# Patient Record
Sex: MALE | Race: BLACK OR AFRICAN AMERICAN | Employment: UNEMPLOYED | URBAN - METROPOLITAN AREA
[De-identification: names, ages, dates, MRNs, and addresses within clinical notes are randomized per-mention and may not be internally consistent; named-entity substitution may affect disease eponyms.]

---

## 2017-01-24 ENCOUNTER — APPOINTMENT (OUTPATIENT)
Dept: CT IMAGING | Age: 62
End: 2017-01-24
Attending: EMERGENCY MEDICINE
Payer: COMMERCIAL

## 2017-01-24 ENCOUNTER — HOSPITAL ENCOUNTER (EMERGENCY)
Age: 62
Discharge: HOME | End: 2017-01-25
Attending: EMERGENCY MEDICINE
Payer: COMMERCIAL

## 2017-01-24 ENCOUNTER — APPOINTMENT (OUTPATIENT)
Dept: GENERAL RADIOLOGY | Age: 62
End: 2017-01-24
Attending: EMERGENCY MEDICINE
Payer: COMMERCIAL

## 2017-01-24 DIAGNOSIS — J04.0 LARYNGITIS: ICD-10-CM

## 2017-01-24 DIAGNOSIS — E87.20 LACTIC ACIDOSIS: ICD-10-CM

## 2017-01-24 DIAGNOSIS — J20.9 ACUTE BRONCHITIS, UNSPECIFIED ORGANISM: Primary | ICD-10-CM

## 2017-01-24 DIAGNOSIS — E86.0 DEHYDRATION: ICD-10-CM

## 2017-01-24 DIAGNOSIS — F10.920 ALCOHOL INTOXICATION, UNCOMPLICATED (HCC): ICD-10-CM

## 2017-01-24 LAB
ALBUMIN SERPL BCP-MCNC: 3.9 G/DL (ref 3.5–5)
ALBUMIN/GLOB SERPL: 0.9 {RATIO} (ref 1.1–2.2)
ALP SERPL-CCNC: 63 U/L (ref 45–117)
ALT SERPL-CCNC: 25 U/L (ref 12–78)
ANION GAP BLD CALC-SCNC: 15 MMOL/L (ref 5–15)
APTT PPP: 30.3 SEC (ref 22.1–32.5)
AST SERPL W P-5'-P-CCNC: 36 U/L (ref 15–37)
BASOPHILS # BLD AUTO: 0 K/UL (ref 0–0.1)
BASOPHILS # BLD: 1 % (ref 0–1)
BILIRUB SERPL-MCNC: 1.5 MG/DL (ref 0.2–1)
BUN SERPL-MCNC: 10 MG/DL (ref 6–20)
BUN/CREAT SERPL: 13 (ref 12–20)
CALCIUM SERPL-MCNC: 8.2 MG/DL (ref 8.5–10.1)
CHLORIDE SERPL-SCNC: 103 MMOL/L (ref 97–108)
CK MB CFR SERPL CALC: 1.1 % (ref 0–2.5)
CK MB SERPL-MCNC: 3.7 NG/ML (ref 5–25)
CK SERPL-CCNC: 322 U/L (ref 39–308)
CO2 SERPL-SCNC: 24 MMOL/L (ref 21–32)
CREAT SERPL-MCNC: 0.76 MG/DL (ref 0.7–1.3)
DEPRECATED S PYO AG THROAT QL EIA: NEGATIVE
EOSINOPHIL # BLD: 0 K/UL (ref 0–0.4)
EOSINOPHIL NFR BLD: 1 % (ref 0–7)
ERYTHROCYTE [DISTWIDTH] IN BLOOD BY AUTOMATED COUNT: 12.1 % (ref 11.5–14.5)
ETHANOL SERPL-MCNC: 265 MG/DL
FLUAV AG NPH QL IA: NEGATIVE
FLUBV AG NOSE QL IA: NEGATIVE
GLOBULIN SER CALC-MCNC: 4.3 G/DL (ref 2–4)
GLUCOSE SERPL-MCNC: 84 MG/DL (ref 65–100)
HCT VFR BLD AUTO: 41.5 % (ref 36.6–50.3)
HGB BLD-MCNC: 13.6 G/DL (ref 12.1–17)
INR PPP: 1 (ref 0.9–1.1)
LACTATE SERPL-SCNC: 3.1 MMOL/L (ref 0.4–2)
LIPASE SERPL-CCNC: 172 U/L (ref 73–393)
LYMPHOCYTES # BLD AUTO: 39 % (ref 12–49)
LYMPHOCYTES # BLD: 1.5 K/UL (ref 0.8–3.5)
MAGNESIUM SERPL-MCNC: 2.2 MG/DL (ref 1.6–2.4)
MCH RBC QN AUTO: 32.7 PG (ref 26–34)
MCHC RBC AUTO-ENTMCNC: 32.8 G/DL (ref 30–36.5)
MCV RBC AUTO: 99.8 FL (ref 80–99)
MONOCYTES # BLD: 0.4 K/UL (ref 0–1)
MONOCYTES NFR BLD AUTO: 10 % (ref 5–13)
NEUTS SEG # BLD: 1.9 K/UL (ref 1.8–8)
NEUTS SEG NFR BLD AUTO: 49 % (ref 32–75)
PLATELET # BLD AUTO: 175 K/UL (ref 150–400)
POTASSIUM SERPL-SCNC: 3.8 MMOL/L (ref 3.5–5.1)
PROT SERPL-MCNC: 8.2 G/DL (ref 6.4–8.2)
PROTHROMBIN TIME: 9.9 SEC (ref 9–11.1)
RBC # BLD AUTO: 4.16 M/UL (ref 4.1–5.7)
SODIUM SERPL-SCNC: 142 MMOL/L (ref 136–145)
THERAPEUTIC RANGE,PTTT: NORMAL SECS (ref 58–77)
TROPONIN I SERPL-MCNC: <0.04 NG/ML
WBC # BLD AUTO: 3.8 K/UL (ref 4.1–11.1)

## 2017-01-24 PROCEDURE — 36415 COLL VENOUS BLD VENIPUNCTURE: CPT | Performed by: EMERGENCY MEDICINE

## 2017-01-24 PROCEDURE — 96374 THER/PROPH/DIAG INJ IV PUSH: CPT

## 2017-01-24 PROCEDURE — 99284 EMERGENCY DEPT VISIT MOD MDM: CPT

## 2017-01-24 PROCEDURE — 96361 HYDRATE IV INFUSION ADD-ON: CPT

## 2017-01-24 PROCEDURE — 87880 STREP A ASSAY W/OPTIC: CPT | Performed by: EMERGENCY MEDICINE

## 2017-01-24 PROCEDURE — 80053 COMPREHEN METABOLIC PANEL: CPT | Performed by: EMERGENCY MEDICINE

## 2017-01-24 PROCEDURE — 83690 ASSAY OF LIPASE: CPT | Performed by: EMERGENCY MEDICINE

## 2017-01-24 PROCEDURE — 84484 ASSAY OF TROPONIN QUANT: CPT | Performed by: EMERGENCY MEDICINE

## 2017-01-24 PROCEDURE — 85025 COMPLETE CBC W/AUTO DIFF WBC: CPT | Performed by: EMERGENCY MEDICINE

## 2017-01-24 PROCEDURE — 85730 THROMBOPLASTIN TIME PARTIAL: CPT | Performed by: EMERGENCY MEDICINE

## 2017-01-24 PROCEDURE — 85610 PROTHROMBIN TIME: CPT | Performed by: EMERGENCY MEDICINE

## 2017-01-24 PROCEDURE — 74011000250 HC RX REV CODE- 250: Performed by: EMERGENCY MEDICINE

## 2017-01-24 PROCEDURE — 83605 ASSAY OF LACTIC ACID: CPT | Performed by: EMERGENCY MEDICINE

## 2017-01-24 PROCEDURE — 87070 CULTURE OTHR SPECIMN AEROBIC: CPT | Performed by: EMERGENCY MEDICINE

## 2017-01-24 PROCEDURE — 87804 INFLUENZA ASSAY W/OPTIC: CPT | Performed by: EMERGENCY MEDICINE

## 2017-01-24 PROCEDURE — 70450 CT HEAD/BRAIN W/O DYE: CPT

## 2017-01-24 PROCEDURE — 71020 XR CHEST PA LAT: CPT

## 2017-01-24 PROCEDURE — 74011250636 HC RX REV CODE- 250/636: Performed by: EMERGENCY MEDICINE

## 2017-01-24 PROCEDURE — 87040 BLOOD CULTURE FOR BACTERIA: CPT | Performed by: EMERGENCY MEDICINE

## 2017-01-24 PROCEDURE — 83735 ASSAY OF MAGNESIUM: CPT | Performed by: EMERGENCY MEDICINE

## 2017-01-24 PROCEDURE — 80307 DRUG TEST PRSMV CHEM ANLYZR: CPT | Performed by: EMERGENCY MEDICINE

## 2017-01-24 PROCEDURE — 96375 TX/PRO/DX INJ NEW DRUG ADDON: CPT

## 2017-01-24 PROCEDURE — 82550 ASSAY OF CK (CPK): CPT | Performed by: EMERGENCY MEDICINE

## 2017-01-24 RX ORDER — MORPHINE SULFATE 2 MG/ML
4 INJECTION, SOLUTION INTRAMUSCULAR; INTRAVENOUS
Status: COMPLETED | OUTPATIENT
Start: 2017-01-24 | End: 2017-01-24

## 2017-01-24 RX ORDER — LIDOCAINE HYDROCHLORIDE 20 MG/ML
10 SOLUTION OROPHARYNGEAL
Status: COMPLETED | OUTPATIENT
Start: 2017-01-24 | End: 2017-01-24

## 2017-01-24 RX ORDER — ONDANSETRON 2 MG/ML
4 INJECTION INTRAMUSCULAR; INTRAVENOUS
Status: COMPLETED | OUTPATIENT
Start: 2017-01-24 | End: 2017-01-24

## 2017-01-24 RX ADMIN — SODIUM CHLORIDE 1000 ML: 900 INJECTION, SOLUTION INTRAVENOUS at 22:13

## 2017-01-24 RX ADMIN — ONDANSETRON 4 MG: 2 INJECTION INTRAMUSCULAR; INTRAVENOUS at 21:34

## 2017-01-24 RX ADMIN — LIDOCAINE HYDROCHLORIDE 10 ML: 20 SOLUTION ORAL; TOPICAL at 21:34

## 2017-01-24 RX ADMIN — SODIUM CHLORIDE 1000 ML: 900 INJECTION, SOLUTION INTRAVENOUS at 21:35

## 2017-01-24 RX ADMIN — Medication 4 MG: at 21:34

## 2017-01-24 NOTE — LETTER
NOTIFICATION OF RETURN TO WORK / SCHOOL 
 
1/25/2017 Mr. Rajeev Fischer [facility] 3 
[facility] [phone] To Whom It May Concern: 
 
Rajeev Fischer was seen in the ER on 1/24/2017. He will return to work on 1/30/2017 with no restrictions. Per Dr. Bryanna Soto Sincerely,

## 2017-01-24 NOTE — LETTER
[address] 
[facility] EMERGENCY DEPT 
[address] [place] [address] [address] [id number] 
[phone] Work/School Note Date: 1/24/2017 To Whom It May concern: 
 
Parth Mancia was seen and treated today in the emergency room by the following provider(s): 
Attending Provider: Namita Gar MD. Parth Mancia may return to work on 01/27/2017. Sincerely, Namita Gar MD

## 2017-01-25 VITALS
DIASTOLIC BLOOD PRESSURE: 98 MMHG | BODY MASS INDEX: 25.86 KG/M2 | OXYGEN SATURATION: 95 % | TEMPERATURE: 98.3 F | RESPIRATION RATE: 18 BRPM | WEIGHT: 174.6 LBS | SYSTOLIC BLOOD PRESSURE: 162 MMHG | HEART RATE: 78 BPM | HEIGHT: 69 IN

## 2017-01-25 LAB
APPEARANCE UR: ABNORMAL
BACTERIA URNS QL MICRO: NEGATIVE /HPF
BILIRUB UR QL CFM: NEGATIVE
COLOR UR: ABNORMAL
EPITH CASTS URNS QL MICRO: ABNORMAL /LPF
GLUCOSE UR STRIP.AUTO-MCNC: NEGATIVE MG/DL
HGB UR QL STRIP: ABNORMAL
HYALINE CASTS URNS QL MICRO: ABNORMAL /LPF (ref 0–5)
KETONES UR QL STRIP.AUTO: ABNORMAL MG/DL
LACTATE SERPL-SCNC: 2.2 MMOL/L (ref 0.4–2)
LEUKOCYTE ESTERASE UR QL STRIP.AUTO: NEGATIVE
MUCOUS THREADS URNS QL MICRO: ABNORMAL /LPF
NITRITE UR QL STRIP.AUTO: NEGATIVE
PH UR STRIP: 5.5 [PH] (ref 5–8)
PROT UR STRIP-MCNC: 30 MG/DL
RBC #/AREA URNS HPF: ABNORMAL /HPF (ref 0–5)
SP GR UR REFRACTOMETRY: 1.02 (ref 1–1.03)
UROBILINOGEN UR QL STRIP.AUTO: 1 EU/DL (ref 0.2–1)
WBC URNS QL MICRO: ABNORMAL /HPF (ref 0–4)

## 2017-01-25 PROCEDURE — 81001 URINALYSIS AUTO W/SCOPE: CPT | Performed by: EMERGENCY MEDICINE

## 2017-01-25 PROCEDURE — 83605 ASSAY OF LACTIC ACID: CPT | Performed by: EMERGENCY MEDICINE

## 2017-01-25 RX ORDER — AZITHROMYCIN 250 MG/1
TABLET, FILM COATED ORAL
Qty: 6 TAB | Refills: 0 | Status: SHIPPED | OUTPATIENT
Start: 2017-01-24 | End: 2017-01-29

## 2017-01-25 RX ORDER — LIDOCAINE HYDROCHLORIDE 20 MG/ML
15 SOLUTION OROPHARYNGEAL
Qty: 100 ML | Refills: 0 | Status: SHIPPED | OUTPATIENT
Start: 2017-01-24 | End: 2017-07-16

## 2017-01-25 RX ORDER — GUAIFENESIN 600 MG/1
600 TABLET, EXTENDED RELEASE ORAL
Qty: 10 TAB | Refills: 0 | Status: SHIPPED | OUTPATIENT
Start: 2017-01-24 | End: 2017-07-16

## 2017-01-25 NOTE — ED NOTES
Patient in room/bed as applicable, no acute distress noted. Siderails x2 call bell in reach bed in low position. Denies needs or questions at this time.

## 2017-01-25 NOTE — ED PROVIDER NOTES
HPI Comments: Marge Pickard is a 64 y.o. 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 Patient also complains of weakness in his bilateral legs for the past week and right arm starting last night. Patient reports his diarrhea has resolved, noting his last episode was a few days ago. Patient notes a family history of hypertension. Patient reports use of alcohol, but denies use of any tobacco products or illicit drugs. Patient denies taking any medications for his symptoms today. Patient denies getting a flu shot this season. Patient denies any facial droop, numbness or tingling, urinary symptoms, abdominal pain, neck pain, back pain, or any other symptoms at this time. PCP: Mady Lane MD    There are no other complaints, changes or physical findings at this time. The history is provided by the patient. Past Medical History:   Diagnosis Date    Alcoholism ([facility] [address]     Hypertension     Ill-defined condition      Hernia     Seizures ([facility] [address]      Withdrawal        No past surgical history on file. No family history on file. Social History     Social History    Marital status:      Spouse name: N/A    Number of children: N/A    Years of education: N/A     Occupational History    Not on file.      Social History Main Topics    Smoking status: Never Smoker    Smokeless tobacco: Not on file    Alcohol use 5.4 oz/week     9 Cans of beer per week      Comment: 1/2 pint of liquor daily : 9/23 now denies     Drug use: No    Sexual activity: Not on file     Other Topics Concern    Not on file     Social History Narrative     ALLERGIES: Lisinopril    Review of Systems   Constitutional: Positive for chills and fever (subjective). HENT: Positive for sore throat. Eyes: Negative. Respiratory: Positive for cough (nonproductive) and shortness of breath (mild). Cardiovascular: Positive for chest pain (minimal). Gastrointestinal: Positive for diarrhea (resolved). Negative for abdominal pain. Endocrine: Negative. Negative for heat intolerance. Genitourinary: Negative. Negative for difficulty urinating, frequency, hematuria and urgency. Musculoskeletal: Positive for myalgias (generalized). Negative for back pain and neck pain. Skin: Negative. Allergic/Immunologic: Negative. Negative for immunocompromised state. Neurological: Positive for weakness (bilateral legs, right arm) and headaches (intermittent). Negative for facial asymmetry and numbness. Hematological: Negative. Does not bruise/bleed easily. Psychiatric/Behavioral: Negative. All other systems reviewed and are negative. Patient Vitals for the past 12 hrs:   Temp Pulse Resp BP SpO2   01/25/17 0242 98.3 °F (36.8 °C) - - - 98 %   01/25/17 0040 - - - - 95 %   01/24/17 2300 98.3 °F (36.8 °C) 78 18 (!) 148/91 98 %   01/24/17 2201 - - - 132/90 93 %   01/24/17 1854 98.7 °F (37.1 °C) (!) 124 20 (!) 152/105 95 %      Physical Exam   Constitutional: He is oriented to person, place, and time. He appears well-developed and well-nourished. He appears distressed (moderate). HENT:   Head: Normocephalic and atraumatic. Eyes: EOM are normal.   Neck: Normal range of motion. Mild cervical tenderness   Cardiovascular: Regular rhythm and normal heart sounds. Tachycardia present. Pulmonary/Chest: Effort normal and breath sounds normal. He exhibits no tenderness. Bilateral lower lobe rhonchi   Abdominal: Soft. Bowel sounds are normal. There is no tenderness. Musculoskeletal: He exhibits no edema. Neurological: He is alert and oriented to person, place, and time.  Coordination normal. Equal   Sensory intact  Strength 5.5 in left leg, 5-/5 in right leg   Skin: Skin is warm and dry. Psychiatric: He has a normal mood and affect. Nursing note and vitals reviewed. MDM  Number of Diagnoses or Management Options  Acute bronchitis, unspecified organism:   Alcohol intoxication, uncomplicated ([address]):   Dehydration:   Laryngitis:   Diagnosis management comments: DDx: Influenza, dehydration, pneumonia, UTI, pharyngitis, electrolyte abnormality, TIA       Amount and/or Complexity of Data Reviewed  Clinical lab tests: ordered and reviewed  Tests in the radiology section of CPT®: ordered and reviewed  Review and summarize past medical records: yes    Critical Care  Total time providing critical care: 30-74 minutes    Patient Progress  Patient progress: stable      Procedures     Progress Note:  10:18 PM  Patient states his throat is feeling better. Progress Note:  11:27 PM  Spoke to the lab concerning apparent urine culture in process but lack of urinalysis results. Per lab personnel, no urine sample was received. Will check with nursing staff to make sure sample is sent to lab. Sign-Out Note:  11:51 PM  Patient's presentation, labs/imaging and plan of care was reviewed with Esther Kenyon MD as part of sign out. They will follow up with urinalysis and repeat lactate as part of the plan discussed with the patient. Esther Doe MD's assistance in completion of this plan is greatly appreciated but it should be noted that I will be the provider of record for this patient. This note is prepared by Kristi Olivera, acting as Scribe for Rashi Montelongo MD.    Rashi Montelongo MD: The scribe's documentation has been prepared under my direction and personally reviewed by me in its entirety. I confirm that the note above accurately reflects all work, treatment, procedures, and medical decision making performed by me. Progress Note:  3:26 AM  Lactic improved.  Will discharge per  Ronnie's plan. Written by Cindy Antoine, ISABEL Nicoleibjerod, as dictated by Esther Min MD.     CRITICAL CARE NOTE :    2:42 PM      IMPENDING DETERIORATION -Cardiovascular and Metabolic    ASSOCIATED RISK FACTORS - Dysrhythmia, Metabolic changes and Dehydration    MANAGEMENT- Bedside Assessment and Supervision of Care    INTERPRETATION -  Xrays, CT Scan, ECG and Blood Pressure    INTERVENTIONS - hemodynamic mngmt    CASE REVIEW - Nursing and Family    TREATMENT RESPONSE -Improved    PERFORMED BY - Self        NOTES   :      I have spent 40 minutes of critical care time involved in lab review, consultations with specialist, family decision- making, bedside attention and documentation. During this entire length of time I was immediately available to the patient . Tom Torres MD                                                    LABORATORY TESTS:  Recent Results (from the past 12 hour(s))   INFLUENZA A & B AG (RAPID TEST)    Collection Time: 01/24/17  8:15 PM   Result Value Ref Range    Influenza A Antigen NEGATIVE  NEG      Influenza B Antigen NEGATIVE  NEG     CBC WITH AUTOMATED DIFF    Collection Time: 01/24/17  8:37 PM   Result Value Ref Range    WBC 3.8 (L) 4.1 - 11.1 K/uL    RBC 4.16 4.10 - 5.70 M/uL    HGB 13.6 12.1 - 17.0 g/dL    HCT 41.5 36.6 - 50.3 %    MCV 99.8 (H) 80.0 - 99.0 FL    MCH 32.7 26.0 - 34.0 PG    MCHC 32.8 30.0 - 36.5 g/dL    RDW 12.1 11.5 - 14.5 %    PLATELET 769 623 - 387 K/uL    NEUTROPHILS 49 32 - 75 %    LYMPHOCYTES 39 12 - 49 %    MONOCYTES 10 5 - 13 %    EOSINOPHILS 1 0 - 7 %    BASOPHILS 1 0 - 1 %    ABS. NEUTROPHILS 1.9 1.8 - 8.0 K/UL    ABS. LYMPHOCYTES 1.5 0.8 - 3.5 K/UL    ABS. MONOCYTES 0.4 0.0 - 1.0 K/UL    ABS. EOSINOPHILS 0.0 0.0 - 0.4 K/UL    ABS.  BASOPHILS 0.0 0.0 - 0.1 K/UL   PROTHROMBIN TIME + INR    Collection Time: 01/24/17  8:37 PM   Result Value Ref Range    INR 1.0 0.9 - 1.1      Prothrombin time 9.9 9.0 - 11.1 sec   PTT    Collection Time: 01/24/17  8:37 PM   Result Value Ref Range    aPTT 30.3 22.1 - 32.5 sec    aPTT, therapeutic range     58.0 - 79.4 SECS   METABOLIC PANEL, COMPREHENSIVE    Collection Time: 01/24/17  8:37 PM   Result Value Ref Range    Sodium 142 136 - 145 mmol/L    Potassium 3.8 3.5 - 5.1 mmol/L    Chloride 103 97 - 108 mmol/L    CO2 24 21 - 32 mmol/L    Anion gap 15 5 - 15 mmol/L    Glucose 84 65 - 100 mg/dL    BUN 10 6 - 20 MG/DL    Creatinine 0.76 0.70 - 1.30 MG/DL    BUN/Creatinine ratio 13 12 - 20      GFR est AA >60 >60 ml/min/1.73m2    GFR est non-AA >60 >60 ml/min/1.73m2    Calcium 8.2 (L) 8.5 - 10.1 MG/DL    Bilirubin, total 1.5 (H) 0.2 - 1.0 MG/DL    ALT 25 12 - 78 U/L    AST 36 15 - 37 U/L    Alk.  phosphatase 63 45 - 117 U/L    Protein, total 8.2 6.4 - 8.2 g/dL    Albumin 3.9 3.5 - 5.0 g/dL    Globulin 4.3 (H) 2.0 - 4.0 g/dL    A-G Ratio 0.9 (L) 1.1 - 2.2     LIPASE    Collection Time: 01/24/17  8:37 PM   Result Value Ref Range    Lipase 172 73 - 393 U/L   MAGNESIUM    Collection Time: 01/24/17  8:37 PM   Result Value Ref Range    Magnesium 2.2 1.6 - 2.4 mg/dL   STREP AG SCREEN, GROUP A    Collection Time: 01/24/17  8:37 PM   Result Value Ref Range    Group A Strep Ag ID NEGATIVE  NEG     CK W/ CKMB & INDEX    Collection Time: 01/24/17  8:37 PM   Result Value Ref Range     (H) 39 - 308 U/L    CK - MB 3.7 (H) <3.6 NG/ML    CK-MB Index 1.1 0 - 2.5     TROPONIN I    Collection Time: 01/24/17  8:37 PM   Result Value Ref Range    Troponin-I, Qt. <0.04 <0.05 ng/mL   ETHYL ALCOHOL    Collection Time: 01/24/17  8:37 PM   Result Value Ref Range    ALCOHOL(ETHYL),SERUM 265 (H) <10 MG/DL   LACTIC ACID, PLASMA    Collection Time: 01/24/17  9:26 PM   Result Value Ref Range    Lactic acid 3.1 (HH) 0.4 - 2.0 MMOL/L   URINALYSIS W/ RFLX MICROSCOPIC    Collection Time: 01/25/17 12:00 AM   Result Value Ref Range    Color DARK YELLOW      Appearance CLOUDY (A) CLEAR      Specific gravity 1.023 1.003 - 1.030      pH (UA) 5.5 5.0 - 8.0      Protein 30 (A) NEG mg/dL    Glucose NEGATIVE  NEG mg/dL    Ketone TRACE (A) NEG mg/dL    Blood TRACE (A) NEG      Urobilinogen 1.0 0.2 - 1.0 EU/dL    Nitrites NEGATIVE  NEG      Leukocyte Esterase NEGATIVE  NEG      WBC 0-4 0 - 4 /hpf    RBC 5-10 0 - 5 /hpf    Epithelial cells FEW FEW /lpf    Bacteria NEGATIVE  NEG /hpf    Mucus 1+ (A) NEG /lpf    Hyaline Cast 2-5 0 - 5 /lpf   BILIRUBIN, CONFIRM    Collection Time: 01/25/17 12:00 AM   Result Value Ref Range    Bilirubin UA, confirm NEGATIVE  NEG     LACTIC ACID, PLASMA    Collection Time: 01/25/17  2:03 AM   Result Value Ref Range    Lactic acid 2.2 (HH) 0.4 - 2.0 MMOL/L       IMAGING RESULTS:  CT HEAD WO CONT   Final Result   EXAM: CT HEAD WO CONT     INDICATION: Hoarseness and nonproductive cough for 5 days. Bilateral lower  extremity and right upper terminate weakness.     COMPARISON: CT head on 11/3/2016     TECHNIQUE: Noncontrast head CT. Coronal and sagittal reformats. CT dose  reduction was achieved through the use of a standardized protocol tailored for  this examination and automatic exposure control for dose modulation.     FINDINGS: The ventricles and sulci are age-appropriate without hydrocephalus. There is no mass effect or midline shift. There is no intracranial hemorrhage or  extra-axial fluid collection.      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 No CT evidence of acute infarct.     The calvarium is intact. The visualized paranasal sinuses and mastoid air cells  are clear.     IMPRESSION  IMPRESSION:      No acute intracranial abnormality on this noncontrast head CT. No change since  2.5 months ago. XR CHEST PA LAT   Final Result   EXAM: XR CHEST PA LAT     INDICATION: Horse 4 days, nonproductive cough for 5 days.  Associated fatigue.     COMPARISON: Chest views on 12/3/2015     TECHNIQUE: PA and lateral chest views     FINDINGS: The cardiomediastinal and hilar contours are within normal limits. The  pulmonary vasculature is within normal limits.      The lungs and pleural spaces are clear. Thoracic spine DISH is better imaged.     IMPRESSION  IMPRESSION:     No pneumonia or pneumothorax. No change given difference in technique. MEDICATIONS GIVEN:  Medications   sodium chloride 0.9 % bolus infusion 1,000 mL (0 mL IntraVENous IV Completed 1/24/17 2327)   lidocaine (XYLOCAINE) 2 % viscous solution 10 mL (10 mL Mouth/Throat Given 1/24/17 2134)   morphine injection 4 mg (4 mg IntraVENous Given 1/24/17 2134)   ondansetron (ZOFRAN) injection 4 mg (4 mg IntraVENous Given 1/24/17 2134)   sodium chloride 0.9 % bolus infusion 1,000 mL (1,000 mL IntraVENous New Bag 1/24/17 2213)       IMPRESSION:  1. Acute bronchitis, unspecified organism    2. Dehydration    3. Alcohol intoxication, uncomplicated ([address]    4. Laryngitis        PLAN:  1. Current Discharge Medication List      START taking these medications    Details   lidocaine (LIDOCAINE VISCOUS) 2 % solution Take 15 mL by mouth three (3) times daily as needed for Pain. Qty: 100 mL, Refills: 0      guaiFENesin ER (MUCINEX) 600 mg ER tablet Take 1 Tab by mouth two (2) times daily as needed for Congestion. Qty: 10 Tab, Refills: 0      azithromycin (ZITHROMAX Z-ELA) 250 mg tablet As directed  Qty: 6 Tab, Refills: 0         CONTINUE these medications which have NOT CHANGED    Details   chlordiazePOXIDE (LIBRIUM) 5 mg capsule Take 5 mg by mouth two (2) times daily as needed for Anxiety. atenolol (TENORMIN) 25 mg tablet Take 25 mg by mouth daily. OTHER Indications: pt. taking blood pressure medication but doesn't know the name of it           2.    Follow-up Information     Follow up With Details Comments Contact Jabier Bailey MD In 2 days  [address]  [phone]      \[facility] [place] [facility]\"" EMERGENCY DEPT  If symptoms worsen [address]  [address]   [address] [address] [facility]  3. Return to ED if worse     Discharge Note:  3:27 AM  The patient has been re-evaluated and is ready for discharge. Reviewed available results with patient. Counseled patient on diagnosis and care plan. Patient has expressed understanding, and all questions have been answered. Patient agrees with plan and agrees to follow up as recommended, or to return to the ED if their symptoms worsen. Discharge instructions have been provided and explained to the patient, along with reasons to return to the ED. This note is prepared by Felix Santillan, acting as Scribe for Esther Sher MD.     Kishore Arroyo MD: The scribe's documentation has been prepared under my direction and personally reviewed by me in its entirety. I confirm that the note above accurately reflects all work, treatment, procedures, and medical decision making performed by me.

## 2017-01-25 NOTE — ED NOTES
Per Dr. Harrell Adjutant, allow fluids (2nd liter) to infuse with pressure bag and then redraw lactic. Pt is tolerating PO fluids without complaint. He reports thirst.  Update of plan of care provided to patient and family member at bedside. Dr. Cristobal Akins aware that IV is in hand, requiring pressure bag for increased rate of flow.

## 2017-01-25 NOTE — ED NOTES
Inquired with lab [facility] via [id number] if results for prn lactic are almost complete. At this time nursing staff is informed the sample is hemolyzed and they need a redraw.

## 2017-01-25 NOTE — ED NOTES
Patient resting in bed, bed locked in low position, call bell within reach. He denies questions or needs at this time and verbalizes need for collected urine sample. Fluids to pressure bag for rehydration. No acute distress is noted.

## 2017-01-25 NOTE — ED NOTES
Patient discharged by LP. No acute distress noted. IV removed catheter fully intact. Wheelchair at AR. Wife present at AR.

## 2017-01-25 NOTE — DISCHARGE INSTRUCTIONS
Acute Alcohol Intoxication: Care Instructions  Your Care Instructions  You have had treatment to help your body rid itself of alcohol. Too much alcohol upsets the body's fluid balance. Your doctor may have given you fluids and vitamins. For some people, drinking too much alcohol is a one-time event. For others, it is an ongoing problem. In either case, it is serious. It can be life-threatening. Follow-up care is a key part of your treatment and safety. Be sure to make and go to all appointments, and call your doctor if you are having problems. It's also a good idea to know your test results and keep a list of the medicines you take. How can you care for yourself at home? · Be safe with medicines. Take your medicines exactly as prescribed. Call your doctor if you think you are having a problem with your medicine. · Your doctor may have prescribed disulfiram (Antabuse). Do not drink any alcohol while you are taking this medicine. You may have severe or even life-threatening side effects from even small amounts of alcohol. · If you were given medicine to prevent nausea, be sure to take it exactly as prescribed. · Before you take any medicine, tell your doctor if:  ¨ You have had a bad reaction to any medicines in the past.  ¨ You are taking other medicines, including over-the-counter ones, or have other health problems. ¨ You are or could be pregnant. · Be prepared to have some symptoms of withdrawal in the next few days. · Drink plenty of liquids in the next few days. · Seek help if you need it to stop drinking. Getting counseling and joining a support group can help you stay sober. Try a support group such as Alcoholics Anonymous. · Avoid alcohol when you take medicines. It can react with many medicines and cause serious problems. When should you call for help? Call 911 anytime you think you may need emergency care.  For example, call if:  · You feel confused and are seeing things that are not there.  · You are thinking about killing yourself or hurting others. · You have a seizure. · You vomit blood or what looks like coffee grounds. Call your doctor now or seek immediate medical care if:  · You have trembling, restlessness, sweating, and other withdrawal symptoms that are new or that get worse. · Your withdrawal symptoms come back after not bothering you for days or weeks. · You can't stop vomiting. Watch closely for changes in your health, and be sure to contact your doctor if:  · You need help to stop drinking. Where can you learn more? Go to http://juju-yamileth.info/. Enter T102 in the search box to learn more about \"Acute Alcohol Intoxication: Care Instructions. \"  Current as of: May 27, 2016  Content Version: 11.1  © 5013-4693 [employer]. Care instructions adapted under license by [employer] (which disclaims liability or warranty for this information). If you have questions about a medical condition or this instruction, always ask your healthcare professional. [place] [address] any warranty or liability for your use of this information. Bronchitis: Care Instructions  Your Care Instructions    Bronchitis is inflammation of the bronchial tubes, which carry air to the lungs. The tubes swell and produce mucus, or phlegm. The mucus and inflamed bronchial tubes make you cough. You may have trouble breathing. Most cases of bronchitis are caused by viruses like those that cause colds. Antibiotics usually do not help and they may be harmful. Bronchitis usually develops rapidly and lasts about 2 to 3 weeks in otherwise healthy people. Follow-up care is a key part of your treatment and safety. Be sure to make and go to all appointments, and call your doctor if you are having problems. It's also a good idea to know your test results and keep a list of the medicines you take. How can you care for yourself at home?   · Take all medicines exactly as prescribed. Call your doctor if you think you are having a problem with your medicine. · Get some extra rest.  · Take an over-the-counter pain medicine, such as acetaminophen (Tylenol), ibuprofen (Advil, Motrin), or naproxen (Aleve) to reduce fever and relieve body aches. Read and follow all instructions on the label. · Do not take two or more pain medicines at the same time unless the doctor told you to. Many pain medicines have acetaminophen, which is Tylenol. Too much acetaminophen (Tylenol) can be harmful. · Take an over-the-counter cough medicine that contains dextromethorphan to help quiet a dry, hacking cough so that you can sleep. Avoid cough medicines that have more than one active ingredient. Read and follow all instructions on the label. · Breathe moist air from a humidifier, hot shower, or sink filled with hot water. The heat and moisture will thin mucus so you can cough it out. · Do not smoke. Smoking can make bronchitis worse. If you need help quitting, talk to your doctor about stop-smoking programs and medicines. These can increase your chances of quitting for good. When should you call for help? Call 911 anytime you think you may need emergency care. For example, call if:  · You have severe trouble breathing. Call your doctor now or seek immediate medical care if:  · You have new or worse trouble breathing. · You cough up dark brown or bloody mucus (sputum). · You have a new or higher fever. · You have a new rash. Watch closely for changes in your health, and be sure to contact your doctor if:  · You cough more deeply or more often, especially if you notice more mucus or a change in the color of your mucus. · You are not getting better as expected. Where can you learn more? Go to http://juju-yamileth.info/. Enter H333 in the search box to learn more about \"Bronchitis: Care Instructions. \"  Current as of: May 23, 2016  Content Version: 11.1  © 9034-0347 [employer]. Care instructions adapted under license by [employer] (which disclaims liability or warranty for this information). If you have questions about a medical condition or this instruction, always ask your healthcare professional. [address] any warranty or liability for your use of this information. Dehydration: Care Instructions  Your Care Instructions  Dehydration happens when your body loses too much fluid. This might happen when you do not drink enough water or you lose large amounts of fluids from your body because of diarrhea, vomiting, or sweating. Severe dehydration can be life-threatening. Water and minerals called electrolytes help put your body fluids back in balance. Learn the early signs of fluid loss, and drink more fluids to prevent dehydration. Follow-up care is a key part of your treatment and safety. Be sure to make and go to all appointments, and call your doctor if you are having problems. It's also a good idea to know your test results and keep a list of the medicines you take. How can you care for yourself at home? · To prevent dehydration, drink plenty of fluids, enough so that your urine is light yellow or clear like water. Choose water and other caffeine-free clear liquids until you feel better. If you have kidney, heart, or liver disease and have to limit fluids, talk with your doctor before you increase the amount of fluids you drink. · If you do not feel like eating or drinking, try taking small sips of water, sports drinks, or other rehydration drinks. · Get plenty of rest.  To prevent dehydration  · Add more fluids to your diet and daily routine, unless your doctor has told you not to. · During hot weather, drink more fluids. Drink even more fluids if you exercise a lot. Stay away from drinks with alcohol or caffeine. · Watch for the symptoms of dehydration. These include:  ¨ A dry, sticky mouth.   ¨ Dark yellow urine, and not much of it. ¨ Dry and sunken eyes. ¨ Feeling very tired. · Learn what problems can lead to dehydration. These include:  ¨ Diarrhea, fever, and vomiting. ¨ Any illness with a fever, such as pneumonia or the flu. ¨ Activities that cause heavy sweating, such as endurance races and heavy outdoor work in hot or humid weather. ¨ Alcohol or drug abuse or withdrawal.  ¨ Certain medicines, such as cold and allergy pills (antihistamines), diet pills (diuretics), and laxatives. ¨ Certain diseases, such as diabetes, cancer, and heart or kidney disease. When should you call for help? Call 911 anytime you think you may need emergency care. For example, call if:  · You passed out (lost consciousness). Call your doctor now or seek immediate medical care if:  · You are confused and cannot think clearly. · You are dizzy or lightheaded, or you feel like you may faint. · You have signs of needing more fluids. You have sunken eyes and a dry mouth, and you pass only a little dark urine. · You cannot keep fluids down. Watch closely for changes in your health, and be sure to contact your doctor if:  · You are not making tears. · Your skin is very dry and sags slowly back into place after you pinch it. · Your mouth and eyes are very dry. Where can you learn more? Go to http://juju-yamileth.info/. Enter R628 in the search box to learn more about \"Dehydration: Care Instructions. \"  Current as of: May 27, 2016  Content Version: 11.1  © 1073-9213 [employer]. Care instructions adapted under license by [employer] (which disclaims liability or warranty for this information). If you have questions about a medical condition or this instruction, always ask your healthcare professional. [place] [address] any warranty or liability for your use of this information.          Laryngitis: Care Instructions  Your Care Instructions    Laryngitis is an inflammation of the voice box (larynx) that causes your voice to become raspy or hoarse. It can be short-lived or long-lasting. Most of the time, laryngitis comes on quickly and lasts as long as 2 weeks. It is caused by overuse, irritation, or infection of the vocal cords inside the larynx. Some of the most common causes are a cold, the flu, or allergies. Loud talking, shouting, cheering, or singing also can cause laryngitis. Stomach acid that backs up into the throat also can make you lose your voice. Resting your voice and taking other steps at home can help you get your voice back. Follow-up care is a key part of your treatment and safety. Be sure to make and go to all appointments, and call your doctor if you are having problems. It's also a good idea to know your test results and keep a list of the medicines you take. How can you care for yourself at home? · Follow your doctor's directions for treating the condition that caused you to lose your voice. If your doctor prescribed antibiotics, take them as directed. Do not stop taking them just because you feel better. You need to take the full course of antibiotics. · Before you use cough and cold medicines, check the label. They may not be safe for young children or for people with certain health problems. · Try to keep stomach acid from backing up into your throat. Do not eat just before bedtime. Reduce the amount of coffee and alcohol you drink, and eat healthy foods. Taking over-the-counter acid reducers can help when these steps are not enough. In some cases, you may need prescription medicine. · Rest your voice. You do not have to stop speaking, but use your voice as little as possible. Speak softly but do not whisper; whispering can bother your larynx more than speaking softly. Avoid talking on the telephone or trying to speak loudly. · Try not to clear your throat. This can cause more irritation of your larynx.  Take an over-the-counter cough suppressant (if your doctor recommends it) if you have a dry cough that does not produce mucus. · Do not smoke or allow others to smoke around you. If you need help quitting, talk to your doctor about stop-smoking programs and medicines. These can increase your chances of quitting for good. · Use a humidifier or vaporizer to add moisture to your bedroom. Humidity helps to thin the mucus in the nasal membranes that causes stuffiness or postnasal drip. Follow the directions for cleaning the machine. · Drink plenty of fluids, enough so that your urine is light yellow or clear like water. If you have kidney, heart, or liver disease and have to limit fluids, talk with your doctor before you increase the amount of fluids you drink. · Relieve nasal stuffiness. A saline (saltwater) nasal wash may help. You can buy saline nose drops at a grocery store or drugstore. Or you can make your own by adding 1 teaspoon of salt and 1 teaspoon of baking soda to 2 cups of distilled water. If you make your own, fill a bulb syringe with the solution, insert the tip into your nostril, and squeeze gently. Jimena Mosley your nose. When should you call for help? Call your doctor now or seek immediate medical care if:  · You have new or worse trouble breathing. · You have new pain, or your pain gets worse. · You have trouble swallowing. Watch closely for changes in your health, and be sure to contact your doctor if:  · Your voice does not get better after 2 weeks of care at home. Where can you learn more? Go to http://juju-yamileth.info/. Enter D335 in the search box to learn more about \"Laryngitis: Care Instructions. \"  Current as of: July 29, 2016  Content Version: 11.1  © 4446-4258 [employer]. Care instructions adapted under license by [employer] (which disclaims liability or warranty for this information).  If you have questions about a medical condition or this instruction, always ask your healthcare professional. [address] any warranty or liability for your use of this information.

## 2017-01-27 LAB
BACTERIA SPEC CULT: NORMAL
SERVICE CMNT-IMP: NORMAL

## 2017-01-29 LAB
BACTERIA SPEC CULT: NORMAL
SERVICE CMNT-IMP: NORMAL

## 2017-02-01 ENCOUNTER — HOSPITAL ENCOUNTER (EMERGENCY)
Age: 62
Discharge: HOME | End: 2017-02-02
Attending: EMERGENCY MEDICINE | Admitting: EMERGENCY MEDICINE
Payer: COMMERCIAL

## 2017-02-01 VITALS
TEMPERATURE: 98.6 F | WEIGHT: 173.72 LBS | BODY MASS INDEX: 25.73 KG/M2 | SYSTOLIC BLOOD PRESSURE: 166 MMHG | OXYGEN SATURATION: 97 % | HEIGHT: 69 IN | RESPIRATION RATE: 23 BRPM | DIASTOLIC BLOOD PRESSURE: 90 MMHG | HEART RATE: 96 BPM

## 2017-02-01 DIAGNOSIS — F10.239 ALCOHOL DEPENDENCE WITH WITHDRAWAL WITH COMPLICATION (HCC): Primary | ICD-10-CM

## 2017-02-01 LAB
ALBUMIN SERPL BCP-MCNC: 4.1 G/DL (ref 3.5–5)
ALBUMIN/GLOB SERPL: 1.1 {RATIO} (ref 1.1–2.2)
ALP SERPL-CCNC: 68 U/L (ref 45–117)
ALT SERPL-CCNC: 39 U/L (ref 12–78)
ANION GAP BLD CALC-SCNC: 12 MMOL/L (ref 5–15)
APPEARANCE UR: CLEAR
AST SERPL W P-5'-P-CCNC: 61 U/L (ref 15–37)
BACTERIA URNS QL MICRO: NEGATIVE /HPF
BASOPHILS # BLD AUTO: 0 K/UL (ref 0–0.1)
BASOPHILS # BLD: 1 % (ref 0–1)
BILIRUB SERPL-MCNC: 1.5 MG/DL (ref 0.2–1)
BILIRUB UR QL: NEGATIVE
BUN SERPL-MCNC: 4 MG/DL (ref 6–20)
BUN/CREAT SERPL: 5 (ref 12–20)
CALCIUM SERPL-MCNC: 8.9 MG/DL (ref 8.5–10.1)
CHLORIDE SERPL-SCNC: 104 MMOL/L (ref 97–108)
CO2 SERPL-SCNC: 26 MMOL/L (ref 21–32)
COLOR UR: ABNORMAL
CREAT SERPL-MCNC: 0.81 MG/DL (ref 0.7–1.3)
DIFFERENTIAL METHOD BLD: ABNORMAL
EOSINOPHIL # BLD: 0.1 K/UL (ref 0–0.4)
EOSINOPHIL NFR BLD: 2 % (ref 0–7)
EPITH CASTS URNS QL MICRO: ABNORMAL /LPF
ERYTHROCYTE [DISTWIDTH] IN BLOOD BY AUTOMATED COUNT: 12.6 % (ref 11.5–14.5)
ETHANOL SERPL-MCNC: 244 MG/DL
GLOBULIN SER CALC-MCNC: 3.9 G/DL (ref 2–4)
GLUCOSE SERPL-MCNC: 122 MG/DL (ref 65–100)
GLUCOSE UR STRIP.AUTO-MCNC: NEGATIVE MG/DL
HCT VFR BLD AUTO: 41.7 % (ref 36.6–50.3)
HGB BLD-MCNC: 13.7 G/DL (ref 12.1–17)
HGB UR QL STRIP: ABNORMAL
HYALINE CASTS URNS QL MICRO: ABNORMAL /LPF (ref 0–5)
KETONES UR QL STRIP.AUTO: NEGATIVE MG/DL
LEUKOCYTE ESTERASE UR QL STRIP.AUTO: ABNORMAL
LYMPHOCYTES # BLD AUTO: 50 % (ref 12–49)
LYMPHOCYTES # BLD: 1.5 K/UL (ref 0.8–3.5)
MCH RBC QN AUTO: 33.2 PG (ref 26–34)
MCHC RBC AUTO-ENTMCNC: 32.9 G/DL (ref 30–36.5)
MCV RBC AUTO: 101 FL (ref 80–99)
MONOCYTES # BLD: 0.4 K/UL (ref 0–1)
MONOCYTES NFR BLD AUTO: 14 % (ref 5–13)
NEUTS SEG # BLD: 1 K/UL (ref 1.8–8)
NEUTS SEG NFR BLD AUTO: 33 % (ref 32–75)
NITRITE UR QL STRIP.AUTO: NEGATIVE
PH UR STRIP: 6 [PH] (ref 5–8)
PLATELET # BLD AUTO: 237 K/UL (ref 150–400)
POTASSIUM SERPL-SCNC: 3.5 MMOL/L (ref 3.5–5.1)
PROT SERPL-MCNC: 8 G/DL (ref 6.4–8.2)
PROT UR STRIP-MCNC: ABNORMAL MG/DL
RBC # BLD AUTO: 4.13 M/UL (ref 4.1–5.7)
RBC #/AREA URNS HPF: ABNORMAL /HPF (ref 0–5)
RBC MORPH BLD: ABNORMAL
SODIUM SERPL-SCNC: 142 MMOL/L (ref 136–145)
SP GR UR REFRACTOMETRY: 1.01 (ref 1–1.03)
UA: UC IF INDICATED,UAUC: ABNORMAL
UROBILINOGEN UR QL STRIP.AUTO: 1 EU/DL (ref 0.2–1)
WBC # BLD AUTO: 3 K/UL (ref 4.1–11.1)
WBC URNS QL MICRO: ABNORMAL /HPF (ref 0–4)

## 2017-02-01 PROCEDURE — 80053 COMPREHEN METABOLIC PANEL: CPT | Performed by: EMERGENCY MEDICINE

## 2017-02-01 PROCEDURE — 80307 DRUG TEST PRSMV CHEM ANLYZR: CPT | Performed by: EMERGENCY MEDICINE

## 2017-02-01 PROCEDURE — 85025 COMPLETE CBC W/AUTO DIFF WBC: CPT | Performed by: EMERGENCY MEDICINE

## 2017-02-01 PROCEDURE — 96366 THER/PROPH/DIAG IV INF ADDON: CPT

## 2017-02-01 PROCEDURE — 96365 THER/PROPH/DIAG IV INF INIT: CPT

## 2017-02-01 PROCEDURE — 36415 COLL VENOUS BLD VENIPUNCTURE: CPT | Performed by: EMERGENCY MEDICINE

## 2017-02-01 PROCEDURE — 74011000250 HC RX REV CODE- 250: Performed by: EMERGENCY MEDICINE

## 2017-02-01 PROCEDURE — 74011250636 HC RX REV CODE- 250/636: Performed by: EMERGENCY MEDICINE

## 2017-02-01 PROCEDURE — 99285 EMERGENCY DEPT VISIT HI MDM: CPT

## 2017-02-01 PROCEDURE — 81001 URINALYSIS AUTO W/SCOPE: CPT | Performed by: EMERGENCY MEDICINE

## 2017-02-01 PROCEDURE — 96375 TX/PRO/DX INJ NEW DRUG ADDON: CPT

## 2017-02-01 PROCEDURE — 93005 ELECTROCARDIOGRAM TRACING: CPT

## 2017-02-01 RX ORDER — LORAZEPAM 2 MG/ML
2 INJECTION INTRAMUSCULAR
Status: COMPLETED | OUTPATIENT
Start: 2017-02-01 | End: 2017-02-01

## 2017-02-01 RX ORDER — ATENOLOL 25 MG/1
25 TABLET ORAL
Status: COMPLETED | OUTPATIENT
Start: 2017-02-01 | End: 2017-02-02

## 2017-02-01 RX ORDER — CHLORDIAZEPOXIDE HYDROCHLORIDE 5 MG/1
10 CAPSULE, GELATIN COATED ORAL
Qty: 40 CAP | Refills: 0 | Status: SHIPPED | OUTPATIENT
Start: 2017-02-01 | End: 2017-02-06

## 2017-02-01 RX ADMIN — FOLIC ACID: 5 INJECTION, SOLUTION INTRAMUSCULAR; INTRAVENOUS; SUBCUTANEOUS at 19:47

## 2017-02-01 RX ADMIN — LORAZEPAM 2 MG: 2 INJECTION INTRAMUSCULAR; INTRAVENOUS at 18:59

## 2017-02-01 NOTE — ED PROVIDER NOTES
HPI Comments: 63 yo BM with h/o alcohol abuse,HTN,alcohol withdrawal seizure, poor medication compliance, presents concerned he is going through withdrawal/ He did drink earlier in am. He also states he has not been taking his blood pressure. He was put on seizure meds and is not taking that as well. He denies hallucinations or homicidal or suicidal thoughts. Patient is a 64 y.o. male presenting with alcohol problem. The history is provided by the patient. No  was used. Alcohol Problem   Primary symptoms include: agitation. There areno confusion, no seizures, no weakness, no delusions and no hallucinations present at this time. This is a recurrent problem. The current episode started 12 to 24 hours ago. The problem has been gradually worsening. Suspected agents include alcohol. Pertinent negatives include no fever, no nausea and no vomiting. Associated medical issues include withdrawal syndrome. Associated medical issues do not include suicidal ideas. Past Medical History:   Diagnosis Date    Alcoholism ([facility] [address]     Hypertension     Ill-defined condition      Hernia     Seizures ([facility] [address]      Withdrawal        No past surgical history on file. No family history on file. Social History     Social History    Marital status:      Spouse name: N/A    Number of children: N/A    Years of education: N/A     Occupational History    Not on file. Social History Main Topics    Smoking status: Never Smoker    Smokeless tobacco: Not on file    Alcohol use 5.4 oz/week     9 Cans of beer per week      Comment: 1/2 pint of liquor daily : 9/23 now denies     Drug use: No    Sexual activity: Not on file     Other Topics Concern    Not on file     Social History Narrative         ALLERGIES: Lisinopril    Review of Systems   Constitutional: Negative for chills and fever. HENT: Negative. Negative for sore throat. Eyes: Negative for pain.    Respiratory: Negative for cough and shortness of breath. Cardiovascular: Negative for chest pain. Gastrointestinal: Negative for abdominal distention, abdominal pain, nausea and vomiting. Endocrine: Negative for cold intolerance. Genitourinary: Negative for difficulty urinating and dysuria. Musculoskeletal: Negative for arthralgias. Skin: Negative. Negative for pallor. Neurological: Positive for tremors. Negative for seizures, weakness and headaches. Psychiatric/Behavioral: Positive for agitation. Negative for confusion, hallucinations and suicidal ideas. The patient is nervous/anxious. All other systems reviewed and are negative. Vitals:    02/01/17 1615 02/01/17 1728   BP: (!) 150/120 (!) 139/102   Pulse: (!) 125 (!) 114   Resp: 18 18   Temp: 98.6 °F (37 °C)    SpO2: 95% 97%   Weight: 78.8 kg (173 lb 11.6 oz)    Height: 5' 9\" (1.753 m)             Physical Exam   Constitutional: He is oriented to person, place, and time. He appears well-developed and well-nourished. No distress. HENT:   Head: Normocephalic and atraumatic. Head is without raccoon's eyes, without Mariscal's sign and without contusion. Eyes: Conjunctivae and EOM are normal. Pupils are equal, round, and reactive to light. Neck: Normal range of motion. Neck supple. Carotid bruit is not present. No tracheal deviation present. No Brudzinski's sign and no Kernig's sign noted. No thyromegaly present. Cardiovascular: Normal rate, regular rhythm, normal heart sounds and intact distal pulses. Exam reveals no gallop and no friction rub. No murmur heard. Pulmonary/Chest: Effort normal. No respiratory distress. He has no wheezes. He has no rales. Abdominal: Soft. Bowel sounds are normal. He exhibits no distension and no mass. There is no tenderness. There is no rebound and no guarding. Musculoskeletal: Normal range of motion. He exhibits no edema or tenderness. Neurological: He is alert and oriented to person, place, and time. He displays tremor.  He displays no atrophy and normal reflexes. No cranial nerve deficit or sensory deficit. He exhibits normal muscle tone. He displays no seizure activity. Coordination normal. GCS eye subscore is 4. GCS verbal subscore is 5. GCS motor subscore is 6. Intention tremor noted   Skin: Skin is dry. He is not diaphoretic. No pallor. Psychiatric: His behavior is normal. Thought content normal.   Nursing note and vitals reviewed. MDM  Number of Diagnoses or Management Options  Alcohol dependence with withdrawal with complication [facility]):   Diagnosis management comments: Pt presenting with alcohol withdrawal, last drink earlier today. He will be given ativan and we will obtain basic labs and continue to follow his CIWA scores. Pt will require admission for further management    Patient responded to medications in the Er. Was planned for admission and BP control. Was seen by Hospitalist Dr Thiago Massey felt safe for discharge to home. Amount and/or Complexity of Data Reviewed  Clinical lab tests: ordered  Tests in the radiology section of CPT®: ordered  Tests in the medicine section of CPT®: ordered  Obtain history from someone other than the patient: yes  Discuss the patient with other providers: yes  Independent visualization of images, tracings, or specimens: yes    Risk of Complications, Morbidity, and/or Mortality  Presenting problems: moderate  Diagnostic procedures: moderate  Management options: moderate    Patient Progress  Patient progress: stable    ED Course       Procedures    7:50 PM  Admitting to hospitalist service, discussed with Dr. Sofia Davis.    7:50 PM  Patient is being admitted to the hospital by Dr. Sofia Davis. The results of their tests and reasons for their admission have been discussed with them and/or available family. They convey agreement and understanding for the need to be admitted and for their admission diagnosis.   Consultation has been made with the inpatient physician specialist for hospitalization. 11:21 PM  After further discussion with hospitalist team Dr Andrea Cannon, at this point they feel patient does not require admission and should be discharged on librium. Discussed with patient, who agrees with plan. At this point will allow patient to finish banana bag and will also give blood pressure medicine and reassess. 11:51 PM  Discussed discharge plan with patient and wife at bedside. They agree with outpatient management with librium and agree to make appointment with Dr. Medhat Meraz on Friday. Discussed that withdrawal seizures are life threatening and that if symptoms worsen or are not controlled by outpatient therapy that they must return to the ER. Patient and wife expressed understanding and agreed with plan. Awaiting dose of atenolol which patient did not take today and will discharge. LABORATORY TESTS:  Recent Results (from the past 12 hour(s))   EKG, 12 LEAD, INITIAL    Collection Time: 02/01/17  4:21 PM   Result Value Ref Range    Ventricular Rate 121 BPM    Atrial Rate 121 BPM    P-R Interval 150 ms    QRS Duration 90 ms    Q-T Interval 306 ms    QTC Calculation (Bezet) 434 ms    Calculated P Axis 46 degrees    Calculated R Axis 74 degrees    Calculated T Axis 17 degrees    Diagnosis       Sinus tachycardia  Nonspecific ST and T wave abnormality  When compared with ECG of 03-NOV-2016 22:32,  T wave inversion no longer evident in Lateral leads     CBC WITH AUTOMATED DIFF    Collection Time: 02/01/17  4:31 PM   Result Value Ref Range    WBC 3.0 (L) 4.1 - 11.1 K/uL    RBC 4.13 4.10 - 5.70 M/uL    HGB 13.7 12.1 - 17.0 g/dL    HCT 41.7 36.6 - 50.3 %    .0 (H) 80.0 - 99.0 FL    MCH 33.2 26.0 - 34.0 PG    MCHC 32.9 30.0 - 36.5 g/dL    RDW 12.6 11.5 - 14.5 %    PLATELET 680 558 - 969 K/uL    NEUTROPHILS 33 32 - 75 %    LYMPHOCYTES 50 (H) 12 - 49 %    MONOCYTES 14 (H) 5 - 13 %    EOSINOPHILS 2 0 - 7 %    BASOPHILS 1 0 - 1 %    ABS. NEUTROPHILS 1.0 (L) 1.8 - 8.0 K/UL    ABS. LYMPHOCYTES 1.5 0.8 - 3.5 K/UL    ABS. MONOCYTES 0.4 0.0 - 1.0 K/UL    ABS. EOSINOPHILS 0.1 0.0 - 0.4 K/UL    ABS. BASOPHILS 0.0 0.0 - 0.1 K/UL    DF SMEAR SCANNED      RBC COMMENTS NORMOCYTIC, NORMOCHROMIC     METABOLIC PANEL, COMPREHENSIVE    Collection Time: 02/01/17  4:31 PM   Result Value Ref Range    Sodium 142 136 - 145 mmol/L    Potassium 3.5 3.5 - 5.1 mmol/L    Chloride 104 97 - 108 mmol/L    CO2 26 21 - 32 mmol/L    Anion gap 12 5 - 15 mmol/L    Glucose 122 (H) 65 - 100 mg/dL    BUN 4 (L) 6 - 20 MG/DL    Creatinine 0.81 0.70 - 1.30 MG/DL    BUN/Creatinine ratio 5 (L) 12 - 20      GFR est AA >60 >60 ml/min/1.73m2    GFR est non-AA >60 >60 ml/min/1.73m2    Calcium 8.9 8.5 - 10.1 MG/DL    Bilirubin, total 1.5 (H) 0.2 - 1.0 MG/DL    ALT (SGPT) 39 12 - 78 U/L    AST (SGOT) 61 (H) 15 - 37 U/L    Alk.  phosphatase 68 45 - 117 U/L    Protein, total 8.0 6.4 - 8.2 g/dL    Albumin 4.1 3.5 - 5.0 g/dL    Globulin 3.9 2.0 - 4.0 g/dL    A-G Ratio 1.1 1.1 - 2.2     ETHYL ALCOHOL    Collection Time: 02/01/17  4:31 PM   Result Value Ref Range    ALCOHOL(ETHYL),SERUM 244 (H) <10 MG/DL   URINALYSIS W/ REFLEX CULTURE    Collection Time: 02/01/17  5:39 PM   Result Value Ref Range    Color DARK YELLOW      Appearance CLEAR CLEAR      Specific gravity 1.009 1.003 - 1.030      pH (UA) 6.0 5.0 - 8.0      Protein TRACE (A) NEG mg/dL    Glucose NEGATIVE  NEG mg/dL    Ketone NEGATIVE  NEG mg/dL    Bilirubin NEGATIVE  NEG      Blood TRACE (A) NEG      Urobilinogen 1.0 0.2 - 1.0 EU/dL    Nitrites NEGATIVE  NEG      Leukocyte Esterase TRACE (A) NEG      WBC 0-4 0 - 4 /hpf    RBC 0-5 0 - 5 /hpf    Epithelial cells FEW FEW /lpf    Bacteria NEGATIVE  NEG /hpf    UA:UC IF INDICATED CULTURE NOT INDICATED BY UA RESULT CNI      Hyaline cast 0-2 0 - 5 /lpf       IMAGING RESULTS:  No orders to display       VITALS:  Patient Vitals for the past 12 hrs:   Temp Pulse Resp BP SpO2   02/01/17 2315 - 96 23 166/90 97 %   02/01/17 2300 - (!) 103 21 (!) 154/91 98 %   02/01/17 2245 - 90 21 137/88 97 %   02/01/17 2230 - 88 22 136/89 95 %   02/01/17 2215 - 92 21 140/86 98 %   02/01/17 2200 - 89 21 130/79 91 %   02/01/17 2145 - 89 23 (!) 136/92 96 %   02/01/17 2130 - 93 29 126/90 98 %   02/01/17 2115 - 98 (!) 34 (!) 136/96 95 %   02/01/17 2100 - 94 27 137/90 96 %   02/01/17 2045 - 87 23 130/89 95 %   02/01/17 2030 - 83 18 (!) 134/109 96 %   02/01/17 2015 - 78 23 137/75 95 %   02/01/17 2000 - 76 23 (!) 137/102 98 %   02/01/17 1728 - (!) 114 18 (!) 139/102 97 %   02/01/17 1615 98.6 °F (37 °C) (!) 125 18 (!) 150/120 95 %       MEDICATIONS GIVEN:  Medications   atenolol (TENORMIN) tablet 25 mg (not administered)   LORazepam (ATIVAN) injection 2 mg (2 mg IntraVENous Given 2/1/17 1859)   0.9% sodium chloride 1,000 mL with mvi, adult no. 4 with vit K 10 mL, thiamine 260 mg, folic acid 1 mg infusion ( IntraVENous New Bag 2/1/17 1947)       IMPRESSION:  1. Alcohol dependence with withdrawal with complication ([facility] [address]        PLAN:  1. Discharge with follow-up with PCP  Current Discharge Medication List      CONTINUE these medications which have CHANGED    Details   chlordiazePOXIDE (LIBRIUM) 5 mg capsule Take 2 Caps by mouth four (4) times daily as needed for Anxiety for up to 5 days. Max Daily Amount: 40 mg. Indications: ALCOHOL WITHDRAWAL SYNDROME  Qty: 40 Cap, Refills: 0         CONTINUE these medications which have NOT CHANGED    Details   lidocaine (LIDOCAINE VISCOUS) 2 % solution Take 15 mL by mouth three (3) times daily as needed for Pain. Qty: 100 mL, Refills: 0      guaiFENesin ER (MUCINEX) 600 mg ER tablet Take 1 Tab by mouth two (2) times daily as needed for Congestion. Qty: 10 Tab, Refills: 0      atenolol (TENORMIN) 25 mg tablet Take 25 mg by mouth daily. OTHER Indications: pt. taking blood pressure medication but doesn't know the name of it           2.    Follow-up Information     Follow up With Details Comments Contact Isai Bhat MD In 2 days for evaluation and treatment of alcohol withdrawal. [address]    [address] [address]  [phone]          3. Return to ED if worse     Discharge Note:  11:52 PM  The patient has been re-evaluated and is ready for discharge. Reviewed available results with patient. Counseled patient on diagnosis and care plan. Patient has expressed understanding, and all questions have been answered. Patient agrees with plan and agrees to follow up as recommended, or to return to the ED if their symptoms worsen. Discharge instructions have been provided and explained to the patient, along with reasons to return to the ED.

## 2017-02-01 NOTE — LETTER
[address] 
[facility] EMERGENCY DEPT 
[address] [id number] [phone] Work/School Note Date: 2/1/2017 To Whom It May concern: 
 
Noemi Zacarias was seen and treated today in the emergency room by the following provider(s): 
Attending Provider: Marisel Truong MD 
Resident: Joe Sullivan. Marimar Davalos. Noemi Zacarias may return to work on 2/6/17. Sincerely, 
 
 
 
 
Joe Sullivan. Marimar Davalos

## 2017-02-01 NOTE — ED NOTES
Assumed care of patient. Patient is alert and oriented, does not appear to be in distress. Patient ambulatory to ED with c/o alcohol withdraws. Pt states he usually drinks a 6 pack of beer everyday and he has been trying to quit. Pt has had withdrawal seizures in the past and feels like he may have another.

## 2017-02-02 LAB
ATRIAL RATE: 121 BPM
CALCULATED P AXIS, ECG09: 46 DEGREES
CALCULATED R AXIS, ECG10: 74 DEGREES
CALCULATED T AXIS, ECG11: 17 DEGREES
DIAGNOSIS, 93000: NORMAL
P-R INTERVAL, ECG05: 150 MS
Q-T INTERVAL, ECG07: 306 MS
QRS DURATION, ECG06: 90 MS
QTC CALCULATION (BEZET), ECG08: 434 MS
VENTRICULAR RATE, ECG03: 121 BPM

## 2017-02-02 PROCEDURE — 74011250637 HC RX REV CODE- 250/637: Performed by: EMERGENCY MEDICINE

## 2017-02-02 PROCEDURE — 74011250637 HC RX REV CODE- 250/637: Performed by: INTERNAL MEDICINE

## 2017-02-02 RX ORDER — POTASSIUM CHLORIDE 750 MG/1
40 TABLET, FILM COATED, EXTENDED RELEASE ORAL ONCE
Status: COMPLETED | OUTPATIENT
Start: 2017-02-02 | End: 2017-02-02

## 2017-02-02 RX ADMIN — ATENOLOL 25 MG: 25 TABLET ORAL at 00:17

## 2017-02-02 RX ADMIN — POTASSIUM CHLORIDE 40 MEQ: 750 TABLET, FILM COATED, EXTENDED RELEASE ORAL at 00:26

## 2017-02-02 NOTE — ED NOTES
Care assumed of patient @ this time & intro with rounding done, with report from ___Jovanni Tang RN________________. Patient resting quietly on stretcher without verbal complaints.

## 2017-02-02 NOTE — ED NOTES
Dr Daren Moran______________________ in to talk with patient and explain plan of care with  understanding and  written & verbal instructions.

## 2017-02-02 NOTE — ED NOTES
Dr Garima Rangel in to see patient & talked with Dr Bettina Rico & patient will be d/c home. Patient called wife to pick him up & IV still infusing.  Patient given [employer] dinner

## 2017-02-02 NOTE — ED NOTES
Bedside and Verbal shift change report given to Brittany Mata RN (oncoming nurse) by Sandie Gilman RN (offgoing nurse). Report included the following information SBAR, Kardex, ED Summary, [facility] and Recent Results.

## 2017-02-02 NOTE — CONSULTS
Hospitalist Consult Note    NAME: Denise Latif   :  1955   MRN:  [id number]     Date/Time:  2017 11:31 PM    Patient PCP: Yuan Meza MD    I have been asked to see this patient by the attending, Dr. Conan Cowden , for advice/opinion re: etoh use. My advice is:  ________________________________________________________________________   Assessment/Plan:  ETOH abuse  Hypertension, accelerated on benign essential  Leukopenia, suspect chronic due to ETOH effect on bone marrow  - upon arrival  -IVF completed  -librium to be offered at dc  -encourage use of home antihypertensives and antiseizure medicine - patient admits to noncompliance re use however  -encourage PO intake of nutritious foods and water when at home  -discussed with patient the use of etoh and use of AA programs to help him stop use. Further discussed importance of NOT imbibing while using librium. Patient verbalized understanding    hyopkalemia  -replaced PO    Safe for dc from ED at this time. Discussed possibility of DT's with patient and he verbalized understanding. Still wishes to return home at this time and I am in agreement with this plan. Subjective:   CHIEF COMPLAINT:  \"i felt weird\"    HISTORY OF PRESENT ILLNESS:     Shiv Colin is a 64 y.o.  male whom presents with complaint noted above. Patient with known ETOH abuse presents following consumption of a 6 pack of beer and multiple shots of vodka. He reported feeling LH/dizzy. In the past had hx of ETOH withdrawal seizures on medication as well as HTN associated with ETOH consumption on medication. He freely reports to only intermittent use of his medications. He has had no further seizures. No change in vision, no HA. No numbness/tingling. No cp/pressure/sob. No f/cn/v/d. No abdominal pains. Came to ED with fear \"something going to happen\" given consumption. In ED noted elevated BAL. He improved with IVF and tincture of time.  We were asked to see re ETOH use. Patient reports wanting to stop. Discussed AA. Discussed taking his medicine and following up with PCP. Past Medical History   Diagnosis Date    [facility]ism ([facility] [address]     Hypertension     Ill-defined condition      Hernia     Seizures ([facility] [address]      Withdrawal       Past Surgical History   Procedure Laterality Date    Hx hernia repair        Social History   Substance Use Topics    Smoking status: Never Smoker    Smokeless tobacco: Not on file    Alcohol use Yes      Comment: 1/2-1 pint vodka/day +/- beer      Family History   Problem Relation Age of Onset    Hypertension Other     Breast Cancer Other       Allergies   Allergen Reactions    Lisinopril Angioedema        Prior to Admission medications    Medication Sig Start Date End Date Taking? Authorizing Provider   chlordiazePOXIDE (LIBRIUM) 5 mg capsule Take 2 Caps by mouth four (4) times daily as needed for Anxiety for up to 5 days. Max Daily Amount: 40 mg. Indications: ALCOHOL WITHDRAWAL SYNDROME 2/1/17 2/6/17 Yes Aron Moran   lidocaine (LIDOCAINE VISCOUS) 2 % solution Take 15 mL by mouth three (3) times daily as needed for Pain. 1/24/17   Meri Clifford MD   guaiFENesin ER (MUCINEX) 600 mg ER tablet Take 1 Tab by mouth two (2) times daily as needed for Congestion. 1/24/17   Meri Clifford MD   atenolol (TENORMIN) 25 mg tablet Take 25 mg by mouth daily. Debi Cruz MD   OTHER Indications: pt. taking blood pressure medication but doesn't know the name of it    Debi Cruz MD     Current Facility-Administered Medications   Medication Dose Route Frequency    atenolol (TENORMIN) tablet 25 mg  25 mg Oral NOW     Current Outpatient Prescriptions   Medication Sig    chlordiazePOXIDE (LIBRIUM) 5 mg capsule Take 2 Caps by mouth four (4) times daily as needed for Anxiety for up to 5 days. Max Daily Amount: 40 mg.  Indications: ALCOHOL WITHDRAWAL SYNDROME    lidocaine (LIDOCAINE VISCOUS) 2 % solution Take 15 mL by mouth three (3) times daily as needed for Pain.  guaiFENesin ER (MUCINEX) 600 mg ER tablet Take 1 Tab by mouth two (2) times daily as needed for Congestion.  atenolol (TENORMIN) 25 mg tablet Take 25 mg by mouth daily.     OTHER Indications: pt. taking blood pressure medication but doesn't know the name of it      REVIEW OF SYSTEMS:    General: negative for fever, chills, sweats, + occasional weakness, no weight loss  Eyes: negative for blurred vision, eye pain, loss of vision, diplopia  Ear Nose and Throat: negative for rhinorrhea, pharyngitis, otalgia, tinnitus, speech or swallowing difficulties  Respiratory:  negative for cough, sputum production, SOB, wheezing, AMEZQUITA, pleuritic pain  Cardiology:  negative for chest pain, palpitations, orthopnea, PND, edema, syncope   Gastrointestinal: negative for abdominal pain, N/V, dysphagia, change in bowel habits, bleeding  Genitourinary: negative for frequency, urgency, dysuria, hematuria, incontinence  Muskuloskeletal : negative for arthralgia, myalgia  Hematology: negative for easy bruising, bleeding, lymphadenopathy  Dermatological: negative for rash, ulceration, mole change, new lesion  Endocrine: negative for hot flashes or polydipsia  Neurological: negative for headache, dizziness, confusion, focal weakness, paresthesia, memory loss, gait disturbance, +LH  Psychological: negative for anxiety, depression, agitation    Objective:   VITALS:    Visit Vitals    /90    Pulse 96    Temp 98.6 °F (37 °C)    Resp 23    Ht 5' 9\" (1.753 m)    Wt 78.8 kg (173 lb 11.6 oz)    SpO2 97%    BMI 25.65 kg/m2     PHYSICAL EXAM:     GENERAL:    WD y   WN y   Cachectic    Thin    Obese    Disheveled y   Ill Appearing Critically n   Ill Appearing Chronically y   Acute Distress n   Other      HEENT:    NC/AT/EOMI y   PERRLA y   Conjunctivae Pink    Conjunctivae Pale y   Moist Mucosa    Dry Mucosa y   Hearing intact to voice y   Other      NECK:    Supple y   Masses n   Thyroid Tender n   Other RESPIRATORY:    CTA bilaterally WITHOUT wheezing/rhonchi/rales or crackles y   Wheezing    Rhonchi    Crackles    Use of accessory muscles n   Other      CARDIAC:    regular rate and rhythm No murmurs/rubs/gallops y   Murmur    Rubs    Gallops    Rate Regular/Irregular reg   Carotid Bruit Left/Right n   Lower Extremity Edema n   JVP  n   Other      ABDOMEN:    soft/non distended non tender +bowel sounds no HSM y   Rigid    Tenderness    Hepatomegaly    Splenomegaly    Distended n   Normal/Hyper/Hypo Active Bowel Sounds nml   Other      SKIN / MUSCULOSKELETAL:    Rashes n   Ecchymosis n   Ulcers    Tight to palpitation    Turgor Good/Poor g   Cyanosis/Clubbing n   Amputation(s) n   Other      NEUROLOGY:    cranial nerves II-XII grossly intact y   Cranial Nerve Deficit    Facial Droop    Slurred Speech    Aphasia    Strength Normal y   Weakness n   Meningismus/Kernig's Sign/ Brudzinsky n   Follows Commands y   Other      PSYCHIATRIC:    AAOx3 in no acute distress y   Insight Poor    Insight Good y   Alert and Oriented to Person     Alert and Oriented to Place    Alert and Oriented toTime    Depressed    Anxious n   Agitated n   Lethargic n   Stuporous n   Sedated    Other      ________________________________________________________________________  Care Plan discussed with:    Comments   Patient y Discussed POC with patient and cessation of ETOH 15    Family   None at bedside   RN y 5   Care Manager                    Consultant:  leanna Urena md 5   ________________________________________________________________________  TOTAL TIME:  50    Comments   >50% of visit spent in counseling and coordination of care x See above     Critical Care Provided     Minutes non procedure based  ________________________________________________________________________  Signed:  Maya Long MD    Procedures: see electronic medical records for all procedures/Xrays and details which were not copied into this note but were reviewed prior to creation of Plan. LAB DATA REVIEWED:    Recent Results (from the past 24 hour(s))   EKG, 12 LEAD, INITIAL    Collection Time: 02/01/17  4:21 PM   Result Value Ref Range    Ventricular Rate 121 BPM    Atrial Rate 121 BPM    P-R Interval 150 ms    QRS Duration 90 ms    Q-T Interval 306 ms    QTC Calculation (Bezet) 434 ms    Calculated P Axis 46 degrees    Calculated R Axis 74 degrees    Calculated T Axis 17 degrees    Diagnosis       Sinus tachycardia  Nonspecific ST and T wave abnormality  When compared with ECG of 03-NOV-2016 22:32,  T wave inversion no longer evident in Lateral leads     CBC WITH AUTOMATED DIFF    Collection Time: 02/01/17  4:31 PM   Result Value Ref Range    WBC 3.0 (L) 4.1 - 11.1 K/uL    RBC 4.13 4.10 - 5.70 M/uL    HGB 13.7 12.1 - 17.0 g/dL    HCT 41.7 36.6 - 50.3 %    .0 (H) 80.0 - 99.0 FL    MCH 33.2 26.0 - 34.0 PG    MCHC 32.9 30.0 - 36.5 g/dL    RDW 12.6 11.5 - 14.5 %    PLATELET 955 757 - 884 K/uL    NEUTROPHILS 33 32 - 75 %    LYMPHOCYTES 50 (H) 12 - 49 %    MONOCYTES 14 (H) 5 - 13 %    EOSINOPHILS 2 0 - 7 %    BASOPHILS 1 0 - 1 %    ABS. NEUTROPHILS 1.0 (L) 1.8 - 8.0 K/UL    ABS. LYMPHOCYTES 1.5 0.8 - 3.5 K/UL    ABS. MONOCYTES 0.4 0.0 - 1.0 K/UL    ABS. EOSINOPHILS 0.1 0.0 - 0.4 K/UL    ABS.  BASOPHILS 0.0 0.0 - 0.1 K/UL    DF SMEAR SCANNED      RBC COMMENTS NORMOCYTIC, NORMOCHROMIC     METABOLIC PANEL, COMPREHENSIVE    Collection Time: 02/01/17  4:31 PM   Result Value Ref Range    Sodium 142 136 - 145 mmol/L    Potassium 3.5 3.5 - 5.1 mmol/L    Chloride 104 97 - 108 mmol/L    CO2 26 21 - 32 mmol/L    Anion gap 12 5 - 15 mmol/L    Glucose 122 (H) 65 - 100 mg/dL    BUN 4 (L) 6 - 20 MG/DL    Creatinine 0.81 0.70 - 1.30 MG/DL    BUN/Creatinine ratio 5 (L) 12 - 20      GFR est AA >60 >60 ml/min/1.73m2    GFR est non-AA >60 >60 ml/min/1.73m2    Calcium 8.9 8.5 - 10.1 MG/DL    Bilirubin, total 1.5 (H) 0.2 - 1.0 MG/DL    ALT (SGPT) 39 12 - 78 U/L    AST (SGOT) 61 (H) 15 - 37 U/L    Alk.  phosphatase 68 45 - 117 U/L    Protein, total 8.0 6.4 - 8.2 g/dL    Albumin 4.1 3.5 - 5.0 g/dL    Globulin 3.9 2.0 - 4.0 g/dL    A-G Ratio 1.1 1.1 - 2.2     ETHYL ALCOHOL    Collection Time: 02/01/17  4:31 PM   Result Value Ref Range    ALCOHOL(ETHYL),SERUM 244 (H) <10 MG/DL   URINALYSIS W/ REFLEX CULTURE    Collection Time: 02/01/17  5:39 PM   Result Value Ref Range    Color DARK YELLOW      Appearance CLEAR CLEAR      Specific gravity 1.009 1.003 - 1.030      pH (UA) 6.0 5.0 - 8.0      Protein TRACE (A) NEG mg/dL    Glucose NEGATIVE  NEG mg/dL    Ketone NEGATIVE  NEG mg/dL    Bilirubin NEGATIVE  NEG      Blood TRACE (A) NEG      Urobilinogen 1.0 0.2 - 1.0 EU/dL    Nitrites NEGATIVE  NEG      Leukocyte Esterase TRACE (A) NEG      WBC 0-4 0 - 4 /hpf    RBC 0-5 0 - 5 /hpf    Epithelial cells FEW FEW /lpf    Bacteria NEGATIVE  NEG /hpf    UA:UC IF INDICATED CULTURE NOT INDICATED BY UA RESULT CNI      Hyaline cast 0-2 0 - 5 /lpf

## 2017-02-02 NOTE — ED NOTES
Bedside and Verbal shift change report received from Shauna Johnson RN (offgoing nurse) given to Fritz Hylton Rn (oncoming nurse). Report included the following information SBAR, Kardex, ED Summary, [facility] and Recent Results.

## 2017-02-02 NOTE — DISCHARGE INSTRUCTIONS
Please take the librium every 6 hours as needed for withdrawal symptoms. We encourage you to stop drinking alcohol and utilize support groups in your area, such as AA. Please follow up with Dr. Teetee Cody later this week regarding further management of your alcohol withdrawal.  Return to the ED if your symptoms change or worsen in nature. Alcohol and Drug Problems: Care Instructions  Your Care Instructions  You can improve your life and health by stopping your use of alcohol or drugs. Ending dependency on alcohol or drugs may help you feel and sleep better. You may get along better with your family, friends, and coworkers. There are medicines and programs that can help. Follow-up care is a key part of your treatment and safety. Be sure to make and go to all appointments, and call your doctor if you are having problems. It's also a good idea to know your test results and keep a list of the medicines you take. How can you care for yourself at home? · If you have been given medicine to help keep you sober or reduce your cravings, be sure to take it as prescribed. · Talk to your doctor about programs that can help you stop using drugs or drinking alcohol. · If your doctor prescribes disulfiram (Antabuse), do not drink any alcohol while you are taking this medicine. You may have severe, even life-threatening, side effects from even small amounts of alcohol. · Do not tempt yourself by keeping alcohol or drugs in your home. · Learn how to say no when other people drink or use drugs. Or don't spend time with people who drink or use drugs. · Use the time and money spent on drinking or drugs to do something fun with your family or friends. Preventing a relapse  · Do not drink alcohol or use drugs at all. Using any amount of alcohol or drugs greatly increases your risk for relapse.   · Seek help from organizations such as Alcoholics Anonymous, Narcotics Anonymous, or treatment facilities if you feel the need to drink alcohol or use drugs again. · Remember that recovery is a lifelong process. · Stay away from situations, friends, or places that may lead you to drink or use drugs. · Have a plan to spot and deal with relapse. Learn to recognize changes in your thinking that lead you to drink or use drugs. These are warning signs. Get help before you start to drink or use drugs again. · Get help as soon as you can if you relapse. Some people make a plan with another person that outlines what they want that person to do for them if they relapse. The plan usually includes how to handle the relapse and who to notify in case of relapse. · Don't give up. Remember that a relapse does not mean that you have failed. Use the experience to learn the triggers that lead you to drink or use drugs. Then quit again. Many people have several relapses before they are able to quit for good. When should you call for help? Call 911 anytime you think you may need emergency care. For example, call if:  · You feel you cannot stop from hurting yourself or someone else. Call your doctor now or seek immediate medical care if:  · You have serious withdrawal symptoms such as confusion, hallucinations, or severe trembling. Watch closely for changes in your health, and be sure to contact your doctor if:  · You have a relapse. · You need more help or support to stop. Where can you learn more? Go to http://juju-yamileth.info/. Enter [id number] in the search box to learn more about \"Alcohol and Drug Problems: Care Instructions. \"  Current as of: February 24, 2016  Content Version: 11.1  © 6853-9577 [employer]. Care instructions adapted under license by [employer] (which disclaims liability or warranty for this information).  If you have questions about a medical condition or this instruction, always ask your healthcare professional. [address] any warranty or liability for your use of this information.

## 2017-07-09 ENCOUNTER — APPOINTMENT (OUTPATIENT)
Dept: ULTRASOUND IMAGING | Age: 62
DRG: 683 | End: 2017-07-09
Attending: EMERGENCY MEDICINE
Payer: COMMERCIAL

## 2017-07-09 ENCOUNTER — APPOINTMENT (OUTPATIENT)
Dept: GENERAL RADIOLOGY | Age: 62
DRG: 683 | End: 2017-07-09
Attending: EMERGENCY MEDICINE
Payer: COMMERCIAL

## 2017-07-09 ENCOUNTER — HOSPITAL ENCOUNTER (INPATIENT)
Age: 62
LOS: 7 days | Discharge: HOME | DRG: 683 | End: 2017-07-16
Attending: EMERGENCY MEDICINE | Admitting: INTERNAL MEDICINE
Payer: COMMERCIAL

## 2017-07-09 DIAGNOSIS — F10.930 ALCOHOL WITHDRAWAL, UNCOMPLICATED (HCC): ICD-10-CM

## 2017-07-09 DIAGNOSIS — N17.9 ACUTE RENAL FAILURE, UNSPECIFIED ACUTE RENAL FAILURE TYPE (HCC): Primary | ICD-10-CM

## 2017-07-09 PROBLEM — I10 HTN (HYPERTENSION): Status: ACTIVE | Noted: 2017-07-09

## 2017-07-09 PROBLEM — K76.0 HEPATIC STEATOSIS: Status: ACTIVE | Noted: 2017-07-09

## 2017-07-09 PROBLEM — Z86.73 HISTORY OF CVA (CEREBROVASCULAR ACCIDENT): Status: ACTIVE | Noted: 2017-07-09

## 2017-07-09 PROBLEM — N39.0 UTI (URINARY TRACT INFECTION): Status: ACTIVE | Noted: 2017-07-09

## 2017-07-09 PROBLEM — F10.20 CHRONIC ALCOHOLISM (HCC): Status: ACTIVE | Noted: 2017-07-09

## 2017-07-09 LAB
ALBUMIN SERPL BCP-MCNC: 4.2 G/DL (ref 3.5–5)
ALBUMIN/GLOB SERPL: 1.1 {RATIO} (ref 1.1–2.2)
ALP SERPL-CCNC: 53 U/L (ref 45–117)
ALT SERPL-CCNC: 47 U/L (ref 12–78)
AMPHET UR QL SCN: NEGATIVE
ANION GAP BLD CALC-SCNC: 18 MMOL/L (ref 5–15)
APPEARANCE UR: ABNORMAL
AST SERPL W P-5'-P-CCNC: 42 U/L (ref 15–37)
BACTERIA URNS QL MICRO: ABNORMAL /HPF
BARBITURATES UR QL SCN: NEGATIVE
BASOPHILS # BLD AUTO: 0 K/UL (ref 0–0.1)
BASOPHILS # BLD: 0 % (ref 0–1)
BENZODIAZ UR QL: POSITIVE
BILIRUB SERPL-MCNC: 1.9 MG/DL (ref 0.2–1)
BILIRUB UR QL CFM: NEGATIVE
BUN SERPL-MCNC: 34 MG/DL (ref 6–20)
BUN/CREAT SERPL: 4 (ref 12–20)
CALCIUM SERPL-MCNC: 8.7 MG/DL (ref 8.5–10.1)
CANNABINOIDS UR QL SCN: NEGATIVE
CHLORIDE SERPL-SCNC: 93 MMOL/L (ref 97–108)
CK SERPL-CCNC: 183 U/L (ref 39–308)
CO2 SERPL-SCNC: 22 MMOL/L (ref 21–32)
COCAINE UR QL SCN: NEGATIVE
COLOR UR: ABNORMAL
CREAT SERPL-MCNC: 8.58 MG/DL (ref 0.7–1.3)
CREAT UR-MCNC: 195 MG/DL
DEPRECATED S PYO AG THROAT QL EIA: NEGATIVE
DIFFERENTIAL METHOD BLD: ABNORMAL
DRUG SCRN COMMENT,DRGCM: ABNORMAL
EOSINOPHIL # BLD: 0 K/UL (ref 0–0.4)
EOSINOPHIL NFR BLD: 0 % (ref 0–7)
EPITH CASTS URNS QL MICRO: ABNORMAL /LPF
ERYTHROCYTE [DISTWIDTH] IN BLOOD BY AUTOMATED COUNT: 13.7 % (ref 11.5–14.5)
ETHANOL SERPL-MCNC: 161 MG/DL
GLOBULIN SER CALC-MCNC: 3.8 G/DL (ref 2–4)
GLUCOSE SERPL-MCNC: 100 MG/DL (ref 65–100)
GLUCOSE UR STRIP.AUTO-MCNC: NEGATIVE MG/DL
HCT VFR BLD AUTO: 38.6 % (ref 36.6–50.3)
HGB BLD-MCNC: 12.9 G/DL (ref 12.1–17)
HGB UR QL STRIP: ABNORMAL
KETONES UR QL STRIP.AUTO: ABNORMAL MG/DL
LEUKOCYTE ESTERASE UR QL STRIP.AUTO: ABNORMAL
LIPASE SERPL-CCNC: 267 U/L (ref 73–393)
LYMPHOCYTES # BLD AUTO: 8 % (ref 12–49)
LYMPHOCYTES # BLD: 0.4 K/UL (ref 0.8–3.5)
MAGNESIUM SERPL-MCNC: 2 MG/DL (ref 1.6–2.4)
MAGNESIUM SERPL-MCNC: 2.3 MG/DL (ref 1.6–2.4)
MCH RBC QN AUTO: 34 PG (ref 26–34)
MCHC RBC AUTO-ENTMCNC: 33.4 G/DL (ref 30–36.5)
MCV RBC AUTO: 101.8 FL (ref 80–99)
METHADONE UR QL: NEGATIVE
MONOCYTES # BLD: 0.7 K/UL (ref 0–1)
MONOCYTES NFR BLD AUTO: 12 % (ref 5–13)
MUCOUS THREADS URNS QL MICRO: ABNORMAL /LPF
NEUTS SEG # BLD: 4.5 K/UL (ref 1.8–8)
NEUTS SEG NFR BLD AUTO: 80 % (ref 32–75)
NITRITE UR QL STRIP.AUTO: NEGATIVE
OPIATES UR QL: NEGATIVE
PCP UR QL: NEGATIVE
PH UR STRIP: 5.5 [PH] (ref 5–8)
PLATELET # BLD AUTO: 160 K/UL (ref 150–400)
POTASSIUM SERPL-SCNC: 3.7 MMOL/L (ref 3.5–5.1)
PROT SERPL-MCNC: 8 G/DL (ref 6.4–8.2)
PROT UR STRIP-MCNC: 300 MG/DL
PROT UR-MCNC: 306 MG/DL (ref 0–11.9)
PROT/CREAT UR-RTO: 1.6
RBC # BLD AUTO: 3.79 M/UL (ref 4.1–5.7)
RBC #/AREA URNS HPF: ABNORMAL /HPF (ref 0–5)
RBC MORPH BLD: ABNORMAL
SODIUM SERPL-SCNC: 133 MMOL/L (ref 136–145)
SODIUM UR-SCNC: 59 MMOL/L
SP GR UR REFRACTOMETRY: 1.02 (ref 1–1.03)
TROPONIN I SERPL-MCNC: <0.04 NG/ML
UROBILINOGEN UR QL STRIP.AUTO: 0.2 EU/DL (ref 0.2–1)
WBC # BLD AUTO: 5.6 K/UL (ref 4.1–11.1)
WBC URNS QL MICRO: ABNORMAL /HPF (ref 0–4)

## 2017-07-09 PROCEDURE — 93005 ELECTROCARDIOGRAM TRACING: CPT

## 2017-07-09 PROCEDURE — 65660000000 HC RM CCU STEPDOWN

## 2017-07-09 PROCEDURE — 80053 COMPREHEN METABOLIC PANEL: CPT | Performed by: EMERGENCY MEDICINE

## 2017-07-09 PROCEDURE — 87086 URINE CULTURE/COLONY COUNT: CPT | Performed by: INTERNAL MEDICINE

## 2017-07-09 PROCEDURE — 65610000006 HC RM INTENSIVE CARE

## 2017-07-09 PROCEDURE — 74011000250 HC RX REV CODE- 250: Performed by: EMERGENCY MEDICINE

## 2017-07-09 PROCEDURE — 87880 STREP A ASSAY W/OPTIC: CPT | Performed by: INTERNAL MEDICINE

## 2017-07-09 PROCEDURE — 74011250637 HC RX REV CODE- 250/637: Performed by: EMERGENCY MEDICINE

## 2017-07-09 PROCEDURE — 83735 ASSAY OF MAGNESIUM: CPT | Performed by: INTERNAL MEDICINE

## 2017-07-09 PROCEDURE — 74011000258 HC RX REV CODE- 258: Performed by: EMERGENCY MEDICINE

## 2017-07-09 PROCEDURE — 74011000258 HC RX REV CODE- 258: Performed by: INTERNAL MEDICINE

## 2017-07-09 PROCEDURE — 96374 THER/PROPH/DIAG INJ IV PUSH: CPT

## 2017-07-09 PROCEDURE — 83735 ASSAY OF MAGNESIUM: CPT | Performed by: EMERGENCY MEDICINE

## 2017-07-09 PROCEDURE — 84156 ASSAY OF PROTEIN URINE: CPT | Performed by: INTERNAL MEDICINE

## 2017-07-09 PROCEDURE — 80307 DRUG TEST PRSMV CHEM ANLYZR: CPT | Performed by: EMERGENCY MEDICINE

## 2017-07-09 PROCEDURE — 99285 EMERGENCY DEPT VISIT HI MDM: CPT

## 2017-07-09 PROCEDURE — 85025 COMPLETE CBC W/AUTO DIFF WBC: CPT | Performed by: EMERGENCY MEDICINE

## 2017-07-09 PROCEDURE — 96361 HYDRATE IV INFUSION ADD-ON: CPT

## 2017-07-09 PROCEDURE — 77030037878 HC DRSG MEPILEX >48IN BORD MOLN -B

## 2017-07-09 PROCEDURE — 81001 URINALYSIS AUTO W/SCOPE: CPT | Performed by: EMERGENCY MEDICINE

## 2017-07-09 PROCEDURE — 36415 COLL VENOUS BLD VENIPUNCTURE: CPT | Performed by: INTERNAL MEDICINE

## 2017-07-09 PROCEDURE — 82550 ASSAY OF CK (CPK): CPT | Performed by: EMERGENCY MEDICINE

## 2017-07-09 PROCEDURE — 96375 TX/PRO/DX INJ NEW DRUG ADDON: CPT

## 2017-07-09 PROCEDURE — 83690 ASSAY OF LIPASE: CPT | Performed by: EMERGENCY MEDICINE

## 2017-07-09 PROCEDURE — 74011250636 HC RX REV CODE- 250/636: Performed by: INTERNAL MEDICINE

## 2017-07-09 PROCEDURE — 84484 ASSAY OF TROPONIN QUANT: CPT | Performed by: EMERGENCY MEDICINE

## 2017-07-09 PROCEDURE — 74011250636 HC RX REV CODE- 250/636: Performed by: EMERGENCY MEDICINE

## 2017-07-09 PROCEDURE — 74011000250 HC RX REV CODE- 250: Performed by: INTERNAL MEDICINE

## 2017-07-09 PROCEDURE — 65270000029 HC RM PRIVATE

## 2017-07-09 PROCEDURE — 76770 US EXAM ABDO BACK WALL COMP: CPT

## 2017-07-09 PROCEDURE — 71020 XR CHEST PA LAT: CPT

## 2017-07-09 PROCEDURE — 84300 ASSAY OF URINE SODIUM: CPT | Performed by: INTERNAL MEDICINE

## 2017-07-09 PROCEDURE — 87070 CULTURE OTHR SPECIMN AEROBIC: CPT | Performed by: INTERNAL MEDICINE

## 2017-07-09 PROCEDURE — 87205 SMEAR GRAM STAIN: CPT | Performed by: INTERNAL MEDICINE

## 2017-07-09 RX ORDER — ONDANSETRON 2 MG/ML
4 INJECTION INTRAMUSCULAR; INTRAVENOUS
Status: COMPLETED | OUTPATIENT
Start: 2017-07-09 | End: 2017-07-09

## 2017-07-09 RX ORDER — SODIUM CHLORIDE 0.9 % (FLUSH) 0.9 %
5-10 SYRINGE (ML) INJECTION EVERY 8 HOURS
Status: DISCONTINUED | OUTPATIENT
Start: 2017-07-09 | End: 2017-07-16 | Stop reason: HOSPADM

## 2017-07-09 RX ORDER — FOLIC ACID 1 MG/1
1 TABLET ORAL DAILY
Status: DISCONTINUED | OUTPATIENT
Start: 2017-07-10 | End: 2017-07-16 | Stop reason: HOSPADM

## 2017-07-09 RX ORDER — ATENOLOL 25 MG/1
25 TABLET ORAL DAILY
Status: DISCONTINUED | OUTPATIENT
Start: 2017-07-10 | End: 2017-07-10

## 2017-07-09 RX ORDER — LORAZEPAM 1 MG/1
2 TABLET ORAL
Status: DISCONTINUED | OUTPATIENT
Start: 2017-07-09 | End: 2017-07-16 | Stop reason: HOSPADM

## 2017-07-09 RX ORDER — HEPARIN SODIUM 5000 [USP'U]/ML
5000 INJECTION, SOLUTION INTRAVENOUS; SUBCUTANEOUS EVERY 8 HOURS
Status: DISCONTINUED | OUTPATIENT
Start: 2017-07-09 | End: 2017-07-10

## 2017-07-09 RX ORDER — LORAZEPAM 2 MG/ML
2 INJECTION INTRAMUSCULAR
Status: DISCONTINUED | OUTPATIENT
Start: 2017-07-09 | End: 2017-07-13

## 2017-07-09 RX ORDER — MAGNESIUM SULFATE 1 G/100ML
1 INJECTION INTRAVENOUS ONCE
Status: COMPLETED | OUTPATIENT
Start: 2017-07-09 | End: 2017-07-12

## 2017-07-09 RX ORDER — LORAZEPAM 1 MG/1
4 TABLET ORAL
Status: DISCONTINUED | OUTPATIENT
Start: 2017-07-09 | End: 2017-07-16 | Stop reason: HOSPADM

## 2017-07-09 RX ORDER — LANOLIN ALCOHOL/MO/W.PET/CERES
100 CREAM (GRAM) TOPICAL DAILY
Status: DISCONTINUED | OUTPATIENT
Start: 2017-07-10 | End: 2017-07-16 | Stop reason: HOSPADM

## 2017-07-09 RX ORDER — SODIUM CHLORIDE 0.9 % (FLUSH) 0.9 %
5-10 SYRINGE (ML) INJECTION AS NEEDED
Status: DISCONTINUED | OUTPATIENT
Start: 2017-07-09 | End: 2017-07-16 | Stop reason: HOSPADM

## 2017-07-09 RX ORDER — LORAZEPAM 2 MG/ML
1 INJECTION INTRAMUSCULAR
Status: DISCONTINUED | OUTPATIENT
Start: 2017-07-09 | End: 2017-07-13

## 2017-07-09 RX ORDER — DIAZEPAM 10 MG/2ML
5 INJECTION INTRAMUSCULAR
Status: COMPLETED | OUTPATIENT
Start: 2017-07-09 | End: 2017-07-09

## 2017-07-09 RX ORDER — SODIUM CHLORIDE 9 MG/ML
125 INJECTION, SOLUTION INTRAVENOUS CONTINUOUS
Status: DISCONTINUED | OUTPATIENT
Start: 2017-07-09 | End: 2017-07-09

## 2017-07-09 RX ORDER — FAMOTIDINE 10 MG/ML
20 INJECTION INTRAVENOUS
Status: COMPLETED | OUTPATIENT
Start: 2017-07-09 | End: 2017-07-09

## 2017-07-09 RX ORDER — DEXTROSE MONOHYDRATE AND SODIUM CHLORIDE 5; .45 G/100ML; G/100ML
125 INJECTION, SOLUTION INTRAVENOUS CONTINUOUS
Status: DISCONTINUED | OUTPATIENT
Start: 2017-07-09 | End: 2017-07-10

## 2017-07-09 RX ORDER — LORAZEPAM 2 MG/ML
INJECTION INTRAMUSCULAR
Status: DISPENSED
Start: 2017-07-09 | End: 2017-07-10

## 2017-07-09 RX ADMIN — LIDOCAINE HYDROCHLORIDE 40 ML: 20 SOLUTION ORAL; TOPICAL at 11:54

## 2017-07-09 RX ADMIN — MAGNESIUM SULFATE HEPTAHYDRATE 1 G: 1 INJECTION, SOLUTION INTRAVENOUS at 17:38

## 2017-07-09 RX ADMIN — HEPARIN SODIUM 5000 UNITS: 5000 INJECTION, SOLUTION INTRAVENOUS; SUBCUTANEOUS at 16:49

## 2017-07-09 RX ADMIN — FAMOTIDINE 20 MG: 10 INJECTION, SOLUTION INTRAVENOUS at 09:54

## 2017-07-09 RX ADMIN — ONDANSETRON 4 MG: 2 INJECTION INTRAMUSCULAR; INTRAVENOUS at 09:54

## 2017-07-09 RX ADMIN — CEFTRIAXONE 1 G: 1 INJECTION, POWDER, FOR SOLUTION INTRAMUSCULAR; INTRAVENOUS at 14:15

## 2017-07-09 RX ADMIN — LORAZEPAM 2 MG: 2 INJECTION INTRAMUSCULAR; INTRAVENOUS at 15:58

## 2017-07-09 RX ADMIN — DIAZEPAM 5 MG: 5 INJECTION, SOLUTION INTRAMUSCULAR; INTRAVENOUS at 09:54

## 2017-07-09 RX ADMIN — FOLIC ACID: 5 INJECTION, SOLUTION INTRAMUSCULAR; INTRAVENOUS; SUBCUTANEOUS at 16:35

## 2017-07-09 RX ADMIN — Medication 10 ML: at 15:58

## 2017-07-09 RX ADMIN — LORAZEPAM 2 MG: 2 INJECTION INTRAMUSCULAR; INTRAVENOUS at 17:05

## 2017-07-09 RX ADMIN — SODIUM CHLORIDE 2000 ML: 900 INJECTION, SOLUTION INTRAVENOUS at 09:54

## 2017-07-09 RX ADMIN — ONDANSETRON 4 MG: 2 INJECTION INTRAMUSCULAR; INTRAVENOUS at 13:40

## 2017-07-09 RX ADMIN — SODIUM CHLORIDE 0.2 MCG/KG/HR: 900 INJECTION, SOLUTION INTRAVENOUS at 17:39

## 2017-07-09 NOTE — ED NOTES
TRANSFER - OUT REPORT:    Verbal report given to Vanessa(name) on Alan Ahn  being transferred to CCU(unit) for ordered procedure       Report consisted of patients Situation, Background, Assessment and   Recommendations(SBAR). Information from the following report(s) SBAR, Kardex and Intake/Output was reviewed with the receiving nurse. Lines:       Opportunity for questions and clarification was provided.       Patient transported with:   Monitor  Registered Nurse

## 2017-07-09 NOTE — CONSULTS
Consultation Note    NAME: aSri Toro   :  1955   MRN:  [id number]     Date/Time:  2017 2:43 PM    I have been asked to see this patient by Dr. Lizbet Carballo  for advice/opinion re: CLAUDIA. Assessment :    Plan:  CLAUDIA  Mild hyponatremia  + AG  Etoh use  Etoh liver dz  HTN   Creatinine 0.81 in . Now 8.6. Urine with protein and blood since 7226    Certainly a component of dehydration. If creatinine fails to improve would want to send serologic w/u. Continue with volume resuscitation. Subjective:   CHIEF COMPLAINT: claudia    HISTORY OF PRESENT ILLNESS:     Zeinab Menchaca is a 58 y.o.   male who has a history of htn and heavy etoh use. He states he is having withdrawals, but doesn't seem as if he stopped drinking. 6 pound weight loss, poor appetite and po intake. Tachycardia and lightheaded on presentation - improved after ivf's. No rash. + n/v. + cough. No nosebleeds. Occasional diarrhea. No chan. Denies decreased urination. No hematuria. No foamy urine. Only occasional NSAID use. No kidney disease in the family. Past Medical History:   Diagnosis Date    Alcoholism ([address]     Hypertension     Ill-defined condition     Hernia     Seizures (HCC)     Withdrawal       Past Surgical History:   Procedure Laterality Date    HX HERNIA REPAIR       Social History   Substance Use Topics    Smoking status: Never Smoker    Smokeless tobacco: Never Used    Alcohol use Yes      Comment: 2- pint vodka/day +/- beer      Family History   Problem Relation Age of Onset    Hypertension Other     Breast Cancer Other       Allergies   Allergen Reactions    Lisinopril Angioedema      Prior to Admission medications    Medication Sig Start Date End Date Taking? Authorizing Provider   atenolol (TENORMIN) 25 mg tablet Take 25 mg by mouth daily. Yes Phys MD Anthony   lidocaine (LIDOCAINE VISCOUS) 2 % solution Take 15 mL by mouth three (3) times daily as needed for Pain.  17   Junior Simpson MD guaiFENesin ER (MUCINEX) 600 mg ER tablet Take 1 Tab by mouth two (2) times daily as needed for Congestion.  1/24/17   Romana Richter, MD   OTHER Indications: pt. taking blood pressure medication but doesn't know the name of it    Debi Cruz MD     REVIEW OF SYSTEMS:     []  Unable to obtain reliable ROS due to  [] mental status  [] sedated   [] intubated   [x] Total of 12 systems reviewed as follows:  Constitutional: negative fever, negative chills, + weight loss  Eyes:   negative visual changes  ENT:   negative sore throat, tongue or lip swelling  Respiratory:  + cough, negative dyspnea  Cards:  negative for chest pain, palpitations, lower extremity edema  GI:   + nausea, vomiting, diarrhea, no abdominal pain  :  negative for frequency, dysuria  Integument:  negative for rash and pruritus  Heme:  negative for easy bruising and gum/nose bleeding  Musculoskel: negative for myalgias,  back pain and muscle weakness  Neuro:  negative for headaches,  vertigo  Psych:  negative for feelings of anxiety, depression   Travel?: none    Objective:   VITALS:    Visit Vitals    /74    Pulse 86    Temp 98 °F (36.7 °C)    Resp 26    Ht 5' 9\" (1.753 m)    Wt 77.2 kg (170 lb 3.1 oz)    SpO2 99%    BMI 25.13 kg/m2     PHYSICAL EXAM:  Gen:  []  WD []  WN  [] cachectic [x]  thin []  obese []  disheveled             [x]  ill apearing  []   Critical  [x]   Chronic    [x]  No acute distress    HEENT:   [x] NC/AT/PERRLA/EOMI    [] pink conjunctivae      [] pale conjunctivae                  PERRL  [] yes  [] no      [] moist mucosa    [] dry mucosa    hearing intact to voice [] yes  [] No                 NECK:   supple [x] yes  [] no        masses [] yes  [] No               thyroid  []  non tender  []  tender    RESP:   [x] CTA bilaterally/no wheezing/rhonchi/rales/crackles    [] rhonchi bilaterally - no dullness  [] wheezing   [] rhonchi   [] crackles     use of accessory muscles [] yes [x] no    CARD:   [x]  regular rate and rhythm/No murmurs/rubs/gallops    murmur  [] yes ()  [] no      Rubs  [] yes  [] no       Gallops [] yes  [] no    Rate []  regular  []  irregular        carotid bruits  [] Right  []  Left                 LE edema [] yes  [x] no           JVP  []  yes   [x]  no    ABD:    [x] soft/non distended/non tender/+bowel sounds/no HSM    []  Rigid    tenderness [] yes [] no   Liver enlargement  []  yes []  no                Spleen enlargement  []  yes []  no     distended []  yes [] no     bowel sound  [] hypoactive   [] hyperactive    LYMPH:    Neck []  yes [x]  no       Axillae []  yes []  no    SKIN:   Rashes []  yes   [x]  no    Ulcers []  yes   []  no               [] tight to palpitation    skin turgor []  good  [] poor  [] decreased               Cyanosis/clubbing []  yes []  no    NEUR:   [x] cranial nerves II-XII grossly intact       [] Cranial nerves deficit                 []  facial droop    []  slurred speech   [] aphasic     [] Strength normal     []  weakness  []  LUE  []   RUE/ []  LLE  []   RLE    follows commands  [x]  yes []  no           PSYCH:   insight [] poor [] good   Alert and Oriented to  [x] person  [x] place  [x]  time                    [] depressed [] anxious [] agitated  [] lethargic [] stuporous  [] sedated     LAB DATA REVIEWED:    Recent Labs      07/09/17 0953   WBC  5.6   HGB  12.9   HCT  38.6   PLT  160     Recent Labs      07/09/17 0953   NA  133*   K  3.7   CL  93*   CO2  22   BUN  34*   CREA  8.58*   GLU  100   CA  8.7   MG  2.3     Recent Labs      07/09/17 0953   SGOT  42*   ALT  47   AP  53   TBILI  1.9*   ALB  4.2   GLOB  3.8   LPSE  267     No results for input(s): INR, PTP, APTT in the last 72 hours. No lab exists for component: INREXT   No results for input(s): FE, TIBC, PSAT, FERR in the last 72 hours. No results for input(s): PH, PCO2, PO2 in the last 72 hours.   Recent Labs      07/09/17 0953   CPK  183     Lab Results   Component Value Date/Time Glucose (POC) 84 12/03/2015 09:34 AM       Procedures: see electronic medical records for all procedures/Xrays and details which were not copied into this note but were reviewed prior to creation of Plan.    ________________________________________________________________________       ___________________________________________________  Consulting Physician:  Rosa Rees MD

## 2017-07-09 NOTE — IP AVS SNAPSHOT
[address] [facility]. 
[phone] Patient: Xochilt Boyd MRN: [id number] You are allergic to the following Allergen Reactions Lisinopril Angioedema Recent Documentation Height Weight BMI Smoking Status 1.753 m 83.1 kg 27.05 kg/m2 Never Smoker Emergency Contacts Name Discharge Info Relation Home Work Mobile [address] CAREGIVER [3] Spouse [3] [phone] About your hospitalization You were admitted on:  July 9, 2017 You last received care in the:  [facility] 2 CRITICAL CARE 1 You were discharged on:  July 16, 2017 Unit phone number:  [phone] Why you were hospitalized Your primary diagnosis was:  Arf (Acute Renal Failure) (Hcc) Your diagnoses also included:  Chronic Alcoholism (Hcc), Htn (Hypertension), Hepatic Steatosis, History Of Cva (Cerebrovascular Accident), Uti (Urinary Tract Infection) Providers Seen During Your Hospitalizations Provider Role Specialty Primary office phone Og Sarmiento MD Attending Provider Emergency Medicine [phone] Richard Richmond MD Attending Provider Internal Medicine [phone] Timo Vargas MD Attending Provider Internal Medicine [phone] Marisol Lutz MD Attending Provider Hospitalist [phone] Your Primary Care Physician (PCP) Primary Care Physician Office Phone Office Fax Maged JAMES [phone] Follow-up Information Follow up With Details Comments Contact Info MD Juanjose De Paz  [address] [facility] [address] [phone] 
[phone] Current Discharge Medication List  
  
START taking these medications Dose & Instructions Dispensing Information Comments Morning Noon Evening Bedtime  
 aspirin 81 mg chewable tablet Your last dose was: Your next dose is:    
   
   
 Dose:  81 mg Take 1 Tab by mouth daily. Quantity:  30 Tab Refills:  1  
     
   
   
   
  
 febuxostat 40 mg Tab tablet Commonly known as:  Harsh Wilder Your last dose was: Your next dose is:    
   
   
 Dose:  40 mg Take 1 Tab by mouth daily. Quantity:  30 Tab Refills:  1  
     
   
   
   
  
 folic acid 1 mg tablet Commonly known as:  Google Your last dose was: Your next dose is:    
   
   
 Dose:  1 mg Take 1 Tab by mouth daily. Quantity:  30 Tab Refills:  1  
     
   
   
   
  
 hydroCHLOROthiazide 12.5 mg tablet Commonly known as:  HYDRODIURIL Your last dose was: Your next dose is:    
   
   
 Dose:  12.5 mg Take 1 Tab by mouth daily. Quantity:  30 Tab Refills:  1 LORazepam 2 mg tablet Commonly known as:  ATIVAN Your last dose was: Your next dose is:    
   
   
 Dose:  2 mg Take 1 Tab by mouth Multiple. Take 1 tab po q6h  for 3 days then  Take 1 tab po q8h for 3 days then Take 1 tab po q12H for 3 days then Take 1 tab po daily for 3 days then stop Quantity:  30 Tab Refills:  0  
     
   
   
   
  
 magnesium oxide 400 mg tablet Commonly known as:  MAG-OX Your last dose was: Your next dose is:    
   
   
 Dose:  400 mg Take 1 Tab by mouth two (2) times a day. Quantity:  60 Tab Refills:  1  
     
   
   
   
  
 thiamine 100 mg tablet Commonly known as:  B-1 Your last dose was: Your next dose is:    
   
   
 Dose:  100 mg Take 1 Tab by mouth daily. Quantity:  30 Tab Refills:  1 CONTINUE these medications which have CHANGED Dose & Instructions Dispensing Information Comments Morning Noon Evening Bedtime  
 atenolol 50 mg tablet Commonly known as:  TENORMIN What changed:   
- medication strength 
- how much to take Your last dose was: Your next dose is:    
   
   
 Dose:  50 mg Take 1 Tab by mouth daily. Quantity:  30 Tab Refills:  1 STOP taking these medications   
 guaiFENesin  mg ER tablet Commonly known as:  MUCINEX  
   
  
 lidocaine 2 % solution Commonly known as:  LIDOCAINE VISCOUS  
   
  
 OTHER Where to Get Your Medications Information on where to get these meds will be given to you by the nurse or doctor. ! Ask your nurse or doctor about these medications  
  aspirin 81 mg chewable tablet  
 atenolol 50 mg tablet  
 febuxostat 40 mg Tab tablet  
 folic acid 1 mg tablet  
 hydroCHLOROthiazide 12.5 mg tablet LORazepam 2 mg tablet  
 magnesium oxide 400 mg tablet  
 thiamine 100 mg tablet Discharge Instructions HOSPITALIST DISCHARGE INSTRUCTIONS 
NAME: Stormy Gregg :  1955 MRN:  [id number] Date/Time:  2017 2:55 PM 
 
ADMIT DATE: 2017 DISCHARGE DATE: 2017 DIAGNOSIS:   ARF, Alcohol Withdrawal, Hepatic Steatosis, HTN, H/O CVA, Hyponatremia MEDICATIONS: 
  
 
         As per medication reconciliation · It is important that you take the medication exactly as they are prescribed. · Keep your medication in the bottles provided by the pharmacist and keep a list of the medication names, dosages, and times to be taken in your wallet. · Do not take other medications without consulting your doctor. Pain Management: per above medications What to do at [facility] Recommended diet:  Cardiac Diet Recommended activity: Activity as tolerated and No driving If you experience any of the following symptoms then please call your primary care physician or return to the emergency room if you cannot get hold of your doctor: 
Fever, chills, nausea, vomiting, diarrhea, change in mentation, falling, bleeding, shortness of breath.  
 
Follow Up: 
 PCP you are to call and set up an appointment to see them in 2 week. Information obtained by : 
I understand that if any problems occur once I am at home I am to contact my physician. I understand and acknowledge receipt of the instructions indicated above. Physician's or R.N.'s Signature                                                                  Date/Time Patient or Representative Signature                                                          Date/Time Discharge Orders None [employer] Announcement We are excited to announce that we are making your provider's discharge notes available to you in [employer]. You will see these notes when they are completed and signed by the physician that discharged you from your recent hospital stay. If you have any questions or concerns about any information you see in Vantrix, please call the Health Information Department where you were seen or reach out to your Primary Care Provider for more information about your plan of care. Introducing [facility]! Baron Zapata introduces Vantrix patient portal. Now you can access parts of your medical record, email your doctor's office, and request medication refills online. 1. In your internet browser, go to [email or web address] 2. Click on the First Time User? Click Here link in the Sign In box. You will see the New Member Sign Up page. 3. Enter your Vantrix Access Code exactly as it appears below. You will not need to use this code after youve completed the sign-up process. If you do not sign up before the expiration date, you must request a new code.  
 
· Vantrix Access Code: [email or web address] 
 Expires: 10/7/2017  9:46 AM 
 
4. Enter the last four digits of your Social Security Number (xxxx) and Date of Birth (mm/dd/yyyy) as indicated and click Submit. You will be taken to the next sign-up page. 5. Create a [employer] ID. This will be your [employer] login ID and cannot be changed, so think of one that is secure and easy to remember. 6. Create a [employer] password. You can change your password at any time. 7. Enter your Password Reset Question and Answer. This can be used at a later time if you forget your password. 8. Enter your e-mail address. You will receive e-mail notification when new information is available in [address] 9. Click Sign Up. You can now view and download portions of your medical record. 10. Click the Download Summary menu link to download a portable copy of your medical information. If you have questions, please visit the Frequently Asked Questions section of the [employer] website. Remember, [employer] is NOT to be used for urgent needs. For medical emergencies, dial 911. Now available from your iPhone and Android! General Information Please provide this summary of care documentation to your next provider. Patient Signature:  ____________________________________________________________ Date:  ____________________________________________________________  
  
[place] Provider Signature:  ____________________________________________________________ Date:  ____________________________________________________________

## 2017-07-09 NOTE — H&P
History and Physical          Pt Name  Stormy Gregg   Date of Birth 1955   Medical Record Number  [id number]      Age  58 y.o. PCP Shalini Downs MD   Admit date:  7/9/2017    Room Number  ER28/28  @ [facility]   Date of Service  7/9/2017     Admission Diagnoses:  ARF (acute renal failure) [facility])     Certification: We are admitting Stormy Gregg 58 y.o. male with a principle diagnosis of ARF (acute renal failure) ([facility] [address]  This patient also suffers from other comorbidities listed below. I have a high level of concern for rapid decline in hemodynamics  leading to life threatening complications      Assessment and plan:  ARF (acute renal failure) ([facility] [address] [address]  Rather dramatic rise in creatinine with minimal symptoms or history. Admit to CCU (for high risk of DT/Seizure)   IVF Generous   Avoid all nephrotoxins   Urine studies   Renal US pending results (no history of obstructive uropathy like symptoms)   Nephrology consultation   Watch electrolytes with daily BMP     UTI (urinary tract infection)  Preliminary U/A is highly suspicious for UTI   Started on Rocephin IV daily and we will continue that as is   Urine sent for culture     Chronic alcoholism ([facility]  Pt acknowledges drinking beer (half a case daily) and vodka (1 quart QOD) ; He takes librium when he is not drinking !! He has had ETOH withdrawal seizure in the past and given his amount of ETOH consumption and other clinical issues, we will admit him to CCU - he might need restrains physical and chemical (precedex)   Meantime we have started him on Ativan PO /IV upto nursing staff's choice on the basis of [place] protocol   IV Vitamins including thiamine and folate. Banana bag daily x 3     HTN (hypertension)  We will continue his home dose of Atenolol with hold parameters       Hepatic steatosis  Noted from his old US of liver.    We will repeat non-urgent US of liver tomorrow to determine the status of his liver     History of CVA (cerebrovascular accident)  Old CT head in 2015 showed lacunar infarct involving the basal ganglia. Pt was not aware of such finding. CODE STATUS  Full     Functional Status  Pt is  and lives with his wife in [place].   He is  Retired from [employer] services    Surrogate decision maker: Pt's wife Herschel Moritz [phone]    Prophylaxis  Hep SQ   Discharge Plan: Home w/Family,     There are currently no Active Isolations Payor: [facility] / Plan: [facility] PPO / Product Type: PPO /    Social issues  Date Comment    2:01 PM  I left a voicemail in Mrs. Clark's number asking her to call me back       Prognosis  Fair      Subjective Data         History of Present Illness : The pt was in [facility] last week and was fine. He took one day one dose of OTC advil while he was tired. His trip was completely uneventful. He came back to [place] and since then he had been binge drinking. He informed me that when he is not drinking he takes librium to prevent seizure. He felt unwell with slight sore throat, but went on drinking. This morning he woke up feeling unwell and he was brought to the ER, here his workup revealed ARF and other finding as above.      We were asked to admit him to hospital        Review of Systems - History obtained from the patient  General ROS: positive for  - fatigue  negative for - chills, fever, weight gain or weight loss  Psychological ROS: negative for - anxiety or behavioral disorder  Ophthalmic ROS: negative for - blurry vision or decreased vision  ENT ROS: negative for - epistaxis, headaches or hearing change  Allergy and Immunology ROS: negative for - nasal congestion, postnasal drip or seasonal allergies  Hematological and Lymphatic ROS: negative for - bleeding problems, blood clots or blood transfusions  Respiratory ROS: no cough, shortness of breath, or wheezing  Cardiovascular ROS: no chest pain or dyspnea on exertion  Gastrointestinal ROS: no abdominal pain, change in bowel habits, or black or bloody stools  Genito-Urinary ROS: no dysuria, trouble voiding, or hematuria  Musculoskeletal ROS: negative for - gait disturbance, joint pain or joint stiffness  Neurological ROS: negative for - behavioral changes, bowel and bladder control changes, confusion, dizziness or gait disturbance  Dermatological ROS: negative     Past Medical History:   Diagnosis Date    Alcoholism ([facility] [facility]     Hypertension     Ill-defined condition     Hernia     Seizures ([facility] [facility]     Withdrawal          Past Surgical History:   Procedure Laterality Date    HX HERNIA REPAIR           Social History   Substance Use Topics    Smoking status: Never Smoker    Smokeless tobacco: Never Used    Alcohol use Yes      Comment: 1/2-1 pint vodka/day +/- beer         Family History   Problem Relation Age of Onset    Hypertension Other     Breast Cancer Other            Objective data     Comment:  very pleasant gentleman lying in bed in no distress. Patient Vitals for the past 24 hrs:   Temp   07/09/17 0859 98 °F (36.7 °C)    Patient Vitals for the past 24 hrs:   Pulse   07/09/17 1100 86   07/09/17 1045 88   07/09/17 1015 88   07/09/17 1000 95   07/09/17 0945 (!) 101   07/09/17 0930 95   07/09/17 0915 (!) 105   07/09/17 0859 (!) 110    Patient Vitals for the past 24 hrs:   Resp   07/09/17 1100 26   07/09/17 1045 22   07/09/17 1015 27   07/09/17 1000 25   07/09/17 0945 24   07/09/17 0930 24   07/09/17 0915 25   07/09/17 0859 16    Patient Vitals for the past 24 hrs:   BP   07/09/17 1100 117/74   07/09/17 1000 131/77   07/09/17 0945 112/63   07/09/17 0930 113/74   07/09/17 0915 133/76   07/09/17 0859 126/78        SpO2 Readings from Last 6 Encounters:   07/09/17 99%   02/01/17 97%   01/25/17 95%   11/04/16 90%   09/23/16 97%   07/11/16 95%         O2 Device: Room air   Body mass index is 25.13 kg/(m^2).  - Wt Readings from Last 10 Encounters:   07/09/17 77.2 kg (170 lb 3.1 oz)   02/01/17 78.8 kg (173 lb 11.6 oz)   01/24/17 79.2 kg (174 lb 9.7 oz)   11/03/16 73.9 kg (163 lb)   09/23/16 81.3 kg (179 lb 3.7 oz)   07/11/16 83.4 kg (183 lb 13.8 oz)   12/08/15 71.4 kg (157 lb 6.5 oz)   07/30/14 79.7 kg (175 lb 11.3 oz)   12/20/13 78 kg (172 lb)   12/29/12 82.8 kg (182 lb 8.7 oz)          Physical Exam:             General:  Alert, cooperative,   well noursished,   well developed,   appears stated age    Ears/Eyes:  Hearing intact  Sclera anicteric. Conjunctivae injected   Pupils equal   Mouth/Throat:  Mucous membranes normal pink and moist  Fauces injected - no membrane noted   Oral pharynx clear    Neck:     Lungs:  Trachea midline  Chest excursion symmetrical   Auscultation B/L Symmetrical with   Vesicular breath sounds     No Crepitations noted   Percussion note resonant on mid Clavicular line; no sign of pneumothorax        CVS:  Regular rate and rhythm   no  murmur,   No click, rub or gallop  S1 normal   S2 normal   Pedal pulses  b/l symmetrical   Abdomen:    Soft, non-tender  Bowel sounds normal  No distension   Percussion note tympanitic   Extremities:    No cyanosis, jaundice  No edema noted   No sign of DVT/cord like lesion on palpation  No sign of acute trauma   Age appropriate OA changes noted. Skin:    Skin color, texture, turgor normal. no acute rash or lesions    Lymph nodes:     Musculoskeletal Muscle bulk B/L symmetrical   Neuro Cranial nerves are intact,   motor movement b/l symmetrical,   Sensory evaluation b/l symmetrical    Psych:  Alert and oriented, normal mood & affect given the clinical scenario          Medications reviewed     Current Facility-Administered Medications   Medication Dose Route Frequency    cefTRIAXone (ROCEPHIN) 1 g in 0.9% sodium chloride (MBP/ADV) 50 mL  1 g IntraVENous Q24H     Current Outpatient Prescriptions   Medication Sig    atenolol (TENORMIN) 25 mg tablet Take 25 mg by mouth daily.     lidocaine (LIDOCAINE VISCOUS) 2 % solution Take 15 mL by mouth three (3) times daily as needed for Pain.  guaiFENesin ER (MUCINEX) 600 mg ER tablet Take 1 Tab by mouth two (2) times daily as needed for Congestion.  OTHER Indications: pt. taking blood pressure medication but doesn't know the name of it       Relevant other informations: Other medical conditions listed in this following active hospital problem list section; all of these and other pertinent data were taken into consideration when treatment plan is developed and customized to this patient's unique overall circumstances and needs. We have reviewed available old medical records within the constraints of this admission process. Present on Admission:   Chronic alcoholism ([facility] [address]   ARF (acute renal failure) (HCC)   HTN (hypertension)   Hepatic steatosis   History of CVA (cerebrovascular accident)   UTI (urinary tract infection)       Data Review:   Recent Days:  All Micro Results     Procedure Component Value Units Date/Time    CULTURE, URINE [[id number]]     Order Status:  Sent Specimen:  Urine from Clean catch           Recent Labs      07/09/17   0953   WBC  5.6   HGB  12.9   HCT  38.6   PLT  160     Recent Labs      07/09/17   0953   NA  133*   K  3.7   CL  93*   CO2  22   GLU  100   BUN  34*   CREA  8.58*   CA  8.7   MG  2.3   ALB  4.2   TBILI  1.9*   SGOT  42*   ALT  47      Lab Results   Component Value Date/Time    TSH 2.64 12/04/2015 01:17 PM         Care Plan discussed with: Patient/Family and Nurse   Other medical conditions are listed in the active hospital problem list section; these and other pertinent data were taken into consideration when the treatment plan was developed and customized to this patient's unique overall circumstances and needs. High complexity decision making was performed for this patient who is at high risk for decompensation with multiple organ involvement.      Today total floor/unit time was 65  minutes critical care, while caring for this patient and greater than 50% of that time was spent with patient (and/or family) coordinating patients clinical issues.      Tiesha Bender MD MPH  FACP                               7/9/2017

## 2017-07-09 NOTE — ED NOTES
After given patient valium patient oxygen sat dropped to the 80's. Placed patient on 2 L NC at this time and patient is in the 90's and resting comfortable at this time.

## 2017-07-09 NOTE — ASSESSMENT & PLAN NOTE
Old CT head in 2015 showed lacunar infarct involving the basal ganglia. Pt was not aware of such finding.

## 2017-07-09 NOTE — ASSESSMENT & PLAN NOTE
Pt acknowledges drinking beer (half a case daily) and vodka (1 quart QOD) ; He takes librium when he is not drinking !! He has had ETOH withdrawal seizure in the past and given his amount of ETOH consumption and other clinical issues, we will admit him to CCU - he might need restrains physical and chemical (precedex)   Meantime we have started him on Ativan PO /IV upto nursing staff's choice on the basis of CIWA protocol   IV Vitamins including thiamine and folate.    Banana bag daily x 3

## 2017-07-09 NOTE — ED NOTES
Assumed care of pt. Pt. Placed in position of comfort. Call bell within reach. Pt. Made aware of care plan. Pt came in with alcohol withdraws and hypertension. Pt stated that he last drink was at 3 am. Pt stated that he normally drinks a pint a day.

## 2017-07-09 NOTE — PROGRESS NOTES
1525- Report received from [address] [place] [address]. 3482- Patient transported to CCU with CCU RN and monitor. 1550- Patient arrived to unit. Patient assessed. See flowsheet. Primary Nurse Mary Alberto RN and Samra Oleary RN performed a dual skin assessment on this patient. No impairment noted. Robinson score is 23. 1558- 2 mg of ativan given for CIWA of 13. 1605- CIWA 5 post ativan. [facility] given. [address] remains 10. Precedex started. 1930- Report to [facility] ER.

## 2017-07-09 NOTE — ASSESSMENT & PLAN NOTE
Rather dramatic rise in creatinine with minimal symptoms or history.    Admit to CCU (for high risk of DT/Seizure)   IVF Generous   Avoid all nephrotoxins   Urine studies   Renal US pending results (no history of obstructive uropathy like symptoms)   Nephrology consultation   Watch electrolytes with daily BMP

## 2017-07-09 NOTE — ED NOTES
Report received from Annie Groves. Assumed care of pt. Pt awaiting CCU to come down to ED to escort him to his inpatient room. Pt in no apparent distress and expresses no further needs or concerns at this time.

## 2017-07-09 NOTE — ASSESSMENT & PLAN NOTE
Noted from his old US of liver.    We will repeat non-urgent US of liver tomorrow to determine the status of his liver

## 2017-07-09 NOTE — ASSESSMENT & PLAN NOTE
Preliminary U/A is highly suspicious for UTI   Started on Rocephin IV daily and we will continue that as is   Urine sent for culture

## 2017-07-09 NOTE — Clinical Note
Status[de-identified] Inpatient [101] Type of Bed: Telemetry Remote [29] Inpatient Hospitalization Certified Necessary for the Following Reasons: 3. Patient receiving treatment that can only be provided in an inpatient setting (further clarification in H&P documentation) Admitting Diagnosis: ARF (acute renal failure) ([facility] [[id number]] Admitting Diagnosis: Chronic alcoholism ([facility] [[id number]] Admitting Physician: Ashely Teresa [[id number]] Attending Physician: Latha Doran, [address] [place] [address] Estimated Length of Stay: 3-4 Midnights Discharge Plan[de-identified] Home with Office Follow-up

## 2017-07-09 NOTE — ED PROVIDER NOTES
HPI Comments: Pranay Newsome is a 58 y.o. Male, with a pertinent PMHx of HTN, withdrawal related sz, hernia, and alcoholism, who presents ambulatory to the [facility] ED c/o alcohol withdrawal symptoms today. He notes associated symptoms of headache, diffuse body aches, shaking, nausea, abdominal pain, and sore throat. Pt additionally reports associated symptoms of cough and vomiting last night, but has resolved since. Pt reports his last alcoholic drink was at 3 AM this morning. He notes he usually has 1 pint of liquor and a beer a day. Pt reports he has been struggling with alcohol for 3 years and would like to stop. He reports he was able to stop and has experienced withdrawal in the past. He states he experiences alcohol related seizures secondary to the withdrawal. He reports he has a visible hernia currently, which is irritated secondary to the alcohol. He additionally notes a h/o gastritis secondary to alcohol. Pt states he has been to support groups at [facility] and plans to return to [place] [id number] in the future. Pt's wife reports the pt's brother was an alcoholic. He states he is compliant with his HTN medication. Pt's reports a FHx of HTN. Pt denies a h/o hypercholesteremia, intestinal bleeding, stomach ulcers, liver disease, pancreatitis, or gallstones. Pt specifically denies anxiety, agitation, skin crawling sensation, hallucination, fever, urinary discomfort, diarrhea, constipation, blood in stool, or vomiting blood. Social hx: - Tobacco use, + EtOH use, - Illicit drug use    PCP: Lisseth Bhandari MD    There are no other complaints, changes or physical findings at this time. The history is provided by the patient and the spouse. No  was used.         Past Medical History:   Diagnosis Date    Alcoholism ([address]     Hypertension     Ill-defined condition     Hernia     Seizures ([address]     Withdrawal        Past Surgical History:   Procedure Laterality Date    HX HERNIA REPAIR Family History:   Problem Relation Age of Onset    Hypertension Other     Breast Cancer Other        Social History     Social History    Marital status:      Spouse name: N/A    Number of children: N/A    Years of education: N/A     Occupational History    Not on file. Social History Main Topics    Smoking status: Never Smoker    Smokeless tobacco: Never Used    Alcohol use Yes      Comment: 1/2-1 pint vodka/day +/- beer    Drug use: No    Sexual activity: Not on file     Other Topics Concern    Not on file     Social History Narrative         ALLERGIES: Lisinopril    Review of Systems   Constitutional: Negative for fever. HENT: Positive for sore throat. Respiratory: Negative for cough (resolved). Gastrointestinal: Positive for abdominal pain and nausea. Negative for blood in stool, constipation, diarrhea and vomiting (resolved, -blood). Genitourinary: Negative for dysuria. Musculoskeletal: Positive for myalgias (+diffuse body aches). Neurological: Positive for tremors and headaches. Psychiatric/Behavioral: Negative for agitation and hallucinations. The patient is not nervous/anxious. -skin crawling sensation   All other systems reviewed and are negative. Patient Vitals for the past 12 hrs:   Temp Pulse Resp BP SpO2   07/09/17 1100 - 86 26 117/74 99 %   07/09/17 1045 - 88 22 - 99 %   07/09/17 1015 - 88 27 - 95 %   07/09/17 1000 - 95 25 131/77 98 %   07/09/17 0945 - (!) 101 24 112/63 97 %   07/09/17 0930 - 95 24 113/74 95 %   07/09/17 0915 - (!) 105 25 133/76 98 %   07/09/17 0859 98 °F (36.7 °C) (!) 110 16 126/78 100 %            Physical Exam     Vital signs and nursing notes reviewed    CONSTITUTIONAL: Alert, in moderate distress; well-developed; well-nourished. Not diaphoretic. HEAD:  Normocephalic, atraumatic  EYES: PERRL; EOM's intact. No icteric sclera. ENTM: Nose: no rhinorrhea; Throat: no erythema or exudate, mucous membranes moist  Neck:  Supple.  trachea is midline. RESP: Chest clear, equal breath sounds. - W/R/R  CV: S1 and S2 WNL; No murmurs, gallops or rubs. 2+ radial and DP pulses bilaterally. GI: non-distended, normal bowel sounds, abdomen soft with epigastric tenderness. No masses or organomegaly. No rebound or guarding. Soft reducible umbilical hernia. : No costo-vertebral angle tenderness. BACK:  Non-tender, normal appearance  UPPER EXT:  Normal inspection. no joint or soft tissue swelling. Slight hand tremor. LOWER EXT: No edema, no calf tenderness. NEURO: Alert and oriented x3, 5/5 strength and light touch sensation intact in bilateral upper and lower extremities. SKIN: No rashes; Warm and dry. No jaundiced skin. PSYCH: Normal mood, normal affect    MDM  Number of Diagnoses or Management Options  Acute renal failure, unspecified acute renal failure type [facility]):   Alcohol withdrawal, uncomplicated [facility]):   Diagnosis management comments: 57 y/o male with a long h/o alcoholism. Here with withdrawl sxs. Initial nurse CIWA score = 10 with tachycardia and tremor. Plan for initial sx control, hydration. Will evaluate for cardiac ischemia, infection, rhabdomyolysis. Amount and/or Complexity of Data Reviewed  Clinical lab tests: ordered and reviewed  Tests in the radiology section of CPT®: ordered and reviewed  Tests in the medicine section of CPT®: ordered and reviewed  Obtain history from someone other than the patient: yes (Wife)  Review and summarize past medical records: yes  Discuss the patient with other providers: yes (Hospitalist)  Independent visualization of images, tracings, or specimens: yes    Patient Progress  Patient progress: stable    ED Course       Procedures    PROGRESS NOTE:  9:02 AM  EK:02 AM Sinus tachycardia. Normal axis. Normal interval. Mild artifact. No ST wave or T-wave inversion.     CONSULT NOTE:   12:36 PM  Dario Jackson MD spoke with Dr. Kvng Busch,   Specialty: Hospitalist  Discussed pt's hx, disposition, and available diagnostic and imaging results. Reviewed care plans. Consultant will evaluate pt for admission. Written by Pranay Nielsen ED Scribjerod, as dictated by Sekou Ray MD.    LABORATORY TESTS:  Recent Results (from the past 12 hour(s))   EKG, 12 LEAD, INITIAL    Collection Time: 07/09/17  9:08 AM   Result Value Ref Range    Ventricular Rate 102 BPM    Atrial Rate 102 BPM    P-R Interval 146 ms    QRS Duration 90 ms    Q-T Interval 336 ms    QTC Calculation (Bezet) 437 ms    Calculated P Axis 55 degrees    Calculated R Axis 27 degrees    Calculated T Axis 4 degrees    Diagnosis       Sinus tachycardia  Nonspecific ST abnormality  Abnormal ECG  When compared with ECG of 01-FEB-2017 16:21,  No significant change was found     CBC WITH AUTOMATED DIFF    Collection Time: 07/09/17  9:53 AM   Result Value Ref Range    WBC 5.6 4.1 - 11.1 K/uL    RBC 3.79 (L) 4.10 - 5.70 M/uL    HGB 12.9 12.1 - 17.0 g/dL    HCT 38.6 36.6 - 50.3 %    .8 (H) 80.0 - 99.0 FL    MCH 34.0 26.0 - 34.0 PG    MCHC 33.4 30.0 - 36.5 g/dL    RDW 13.7 11.5 - 14.5 %    PLATELET 412 608 - 540 K/uL    NEUTROPHILS 80 (H) 32 - 75 %    LYMPHOCYTES 8 (L) 12 - 49 %    MONOCYTES 12 5 - 13 %    EOSINOPHILS 0 0 - 7 %    BASOPHILS 0 0 - 1 %    ABS. NEUTROPHILS 4.5 1.8 - 8.0 K/UL    ABS. LYMPHOCYTES 0.4 (L) 0.8 - 3.5 K/UL    ABS. MONOCYTES 0.7 0.0 - 1.0 K/UL    ABS. EOSINOPHILS 0.0 0.0 - 0.4 K/UL    ABS.  BASOPHILS 0.0 0.0 - 0.1 K/UL    RBC COMMENTS MACROCYTOSIS  SLIGHTLY        DF SMEAR SCANNED     METABOLIC PANEL, COMPREHENSIVE    Collection Time: 07/09/17  9:53 AM   Result Value Ref Range    Sodium 133 (L) 136 - 145 mmol/L    Potassium 3.7 3.5 - 5.1 mmol/L    Chloride 93 (L) 97 - 108 mmol/L    CO2 22 21 - 32 mmol/L    Anion gap 18 (H) 5 - 15 mmol/L    Glucose 100 65 - 100 mg/dL    BUN 34 (H) 6 - 20 MG/DL    Creatinine 8.58 (H) 0.70 - 1.30 MG/DL    BUN/Creatinine ratio 4 (L) 12 - 20      GFR est AA 8 (L) >60 ml/min/1.73m2    GFR est non-AA 6 (L) >60 ml/min/1.73m2    Calcium 8.7 8.5 - 10.1 MG/DL    Bilirubin, total 1.9 (H) 0.2 - 1.0 MG/DL    ALT (SGPT) 47 12 - 78 U/L    AST (SGOT) 42 (H) 15 - 37 U/L    Alk. phosphatase 53 45 - 117 U/L    Protein, total 8.0 6.4 - 8.2 g/dL    Albumin 4.2 3.5 - 5.0 g/dL    Globulin 3.8 2.0 - 4.0 g/dL    A-G Ratio 1.1 1.1 - 2.2     MAGNESIUM    Collection Time: 07/09/17  9:53 AM   Result Value Ref Range    Magnesium 2.3 1.6 - 2.4 mg/dL   CK    Collection Time: 07/09/17  9:53 AM   Result Value Ref Range     39 - 308 U/L   LIPASE    Collection Time: 07/09/17  9:53 AM   Result Value Ref Range    Lipase 267 73 - 393 U/L   TROPONIN I    Collection Time: 07/09/17  9:53 AM   Result Value Ref Range    Troponin-I, Qt. <0.04 <0.05 ng/mL   ETHYL ALCOHOL    Collection Time: 07/09/17  9:53 AM   Result Value Ref Range    ALCOHOL(ETHYL),SERUM 161 (H) <10 MG/DL   URINALYSIS W/ RFLX MICROSCOPIC    Collection Time: 07/09/17 11:55 AM   Result Value Ref Range    Color DARK YELLOW      Appearance TURBID (A) CLEAR      Specific gravity 1.020 1.003 - 1.030      pH (UA) 5.5 5.0 - 8.0      Protein 300 (A) NEG mg/dL    Glucose NEGATIVE  NEG mg/dL    Ketone TRACE (A) NEG mg/dL    Blood SMALL (A) NEG      Urobilinogen 0.2 0.2 - 1.0 EU/dL    Nitrites NEGATIVE  NEG      Leukocyte Esterase LARGE (A) NEG      WBC 10-20 0 - 4 /hpf    RBC 0-5 0 - 5 /hpf    Epithelial cells FEW FEW /lpf    Bacteria 1+ (A) NEG /hpf    Mucus TRACE (A) NEG /lpf   BILIRUBIN, CONFIRM    Collection Time: 07/09/17 11:55 AM   Result Value Ref Range    Bilirubin UA, confirm NEGATIVE  NEG         IMAGING RESULTS:  XR CHEST PA LAT   Final Result      US RETROPERITONEUM COMP    (Results Pending)       CXR Results  (Last 48 hours)               07/09/17 1035  XR CHEST PA LAT Final result    Impression:  IMPRESSION: No acute disease. No significant interval change. Narrative:  INDICATION: acute cough; eval for infiltrate       EXAM: CXR 2 Views. COMPARISON: 1/24/2017. FINDINGS: Frontal and lateral views of the chest show the lungs are free of   acute disease. Heart size is normal. There is no overt pulmonary edema. There is   no evident pneumothorax, adenopathy or pleural effusion. MEDICATIONS GIVEN:  Medications   famotidine (PF) (PEPCID) injection 20 mg (20 mg IntraVENous Given 7/9/17 0954)   diazePAM (VALIUM) injection 5 mg (5 mg IntraVENous Given 7/9/17 0954)   ondansetron (ZOFRAN) injection 4 mg (4 mg IntraVENous Given 7/9/17 0954)   sodium chloride 0.9 % bolus infusion 2,000 mL (2,000 mL IntraVENous New Bag 7/9/17 0954)   mylanta/viscous lidocaine (FERMIN)(GI COCKTAIL) (40 mL Oral Given 7/9/17 1154)       IMPRESSION:  1. Acute renal failure, unspecified acute renal failure type ([facility] [address]    2. Alcohol withdrawal, uncomplicated ([facility] [facility]        PLAN:  1. Admit    ADMIT NOTE:  12:36 PM  Patient is being admitted to the hospital by Dr. Krystian Delgado. The results of their tests and reasons for their admission have been discussed with them and/or available family. They convey agreement and understanding for the need to be admitted and for their admission diagnosis. Consultation has been made with the inpatient physician specialist for hospitalization. ATTESTATION:  This note is prepared by Ina Kapoor, acting as Scribe for Marlana Seip, MD.    Marlana Seip, MD: The scribe's documentation has been prepared under my direction and personally reviewed by me in its entirety. I confirm that the note above accurately reflects all work, treatment, procedures, and medical decision making performed by me.

## 2017-07-09 NOTE — IP AVS SNAPSHOT
Current Discharge Medication List  
  
START taking these medications Dose & Instructions Dispensing Information Comments Morning Noon Evening Bedtime  
 aspirin 81 mg chewable tablet Your last dose was: Your next dose is:    
   
   
 Dose:  81 mg Take 1 Tab by mouth daily. Quantity:  30 Tab Refills:  1  
     
   
   
   
  
 febuxostat 40 mg Tab tablet Commonly known as:  Kuldip Moise Your last dose was: Your next dose is:    
   
   
 Dose:  40 mg Take 1 Tab by mouth daily. Quantity:  30 Tab Refills:  1  
     
   
   
   
  
 folic acid 1 mg tablet Commonly known as:  [employer] Your last dose was: Your next dose is:    
   
   
 Dose:  1 mg Take 1 Tab by mouth daily. Quantity:  30 Tab Refills:  1  
     
   
   
   
  
 hydroCHLOROthiazide 12.5 mg tablet Commonly known as:  HYDRODIURIL Your last dose was: Your next dose is:    
   
   
 Dose:  12.5 mg Take 1 Tab by mouth daily. Quantity:  30 Tab Refills:  1 LORazepam 2 mg tablet Commonly known as:  ATIVAN Your last dose was: Your next dose is:    
   
   
 Dose:  2 mg Take 1 Tab by mouth Multiple. Take 1 tab po q6h  for 3 days then  Take 1 tab po q8h for 3 days then Take 1 tab po q12H for 3 days then Take 1 tab po daily for 3 days then stop Quantity:  30 Tab Refills:  0  
     
   
   
   
  
 magnesium oxide 400 mg tablet Commonly known as:  MAG-OX Your last dose was: Your next dose is:    
   
   
 Dose:  400 mg Take 1 Tab by mouth two (2) times a day. Quantity:  60 Tab Refills:  1  
     
   
   
   
  
 thiamine 100 mg tablet Commonly known as:  B-1 Your last dose was: Your next dose is:    
   
   
 Dose:  100 mg Take 1 Tab by mouth daily. Quantity:  30 Tab Refills:  1 CONTINUE these medications which have CHANGED Dose & Instructions Dispensing Information Comments Morning Noon Evening Bedtime  
 atenolol 50 mg tablet Commonly known as:  TENORMIN What changed:   
- medication strength 
- how much to take Your last dose was: Your next dose is:    
   
   
 Dose:  50 mg Take 1 Tab by mouth daily. Quantity:  30 Tab Refills:  1 STOP taking these medications   
 guaiFENesin  mg ER tablet Commonly known as:  MUCINEX  
   
  
 lidocaine 2 % solution Commonly known as:  LIDOCAINE VISCOUS  
   
  
 OTHER Where to Get Your Medications Information on where to get these meds will be given to you by the nurse or doctor. ! Ask your nurse or doctor about these medications  
  aspirin 81 mg chewable tablet  
 atenolol 50 mg tablet  
 febuxostat 40 mg Tab tablet  
 folic acid 1 mg tablet  
 hydroCHLOROthiazide 12.5 mg tablet LORazepam 2 mg tablet  
 magnesium oxide 400 mg tablet  
 thiamine 100 mg tablet

## 2017-07-10 ENCOUNTER — APPOINTMENT (OUTPATIENT)
Dept: ULTRASOUND IMAGING | Age: 62
DRG: 683 | End: 2017-07-10
Attending: INTERNAL MEDICINE
Payer: COMMERCIAL

## 2017-07-10 LAB
ALBUMIN SERPL BCP-MCNC: 3.1 G/DL (ref 3.5–5)
ALBUMIN/GLOB SERPL: 0.9 {RATIO} (ref 1.1–2.2)
ALP SERPL-CCNC: 40 U/L (ref 45–117)
ALT SERPL-CCNC: 30 U/L (ref 12–78)
AMMONIA PLAS-SCNC: 72 UMOL/L
ANION GAP BLD CALC-SCNC: 11 MMOL/L (ref 5–15)
AST SERPL W P-5'-P-CCNC: 27 U/L (ref 15–37)
ATRIAL RATE: 102 BPM
BASOPHILS # BLD AUTO: 0 K/UL (ref 0–0.1)
BASOPHILS # BLD: 1 % (ref 0–1)
BILIRUB SERPL-MCNC: 1.8 MG/DL (ref 0.2–1)
BUN SERPL-MCNC: 39 MG/DL (ref 6–20)
BUN/CREAT SERPL: 4 (ref 12–20)
CALCIUM SERPL-MCNC: 7.2 MG/DL (ref 8.5–10.1)
CALCULATED P AXIS, ECG09: 55 DEGREES
CALCULATED R AXIS, ECG10: 27 DEGREES
CALCULATED T AXIS, ECG11: 4 DEGREES
CHLORIDE SERPL-SCNC: 97 MMOL/L (ref 97–108)
CO2 SERPL-SCNC: 23 MMOL/L (ref 21–32)
CREAT SERPL-MCNC: 9.19 MG/DL (ref 0.7–1.3)
DIAGNOSIS, 93000: NORMAL
EOSINOPHIL # BLD: 0 K/UL (ref 0–0.4)
EOSINOPHIL #/AREA URNS HPF: NEGATIVE /[HPF]
EOSINOPHIL NFR BLD: 1 % (ref 0–7)
ERYTHROCYTE [DISTWIDTH] IN BLOOD BY AUTOMATED COUNT: 14 % (ref 11.5–14.5)
GLOBULIN SER CALC-MCNC: 3.3 G/DL (ref 2–4)
GLUCOSE SERPL-MCNC: 119 MG/DL (ref 65–100)
HCT VFR BLD AUTO: 29.9 % (ref 36.6–50.3)
HGB BLD-MCNC: 10.1 G/DL (ref 12.1–17)
LIPASE SERPL-CCNC: 362 U/L (ref 73–393)
LYMPHOCYTES # BLD AUTO: 20 % (ref 12–49)
LYMPHOCYTES # BLD: 0.6 K/UL (ref 0.8–3.5)
MAGNESIUM SERPL-MCNC: 2.3 MG/DL (ref 1.6–2.4)
MCH RBC QN AUTO: 34.1 PG (ref 26–34)
MCHC RBC AUTO-ENTMCNC: 33.8 G/DL (ref 30–36.5)
MCV RBC AUTO: 101 FL (ref 80–99)
MONOCYTES # BLD: 0.5 K/UL (ref 0–1)
MONOCYTES NFR BLD AUTO: 18 % (ref 5–13)
NEUTS SEG # BLD: 1.8 K/UL (ref 1.8–8)
NEUTS SEG NFR BLD AUTO: 60 % (ref 32–75)
P-R INTERVAL, ECG05: 146 MS
PHOSPHATE SERPL-MCNC: 5 MG/DL (ref 2.6–4.7)
PLATELET # BLD AUTO: 160 K/UL (ref 150–400)
POTASSIUM SERPL-SCNC: 3.7 MMOL/L (ref 3.5–5.1)
PROT SERPL-MCNC: 6.4 G/DL (ref 6.4–8.2)
Q-T INTERVAL, ECG07: 336 MS
QRS DURATION, ECG06: 90 MS
QTC CALCULATION (BEZET), ECG08: 437 MS
RBC # BLD AUTO: 2.96 M/UL (ref 4.1–5.7)
SODIUM SERPL-SCNC: 131 MMOL/L (ref 136–145)
VENTRICULAR RATE, ECG03: 102 BPM
WBC # BLD AUTO: 2.9 K/UL (ref 4.1–11.1)

## 2017-07-10 PROCEDURE — 74011250636 HC RX REV CODE- 250/636: Performed by: EMERGENCY MEDICINE

## 2017-07-10 PROCEDURE — 74011250636 HC RX REV CODE- 250/636: Performed by: INTERNAL MEDICINE

## 2017-07-10 PROCEDURE — 65660000000 HC RM CCU STEPDOWN

## 2017-07-10 PROCEDURE — 74011000258 HC RX REV CODE- 258: Performed by: INTERNAL MEDICINE

## 2017-07-10 PROCEDURE — 84100 ASSAY OF PHOSPHORUS: CPT | Performed by: INTERNAL MEDICINE

## 2017-07-10 PROCEDURE — 80053 COMPREHEN METABOLIC PANEL: CPT | Performed by: INTERNAL MEDICINE

## 2017-07-10 PROCEDURE — 85025 COMPLETE CBC W/AUTO DIFF WBC: CPT | Performed by: INTERNAL MEDICINE

## 2017-07-10 PROCEDURE — 74011250637 HC RX REV CODE- 250/637: Performed by: INTERNAL MEDICINE

## 2017-07-10 PROCEDURE — 82140 ASSAY OF AMMONIA: CPT | Performed by: INTERNAL MEDICINE

## 2017-07-10 PROCEDURE — 83690 ASSAY OF LIPASE: CPT | Performed by: INTERNAL MEDICINE

## 2017-07-10 PROCEDURE — 65270000029 HC RM PRIVATE

## 2017-07-10 PROCEDURE — 36415 COLL VENOUS BLD VENIPUNCTURE: CPT | Performed by: INTERNAL MEDICINE

## 2017-07-10 PROCEDURE — 83735 ASSAY OF MAGNESIUM: CPT | Performed by: INTERNAL MEDICINE

## 2017-07-10 PROCEDURE — 74011000250 HC RX REV CODE- 250: Performed by: INTERNAL MEDICINE

## 2017-07-10 PROCEDURE — 76705 ECHO EXAM OF ABDOMEN: CPT

## 2017-07-10 PROCEDURE — 74011000258 HC RX REV CODE- 258: Performed by: EMERGENCY MEDICINE

## 2017-07-10 RX ORDER — DEXTROSE MONOHYDRATE AND SODIUM CHLORIDE 5; .9 G/100ML; G/100ML
125 INJECTION, SOLUTION INTRAVENOUS CONTINUOUS
Status: DISCONTINUED | OUTPATIENT
Start: 2017-07-10 | End: 2017-07-10

## 2017-07-10 RX ORDER — LORAZEPAM 2 MG/ML
2 INJECTION INTRAMUSCULAR EVERY 8 HOURS
Status: DISCONTINUED | OUTPATIENT
Start: 2017-07-10 | End: 2017-07-14

## 2017-07-10 RX ORDER — HEPARIN SODIUM 5000 [USP'U]/ML
5000 INJECTION, SOLUTION INTRAVENOUS; SUBCUTANEOUS EVERY 12 HOURS
Status: DISCONTINUED | OUTPATIENT
Start: 2017-07-10 | End: 2017-07-10

## 2017-07-10 RX ORDER — MUPIROCIN CALCIUM 20 MG/G
CREAM TOPICAL 2 TIMES DAILY
Status: DISCONTINUED | OUTPATIENT
Start: 2017-07-10 | End: 2017-07-10

## 2017-07-10 RX ORDER — THERA TABS 400 MCG
1 TAB ORAL DAILY
Status: DISCONTINUED | OUTPATIENT
Start: 2017-07-10 | End: 2017-07-16 | Stop reason: HOSPADM

## 2017-07-10 RX ORDER — SODIUM CHLORIDE 9 MG/ML
125 INJECTION, SOLUTION INTRAVENOUS CONTINUOUS
Status: DISCONTINUED | OUTPATIENT
Start: 2017-07-10 | End: 2017-07-11

## 2017-07-10 RX ORDER — MUPIROCIN 20 MG/G
OINTMENT TOPICAL 2 TIMES DAILY
Status: COMPLETED | OUTPATIENT
Start: 2017-07-10 | End: 2017-07-14

## 2017-07-10 RX ORDER — LORAZEPAM 2 MG/ML
2 INJECTION INTRAMUSCULAR EVERY 6 HOURS
Status: DISCONTINUED | OUTPATIENT
Start: 2017-07-10 | End: 2017-07-10

## 2017-07-10 RX ADMIN — Medication 100 MG: at 08:39

## 2017-07-10 RX ADMIN — Medication 10 ML: at 00:05

## 2017-07-10 RX ADMIN — HEPARIN SODIUM 5000 UNITS: 5000 INJECTION, SOLUTION INTRAVENOUS; SUBCUTANEOUS at 13:35

## 2017-07-10 RX ADMIN — SODIUM CHLORIDE 0.4 MCG/KG/HR: 900 INJECTION, SOLUTION INTRAVENOUS at 18:03

## 2017-07-10 RX ADMIN — SODIUM CHLORIDE 500 ML: 900 INJECTION, SOLUTION INTRAVENOUS at 05:53

## 2017-07-10 RX ADMIN — SODIUM CHLORIDE 125 ML/HR: 900 INJECTION, SOLUTION INTRAVENOUS at 21:23

## 2017-07-10 RX ADMIN — THERA TABS 1 TABLET: TAB at 13:32

## 2017-07-10 RX ADMIN — LORAZEPAM 2 MG: 2 INJECTION INTRAMUSCULAR; INTRAVENOUS at 22:10

## 2017-07-10 RX ADMIN — SODIUM CHLORIDE 500 ML: 900 INJECTION, SOLUTION INTRAVENOUS at 01:13

## 2017-07-10 RX ADMIN — DEXTROSE MONOHYDRATE AND SODIUM CHLORIDE 125 ML/HR: 5; .45 INJECTION, SOLUTION INTRAVENOUS at 05:53

## 2017-07-10 RX ADMIN — SODIUM CHLORIDE 125 ML/HR: 900 INJECTION, SOLUTION INTRAVENOUS at 13:13

## 2017-07-10 RX ADMIN — Medication 10 ML: at 21:42

## 2017-07-10 RX ADMIN — MUPIROCIN: 20 OINTMENT TOPICAL at 08:39

## 2017-07-10 RX ADMIN — Medication 10 ML: at 05:53

## 2017-07-10 RX ADMIN — DEXTROSE MONOHYDRATE AND SODIUM CHLORIDE 125 ML/HR: 5; .9 INJECTION, SOLUTION INTRAVENOUS at 08:44

## 2017-07-10 RX ADMIN — Medication 10 ML: at 17:02

## 2017-07-10 RX ADMIN — HEPARIN SODIUM 5000 UNITS: 5000 INJECTION, SOLUTION INTRAVENOUS; SUBCUTANEOUS at 00:25

## 2017-07-10 RX ADMIN — CEFTRIAXONE 1 G: 1 INJECTION, POWDER, FOR SOLUTION INTRAMUSCULAR; INTRAVENOUS at 13:31

## 2017-07-10 RX ADMIN — LORAZEPAM 2 MG: 2 INJECTION INTRAMUSCULAR; INTRAVENOUS at 17:02

## 2017-07-10 RX ADMIN — LORAZEPAM 2 MG: 2 INJECTION INTRAMUSCULAR; INTRAVENOUS at 13:12

## 2017-07-10 RX ADMIN — Medication 10 ML: at 13:35

## 2017-07-10 RX ADMIN — MUPIROCIN: 20 OINTMENT TOPICAL at 17:03

## 2017-07-10 RX ADMIN — FOLIC ACID 1 MG: 1 TABLET ORAL at 08:39

## 2017-07-10 NOTE — PROGRESS NOTES
1900- Bedside and verbal report received from [facility]. 1930- Assessment complete. Pt resting in bed, oriented x4. No complaints or signs of distress. VSS. Lungs clear on room air. Abd soft and round, active bowel sounds. 2100- Pt sitting up in bed, \"thought I was at home\". Reoriented and repositioned in bed. Bed alarm placed. Instructed pt to call for assist.   0000- Pt sitting up in bed, confused and arguing with RN regarding hospitalization. Pt reoriented and explained plan of care. Precedex drip increased to 0.4  0430- Pt sitting up in bed, wanting clothes on to leave. Pt arguing over plan of care with staff and refusing to answer orientation questions. Reoriented pt and repositioned in bed. Assisted pt with putting shorts on. Pt now resting back in bed.   0720- Bedside and verbal report given to [facility] AND [facility].

## 2017-07-10 NOTE — PROGRESS NOTES
Shift summary  1900 to 720 am  Recd patient drowsy easily aroused, no pain, sbp low 75. Dr Kristopher Cobos hospitalist called and aware 500 ml NSS bolus given with moderate response bp 80. Hospitalist called back and no new order made. CIWA 2 to 3 . Calm and cooprerative. Assist patient side of bed for am care and tolerated. SR on monitor.   Bedside shift report given to [facility] incoming [facility]

## 2017-07-10 NOTE — PROGRESS NOTES
7/10/2017  9:02 AM    INTENSIVIST PROGRESS NOTE:     Patient seen and evaluated   59 yo male admitted with DT's and acute renal failure  Started on precedex drip  Resting comfortable  No acute complaints   No agitation     Visit Vitals    BP 91/63    Pulse 73    Temp 97.8 °F (36.6 °C)    Resp 22    Ht 5' 9\" (1.753 m)    Wt 78.6 kg (173 lb 4.5 oz)    SpO2 92%    BMI 25.59 kg/m2       General: no distress  CV: RRR  Lungs: clear    CXR: clear    Labs reviewed    A/P:  Wean off precedex  Ativan scheduled and prn  Renal fx worse  Renal following  Will assist on disposition planning when stable for transfer  Ramirez Cadena MD

## 2017-07-10 NOTE — CONSULTS
Progress Note    NAME: Radha Girard   :  1955   MRN:  [id number]     Date/Time:  7/10/2017 PM       Assessment :    Plan:  CLAUDIA  Mild hyponatremia  + AG  Etoh use  Etoh liver dz  HTN   Creatinine 0.81 in . Now up from 8.6 tp 9.1    Urine with protein and blood since 4130    Certainly a component of dehydration. If creatinine fails to improve would want to send serologic w/u. Continue with volume resuscitation. Changed IVF to NS due to hyponatremia  Pt understands he may need hd  Bladder scan       Subjective:   CHIEF COMPLAINT: claudia      Past Medical History:   Diagnosis Date    Alcoholism ([facility] [address]     Hypertension     Ill-defined condition     Hernia     Seizures ([facility])     Withdrawal       Past Surgical History:   Procedure Laterality Date    HX HERNIA REPAIR       Social History   Substance Use Topics    Smoking status: Never Smoker    Smokeless tobacco: Never Used    Alcohol use Yes      Comment: - pint vodka/day +/- beer      Family History   Problem Relation Age of Onset    Hypertension Other     Breast Cancer Other       Allergies   Allergen Reactions    Lisinopril Angioedema      Prior to Admission medications    Medication Sig Start Date End Date Taking? Authorizing Provider   atenolol (TENORMIN) 25 mg tablet Take 25 mg by mouth daily. Yes Phys MD Anthony   lidocaine (LIDOCAINE VISCOUS) 2 % solution Take 15 mL by mouth three (3) times daily as needed for Pain. 17   Judah Burnette MD   guaiFENesin ER (MUCINEX) 600 mg ER tablet Take 1 Tab by mouth two (2) times daily as needed for Congestion.  17   Render MD Vasile   OTHER Indications: pt. taking blood pressure medication but doesn't know the name of it    Phys MD Anthony     REVIEW OF SYSTEMS:    Hd denies n/v/cp/sob    Objective:   VITALS:    Visit Vitals    /77    Pulse 77    Temp 97.8 °F (36.6 °C)    Resp 30    Ht 5' 9\" (1.753 m)    Wt 78.6 kg (173 lb 4.5 oz)    SpO2 95%    BMI 25.59 kg/m2 PHYSICAL EXAM:  Gen:  []  WD []  WN  [] cachectic [x]  thin []  obese []  disheveled             [x]  ill apearing  []   Critical  [x]   Chronic    [x]  No acute distress    HEENT:   [x] NC/AT/PERRLA/EOMI    [] pink conjunctivae      [] pale conjunctivae                  PERRL  [] yes  [] no      [] moist mucosa    [] dry mucosa    hearing intact to voice [] yes  [] No                 NECK:   supple [x] yes  [] no        masses [] yes  [] No               thyroid  []  non tender  []  tender    RESP:   [x] CTA bilaterally/no wheezing/rhonchi/rales/crackles    [] rhonchi bilaterally - no dullness  [] wheezing   [] rhonchi   [] crackles     use of accessory muscles [] yes [x] no    CARD:   [x]  regular rate and rhythm/No murmurs/rubs/gallops    murmur  [] yes ()  [] no      Rubs  [] yes  [] no       Gallops [] yes  [] no    Rate []  regular  []  irregular        carotid bruits  [] Right  []  Left                 LE edema [] yes  [x] no           JVP  []  yes   [x]  no    ABD:    [x] soft/non distended/non tender/+bowel sounds/no HSM, (+) hernia    []  Rigid    tenderness [] yes [] no   Liver enlargement  []  yes []  no                Spleen enlargement  []  yes []  no     distended []  yes [] no     bowel sound  [] hypoactive   [] hyperactive    LYMPH:    Neck []  yes []  no       Axillae []  yes []  no    SKIN:   Rashes []  yes   [x]  no    Ulcers []  yes   []  no               [] tight to palpitation    skin turgor []  good  [] poor  [] decreased               Cyanosis/clubbing []  yes []  no    NEUR:   [x] cranial nerves II-XII grossly intact       [] Cranial nerves deficit                 []  facial droop    []  slurred speech   [] aphasic     [] Strength normal     []  weakness  []  LUE  []   RUE/ []  LLE  []   RLE    follows commands  [x]  yes []  no           PSYCH:   insight [] poor [] good   Alert and Oriented to  [x] person  [x] place  [x]  time                    [] depressed [] anxious [] agitated  [] lethargic [] stuporous  [] sedated     LAB DATA REVIEWED:    Recent Labs      07/10/17   0401  07/09/17   0953   WBC  2.9*  5.6   HGB  10.1*  12.9   HCT  29.9*  38.6   PLT  160  160     Recent Labs      07/10/17   0401 07/09/17   1720  07/09/17   0953   NA  131*   --   133*   K  3.7   --   3.7   CL  97   --   93*   CO2  23   --   22   BUN  39*   --   34*   CREA  9.19*   --   8.58*   GLU  119*   --   100   CA  7.2*   --   8.7   MG  2.3  2.0  2.3   PHOS  5.0*   --    --      Recent Labs      07/10/17   0401  07/09/17   0953   SGOT  27  42*   ALT  30  47   AP  40*  53   TBILI  1.8*  1.9*   ALB  3.1*  4.2   GLOB  3.3  3.8   LPSE  362  267     No results for input(s): INR, PTP, APTT in the last 72 hours. No lab exists for component: INREXT, INREXT   No results for input(s): FE, TIBC, PSAT, FERR in the last 72 hours. No results for input(s): PH, PCO2, PO2 in the last 72 hours.   Recent Labs      07/09/17   0953   CPK  183     Lab Results   Component Value Date/Time    Glucose (POC) 84 12/03/2015 09:34 AM       Procedures: see electronic medical records for all procedures/Xrays and details which were not copied into this note but were reviewed prior to creation of Plan.    ________________________________________________________________________       ___________________________________________________  Consulting Physician: Radha Perdue MD

## 2017-07-10 NOTE — PROGRESS NOTES
Hospitalist Progress Note    NAME: Pranay Newsome   :  1955   MRN:  [id number]       Assessment / Plan:  Acute renal failure  Hyponatremia  -? Pre-renal  -renal US wnl  -cont' IVF  -hold nephrotoxic agents  -nephro following    UTI  -cont' rocephin  -f/u urine cx    Elevated Tbili  Hepatic steatosis in the setting of chronic alcoholism  -liver US pending  -follow labs    Alcohol abuse  -at risk for DT  -CIWA, off precedex  -folic acid, MVI, thiamine    HTN  -cont' atenolol    Hx of CVA  -old CT head in  showed lacunar infarct involving the basal ganglia. Pt was not aware of such finding.   -start ASA when renal fxn recovers        Body mass index is 25.59 kg/(m^2). Code status: Full  Prophylaxis: SCDs  Recommended Disposition: Home w/Family     Subjective:     Chief Complaint / Reason for Physician Visit  Pt seen at bedside. No acute complaints. Discussed with RN events overnight. Review of Systems:  Symptom Y/N Comments  Symptom Y/N Comments   Fever/Chills n   Chest Pain n    Poor Appetite    Edema     Cough    Abdominal Pain n    Sputum    Joint Pain     SOB/AMEZQUITA n   Pruritis/Rash     Nausea/vomit n   Tolerating PT/OT     Diarrhea    Tolerating Diet     Constipation    Other y tremulous     Could NOT obtain due to:      Objective:     VITALS:   Last 24hrs VS reviewed since prior progress note.  Most recent are:  Patient Vitals for the past 24 hrs:   Temp Pulse Resp BP SpO2   07/10/17 1400 - 82 28 116/88 98 %   07/10/17 1300 - 82 19 135/87 99 %   07/10/17 1200 98.1 °F (36.7 °C) 82 26 123/82 98 %   07/10/17 1100 - 81 28 120/77 97 %   07/10/17 1000 - 77 30 113/77 95 %   07/10/17 0900 - 70 16 (!) 87/64 94 %   07/10/17 0800 97.8 °F (36.6 °C) 73 22 91/63 92 %   07/10/17 0700 - 77 18 105/72 -   07/10/17 0600 - 74 23 96/63 -   07/10/17 0500 - 77 19 (!) 89/51 -   07/10/17 0400 98 °F (36.7 °C) 70 19 90/50 96 %   07/10/17 0300 - 72 19 (!) 89/57 96 %   07/10/17 0200 - 77 22 (!) 81/61 95 %   07/10/17 0100 - 78 20 (!) 87/58 94 %   07/10/17 0030 - 84 21 90/62 97 %   07/10/17 0005 98.1 °F (36.7 °C) 85 22 - 94 %   07/10/17 0000 - 82 (!) 31 (!) 81/53 94 %   07/09/17 2330 - 82 20 (!) 83/52 95 %   07/09/17 2300 - 83 20 - 95 %   07/09/17 2200 - 90 25 91/61 95 %   07/09/17 2100 - 89 26 - 94 %   07/09/17 2030 - 91 25 95/60 98 %   07/09/17 2001 98.5 °F (36.9 °C) (!) 101 19 99/67 99 %   07/09/17 2000 - (!) 101 28 - 97 %   07/09/17 1900 - (!) 102 26 110/62 97 %   07/09/17 1815 - (!) 103 26 120/75 98 %   07/09/17 1800 - (!) 110 (!) 32 - 100 %   07/09/17 1732 - 99 (!) 31 115/75 97 %   07/09/17 1700 - 95 21 - (!) 88 %   07/09/17 1645 - 95 27 126/76 98 %   07/09/17 1633 - 99 23 118/66 97 %   07/09/17 1630 - 96 (!) 31 - 92 %   07/09/17 1600 97.9 °F (36.6 °C) (!) 101 24 128/76 96 %       Intake/Output Summary (Last 24 hours) at 07/10/17 1526  Last data filed at 07/10/17 1400   Gross per 24 hour   Intake          2818.54 ml   Output              130 ml   Net          2688.54 ml        PHYSICAL EXAM:  General: WD, WN. Alert, cooperative, no acute distress    EENT:  EOMI. Anicteric sclerae. MMM  Resp:  CTA bilaterally, no wheezing or rales. No accessory muscle use  CV:  Regular  rhythm,  No edema  GI:  Soft, Non distended, Non tender.  +Bowel sounds  Neurologic:  Alert and oriented X 3, shaky voice, tremulous   Psych:   Appears anxious  Skin:  No rashes. No jaundice    Reviewed most current lab test results and cultures  YES  Reviewed most current radiology test results   YES  Review and summation of old records today    NO  Reviewed patient's current orders and MAR    YES  PMH/SH reviewed - no change compared to H&P  ________________________________________________________________________  Care Plan discussed with:    Comments   Patient x    Family      RN x    Care Manager     Consultant                        Multidiciplinary team rounds were held today with , nursing, pharmacist and clinical coordinator.   Patient's plan of care was discussed; medications were reviewed and discharge planning was addressed. ________________________________________________________________________  Total NON critical care TIME:  35   Minutes    Total CRITICAL CARE TIME Spent:   Minutes non procedure based      Comments   >50% of visit spent in counseling and coordination of care     ________________________________________________________________________  Shruthi Preciado MD     Procedures: see electronic medical records for all procedures/Xrays and details which were not copied into this note but were reviewed prior to creation of Plan. LABS:  I reviewed today's most current labs and imaging studies.   Pertinent labs include:  Recent Labs      07/10/17   0401 07/09/17   0953   WBC  2.9*  5.6   HGB  10.1*  12.9   HCT  29.9*  38.6   PLT  160  160     Recent Labs      07/10/17   0401  07/09/17   1720  07/09/17   0953   NA  131*   --   133*   K  3.7   --   3.7   CL  97   --   93*   CO2  23   --   22   GLU  119*   --   100   BUN  39*   --   34*   CREA  9.19*   --   8.58*   CA  7.2*   --   8.7   MG  2.3  2.0  2.3   PHOS  5.0*   --    --    ALB  3.1*   --   4.2   TBILI  1.8*   --   1.9*   SGOT  27   --   42*   ALT  30   --   47       Signed: Shruthi Preciado MD

## 2017-07-10 NOTE — PROGRESS NOTES
Attended Interdisciplinary rounds in Critical Care Unit, where patient care was discussed. Visit by: Owen Zuniga. Connie Valderrama.  Giovany Anaya MA, [address]

## 2017-07-10 NOTE — PROGRESS NOTES
07:20 Report received from [address]  09:45 Stopped precedex per Dr. Luis Alfredo Julio, pt has slight hand tremmors, no other signs of withdrawal at this time. Dr. Richard Daniels at  Bedside, post void bladder scan, if >150 place lopez catheter, may need dialysis of output does not improve. 12:00 has voided once 125ml, post void bladder scan shows no urine in bladder  14:00 pt up to bathroom, loose stools, shaky on his feet, states has no appetite   15:00 placed call to Dr. Richard Daniels, advised post void bladder scan was zero, orders given for NPO after midnight, PT/INR in the morning, hold heparin to prepare to start dialysis on 07.11.17  18:00 pt continues with tremors, diarrhea, some nausea, called Dr. John Felix, restarted precedex, canceled transfer for tonight, needs closer monitoring.    18:45 pt states reduction in tremors, still having diarrhea

## 2017-07-11 LAB
ALBUMIN SERPL BCP-MCNC: 2.8 G/DL (ref 3.5–5)
ALBUMIN/GLOB SERPL: 0.8 {RATIO} (ref 1.1–2.2)
ALP SERPL-CCNC: 45 U/L (ref 45–117)
ALT SERPL-CCNC: 22 U/L (ref 12–78)
ANION GAP BLD CALC-SCNC: 12 MMOL/L (ref 5–15)
AST SERPL W P-5'-P-CCNC: 24 U/L (ref 15–37)
BACTERIA SPEC CULT: NORMAL
BACTERIA SPEC CULT: NORMAL
BASOPHILS # BLD AUTO: 0 K/UL (ref 0–0.1)
BASOPHILS # BLD: 0 % (ref 0–1)
BILIRUB SERPL-MCNC: 1.3 MG/DL (ref 0.2–1)
BUN SERPL-MCNC: 45 MG/DL (ref 6–20)
BUN/CREAT SERPL: 5 (ref 12–20)
C DIFF TOX GENS STL QL NAA+PROBE: NEGATIVE
CALCIUM SERPL-MCNC: 7.4 MG/DL (ref 8.5–10.1)
CC UR VC: NORMAL
CHLORIDE SERPL-SCNC: 102 MMOL/L (ref 97–108)
CO2 SERPL-SCNC: 19 MMOL/L (ref 21–32)
CREAT SERPL-MCNC: 8.83 MG/DL (ref 0.7–1.3)
DIFFERENTIAL METHOD BLD: ABNORMAL
EOSINOPHIL # BLD: 0 K/UL (ref 0–0.4)
EOSINOPHIL NFR BLD: 1 % (ref 0–7)
ERYTHROCYTE [DISTWIDTH] IN BLOOD BY AUTOMATED COUNT: 12.9 % (ref 11.5–14.5)
GLOBULIN SER CALC-MCNC: 3.3 G/DL (ref 2–4)
GLUCOSE SERPL-MCNC: 94 MG/DL (ref 65–100)
HCT VFR BLD AUTO: 31.5 % (ref 36.6–50.3)
HGB BLD-MCNC: 10.9 G/DL (ref 12.1–17)
INR PPP: 1 (ref 0.9–1.1)
LYMPHOCYTES # BLD AUTO: 20 % (ref 12–49)
LYMPHOCYTES # BLD: 0.5 K/UL (ref 0.8–3.5)
MAGNESIUM SERPL-MCNC: 2.1 MG/DL (ref 1.6–2.4)
MCH RBC QN AUTO: 35.5 PG (ref 26–34)
MCHC RBC AUTO-ENTMCNC: 34.6 G/DL (ref 30–36.5)
MCV RBC AUTO: 102.6 FL (ref 80–99)
MONOCYTES # BLD: 0.5 K/UL (ref 0–1)
MONOCYTES NFR BLD AUTO: 20 % (ref 5–13)
NEUTS SEG # BLD: 1.7 K/UL (ref 1.8–8)
NEUTS SEG NFR BLD AUTO: 59 % (ref 32–75)
PHOSPHATE SERPL-MCNC: 4 MG/DL (ref 2.6–4.7)
PLATELET # BLD AUTO: 105 K/UL (ref 150–400)
POTASSIUM SERPL-SCNC: 3.5 MMOL/L (ref 3.5–5.1)
PROT SERPL-MCNC: 6.1 G/DL (ref 6.4–8.2)
PROTHROMBIN TIME: 10.1 SEC (ref 9–11.1)
RBC # BLD AUTO: 3.07 M/UL (ref 4.1–5.7)
RBC MORPH BLD: ABNORMAL
SERVICE CMNT-IMP: NORMAL
SERVICE CMNT-IMP: NORMAL
SODIUM SERPL-SCNC: 133 MMOL/L (ref 136–145)
URATE SERPL-MCNC: 10 MG/DL (ref 3.5–7.2)
WBC # BLD AUTO: 2.7 K/UL (ref 4.1–11.1)

## 2017-07-11 PROCEDURE — 74011250637 HC RX REV CODE- 250/637: Performed by: INTERNAL MEDICINE

## 2017-07-11 PROCEDURE — 85025 COMPLETE CBC W/AUTO DIFF WBC: CPT | Performed by: INTERNAL MEDICINE

## 2017-07-11 PROCEDURE — 36415 COLL VENOUS BLD VENIPUNCTURE: CPT | Performed by: INTERNAL MEDICINE

## 2017-07-11 PROCEDURE — 84100 ASSAY OF PHOSPHORUS: CPT | Performed by: INTERNAL MEDICINE

## 2017-07-11 PROCEDURE — 74011000250 HC RX REV CODE- 250: Performed by: INTERNAL MEDICINE

## 2017-07-11 PROCEDURE — 65270000029 HC RM PRIVATE

## 2017-07-11 PROCEDURE — 83735 ASSAY OF MAGNESIUM: CPT | Performed by: INTERNAL MEDICINE

## 2017-07-11 PROCEDURE — 85610 PROTHROMBIN TIME: CPT | Performed by: INTERNAL MEDICINE

## 2017-07-11 PROCEDURE — 76937 US GUIDE VASCULAR ACCESS: CPT

## 2017-07-11 PROCEDURE — 84550 ASSAY OF BLOOD/URIC ACID: CPT | Performed by: INTERNAL MEDICINE

## 2017-07-11 PROCEDURE — 80053 COMPREHEN METABOLIC PANEL: CPT | Performed by: INTERNAL MEDICINE

## 2017-07-11 PROCEDURE — 74011250636 HC RX REV CODE- 250/636: Performed by: INTERNAL MEDICINE

## 2017-07-11 PROCEDURE — 74011000258 HC RX REV CODE- 258: Performed by: INTERNAL MEDICINE

## 2017-07-11 PROCEDURE — 87493 C DIFF AMPLIFIED PROBE: CPT | Performed by: INTERNAL MEDICINE

## 2017-07-11 RX ORDER — FEBUXOSTAT 40 MG/1
40 TABLET, FILM COATED ORAL DAILY
Status: DISCONTINUED | OUTPATIENT
Start: 2017-07-12 | End: 2017-07-16 | Stop reason: HOSPADM

## 2017-07-11 RX ORDER — LABETALOL HYDROCHLORIDE 5 MG/ML
20 INJECTION, SOLUTION INTRAVENOUS
Status: DISCONTINUED | OUTPATIENT
Start: 2017-07-11 | End: 2017-07-13

## 2017-07-11 RX ORDER — ATENOLOL 25 MG/1
25 TABLET ORAL DAILY
Status: DISCONTINUED | OUTPATIENT
Start: 2017-07-11 | End: 2017-07-15

## 2017-07-11 RX ADMIN — FOLIC ACID 1 MG: 1 TABLET ORAL at 08:18

## 2017-07-11 RX ADMIN — SODIUM CHLORIDE 125 ML/HR: 900 INJECTION, SOLUTION INTRAVENOUS at 04:41

## 2017-07-11 RX ADMIN — LORAZEPAM 2 MG: 2 INJECTION INTRAMUSCULAR; INTRAVENOUS at 21:43

## 2017-07-11 RX ADMIN — MUPIROCIN: 20 OINTMENT TOPICAL at 18:53

## 2017-07-11 RX ADMIN — LORAZEPAM 1 MG: 2 INJECTION INTRAMUSCULAR; INTRAVENOUS at 08:31

## 2017-07-11 RX ADMIN — ATENOLOL 25 MG: 25 TABLET ORAL at 20:11

## 2017-07-11 RX ADMIN — Medication 10 ML: at 21:46

## 2017-07-11 RX ADMIN — WATER: 1000 INJECTION, SOLUTION INTRAVENOUS at 14:09

## 2017-07-11 RX ADMIN — SODIUM CHLORIDE 0.4 MCG/KG/HR: 900 INJECTION, SOLUTION INTRAVENOUS at 04:39

## 2017-07-11 RX ADMIN — LORAZEPAM 2 MG: 2 INJECTION INTRAMUSCULAR; INTRAVENOUS at 12:19

## 2017-07-11 RX ADMIN — MUPIROCIN: 20 OINTMENT TOPICAL at 09:00

## 2017-07-11 RX ADMIN — Medication 100 MG: at 08:18

## 2017-07-11 RX ADMIN — LABETALOL HYDROCHLORIDE 20 MG: 5 INJECTION, SOLUTION INTRAVENOUS at 20:13

## 2017-07-11 RX ADMIN — LORAZEPAM 2 MG: 2 INJECTION INTRAMUSCULAR; INTRAVENOUS at 18:53

## 2017-07-11 RX ADMIN — LORAZEPAM 1 MG: 2 INJECTION INTRAMUSCULAR; INTRAVENOUS at 23:59

## 2017-07-11 RX ADMIN — THERA TABS 1 TABLET: TAB at 08:18

## 2017-07-11 RX ADMIN — LORAZEPAM 2 MG: 2 INJECTION INTRAMUSCULAR; INTRAVENOUS at 05:13

## 2017-07-11 RX ADMIN — LORAZEPAM 2 MG: 1 TABLET ORAL at 16:25

## 2017-07-11 RX ADMIN — LORAZEPAM 2 MG: 2 INJECTION INTRAMUSCULAR; INTRAVENOUS at 14:09

## 2017-07-11 RX ADMIN — Medication 10 ML: at 14:09

## 2017-07-11 RX ADMIN — LORAZEPAM 1 MG: 2 INJECTION INTRAMUSCULAR; INTRAVENOUS at 00:51

## 2017-07-11 RX ADMIN — Medication 10 ML: at 05:15

## 2017-07-11 NOTE — PROGRESS NOTES
Attended Interdisciplinary rounds in Critical Care Unit, where patient care was discussed. Visit by: Elaina Davis. Chalino Johnson.  Chrissy Espinoza MA, [address]

## 2017-07-11 NOTE — PROGRESS NOTES
7/11/2017    INTENSIVIST PROGRESS NOTE:     Patient seen and evaluated   59 yo male admitted with DT's and acute renal failure  Started on precedex drip, now off  Resting comfortable  No acute complaints   No agitation     Visit Vitals    BP (!) 161/98    Pulse 64    Temp 97.5 °F (36.4 °C)    Resp 28    Ht 5' 9\" (1.753 m)    Wt 85.4 kg (188 lb 4.4 oz)    SpO2 92%    BMI 27.8 kg/m2       General: no distress  CV: RRR  Lungs: clear      Labs reviewed    A/P:  Weaned off precedex  Ativan scheduled and prn  Renal fx better? ?   Renal following  Advance diet  Will assist on disposition planning, transfer to floor  Dawn Baltazar MD

## 2017-07-11 NOTE — PROGRESS NOTES
Patient incontinent of liquid yellowish brown stool. Specimen sent for C-diff. Placed in isolation per protocol. Tremors improved after A.M. Ativan dose. Sitting up in chair.

## 2017-07-11 NOTE — PROGRESS NOTES
Patient alert,coopeerative. Thinks he is going home. , explained to patient how we are still helping him. Very tremorous. 1mg ativan  Given IVP. Dr. Ginny Malhotra at bedside. Will restart diet as ordered.

## 2017-07-11 NOTE — PROGRESS NOTES
Progress Note    NAME: Ramya Rock   :  1955   MRN:  [id number]     Date/Time:  2017 PM       Assessment :    Plan:  CLAUDIA  Mild hyponatremia  + AG  Etoh use  Etoh liver dz  HTN   Creatinine 0.81 in . Creatinine slowly improving? -hold on HD initiation for now. Urine with protein and blood since 5086    Certainly a component of dehydration. If creatinine fails to improve would want to send serologic w/u. Change ivf to bicarb    Continue with volume resuscitation. Changed IVF to NS due to hyponatremia  Pt understands he may need hd  Bladder scan       Subjective:   CHIEF COMPLAINT: claudia      Past Medical History:   Diagnosis Date    Alcoholism ([address]     Hypertension     Ill-defined condition     Hernia     Seizures (HCC)     Withdrawal       Past Surgical History:   Procedure Laterality Date    HX HERNIA REPAIR       Social History   Substance Use Topics    Smoking status: Never Smoker    Smokeless tobacco: Never Used    Alcohol use Yes      Comment: - pint vodka/day +/- beer      Family History   Problem Relation Age of Onset    Hypertension Other     Breast Cancer Other       Allergies   Allergen Reactions    Lisinopril Angioedema      Prior to Admission medications    Medication Sig Start Date End Date Taking? Authorizing Provider   atenolol (TENORMIN) 25 mg tablet Take 25 mg by mouth daily. Yes Debi Cruz MD   lidocaine (LIDOCAINE VISCOUS) 2 % solution Take 15 mL by mouth three (3) times daily as needed for Pain. 17   Ronny Martinez MD   guaiFENesin ER (MUCINEX) 600 mg ER tablet Take 1 Tab by mouth two (2) times daily as needed for Congestion.  17   Ronny Martinez MD   OTHER Indications: pt. taking blood pressure medication but doesn't know the name of it    Phys MD Anthony     REVIEW OF SYSTEMS:    Hd denies n/v/cp/sob    Objective:   VITALS:    Visit Vitals    BP (!) 140/100    Pulse 71    Temp 97.5 °F (36.4 °C)    Resp (!) 32    Ht 5' 9\" (1.753 m)    Wt 85.4 kg (188 lb 4.4 oz)    SpO2 98%    BMI 27.8 kg/m2     PHYSICAL EXAM:  Gen:  []  WD []  WN  [] cachectic [x]  thin []  obese []  disheveled             [x]  ill apearing  []   Critical  [x]   Chronic    [x]  No acute distress    HEENT:   [x] NC/AT/PERRLA/EOMI    [] pink conjunctivae      [] pale conjunctivae                  PERRL  [] yes  [] no      [] moist mucosa    [] dry mucosa    hearing intact to voice [] yes  [] No                 NECK:   supple [x] yes  [] no        masses [] yes  [] No               thyroid  []  non tender  []  tender    RESP:   [x] CTA bilaterally/no wheezing/rhonchi/rales/crackles    [] rhonchi bilaterally - no dullness  [] wheezing   [] rhonchi   [] crackles     use of accessory muscles [] yes [x] no    CARD:   [x]  regular rate and rhythm/No murmurs/rubs/gallops    murmur  [] yes ()  [] no      Rubs  [] yes  [] no       Gallops [] yes  [] no    Rate []  regular  []  irregular        carotid bruits  [] Right  []  Left                 LE edema [] yes  [x] no           JVP  []  yes   [x]  no    ABD:    [x] soft/non distended/non tender/+bowel sounds/no HSM, (+) hernia    []  Rigid    tenderness [] yes [] no   Liver enlargement  []  yes []  no                Spleen enlargement  []  yes []  no     distended []  yes [] no     bowel sound  [] hypoactive   [] hyperactive    LYMPH:    Neck []  yes []  no       Axillae []  yes []  no    SKIN:   Rashes []  yes   [x]  no    Ulcers []  yes   []  no               [] tight to palpitation    skin turgor []  good  [] poor  [] decreased               Cyanosis/clubbing []  yes []  no    NEUR:   [x] cranial nerves II-XII grossly intact       [] Cranial nerves deficit                 []  facial droop    []  slurred speech   [] aphasic     [] Strength normal     []  weakness  []  LUE  []   RUE/ []  LLE  []   RLE    follows commands  [x]  yes []  no           PSYCH:   insight [] poor [] good   Alert and Oriented to  [x] person  [x] place  [x]  time [] depressed [] anxious [] agitated  [] lethargic [] stuporous  [] sedated     LAB DATA REVIEWED:    Recent Labs      07/11/17   0432  07/10/17   0401   WBC  2.7*  2.9*   HGB  10.9*  10.1*   HCT  31.5*  29.9*   PLT  105*  160     Recent Labs      07/11/17   0432  07/10/17   0401  07/09/17   1720  07/09/17   0953   NA  133*  131*   --   133*   K  3.5  3.7   --   3.7   CL  102  97   --   93*   CO2  19*  23   --   22   BUN  45*  39*   --   34*   CREA  8.83*  9.19*   --   8.58*   GLU  94  119*   --   100   CA  7.4*  7.2*   --   8.7   MG  2.1  2.3  2.0  2.3   PHOS  4.0  5.0*   --    --    URICA  10.0*   --    --    --      Recent Labs      07/11/17   0432  07/10/17   0401  07/09/17   0953   SGOT  24  27  42*   ALT  22  30  47   AP  45  40*  53   TBILI  1.3*  1.8*  1.9*   ALB  2.8*  3.1*  4.2   GLOB  3.3  3.3  3.8   LPSE   --   362  267     Recent Labs      07/11/17 0432   INR  1.0   PTP  10.1      No results for input(s): FE, TIBC, PSAT, FERR in the last 72 hours. No results for input(s): PH, PCO2, PO2 in the last 72 hours.   Recent Labs      07/09/17   0953   CPK  183     Lab Results   Component Value Date/Time    Glucose (POC) 84 12/03/2015 09:34 AM       Procedures: see electronic medical records for all procedures/Xrays and details which were not copied into this note but were reviewed prior to creation of Plan.    ________________________________________________________________________       ___________________________________________________  Consulting Physician: Rene Inman MD

## 2017-07-11 NOTE — PROGRESS NOTES
1100: Report received, orders and chart reviewed. 1200: Assessment completed and noted. VSS. Pt sitting in chair. Dr. Zohra Jimenez @ bedside. Update given, orders received. 2mg Ativan given for tremors per patient request.  Will monitor. 1300: Pt had diarrhea episode in bedside commode. Placed back to bed.      1600: Assessment completed and noted. Pt is tremorous and agitated. Pt ripped out all PIVs Pt stated \"They sting, I want them out\"  RN attempted multiple sticks to establish a new line. Dr. Davide Garibay notified, orders recevied for PICC line. However, unable to obtain PICC as patient is a renal patient. PICC team to place PIVs by Ultrasound. Tolerated well.  $mg Ativan given per CIWA of 9. Will monitor. 1805: Pt had Large BM in bed. Pt assisted to the [facility] by PCT; Pt bathed and linens changed. VSS. Will monitor.

## 2017-07-11 NOTE — PROGRESS NOTES
Hospitalist Progress Note    NAME: Irene Hendricks   :  1955   MRN:  [id number]       Assessment / Plan:  Acute renal failure  Hyponatremia  -? Pre-renal  -renal US wnl  -cont' IVF- NS dt hyponatremia  -hold nephrotoxic agents  -nephro following    UTI  -cont' rocephin  -f/u urine cx-mixed sunny    Elevated Tbili  Hepatic steatosis in the setting of chronic alcoholism  -liver US 7/10/17 Hepatic steatosis, small right hepatic mass. Gallstones. -follow labs. - Will need Ct abdomen for further evaluation of hepatic mass. Alcohol abuse  -at risk for DT  -CIWA, off precedex  -folic acid, MVI, thiamine    HTN  -cont' atenolol    Hx of CVA  -old CT head in  showed lacunar infarct involving the basal ganglia. Pt was not aware of such finding.   -start ASA when renal fxn recovers    Neutropenia, likely secondary to alcohol        Body mass index is 27.8 kg/(m^2). Code status: Full  Prophylaxis: SCDs  Recommended Disposition: Home w/Family     Subjective:     Chief Complaint / Reason for Physician Visit  Pt seen at bedside. No acute complaints. Discussed with RN events overnight. Review of Systems:  Symptom Y/N Comments  Symptom Y/N Comments   Fever/Chills n   Chest Pain n    Poor Appetite    Edema     Cough    Abdominal Pain n    Sputum    Joint Pain     SOB/AMEZQUITA n   Pruritis/Rash     Nausea/vomit n   Tolerating PT/OT     Diarrhea    Tolerating Diet     Constipation    Other y tremulous     Could NOT obtain due to:      Objective:     VITALS:   Last 24hrs VS reviewed since prior progress note.  Most recent are:  Patient Vitals for the past 24 hrs:   Temp Pulse Resp BP SpO2   17 1100 - 71 (!) 32 - -   17 1000 - 82 18 - -   17 0900 - (!) 56 26 (!) 140/100 -   17 0805 - 64 28 (!) 161/98 -   17 0800 - 67 26 - -   17 07 - 68 18 - 98 %   17 0630 - 65 28 125/81 92 %   17 0600 - 84 17 (!) 146/96 -   17 0530 - 63 24 125/90 94 %   17 0500 - 63 25 133/88 93 %   07/11/17 0436 97.5 °F (36.4 °C) 68 23 (!) 126/91 97 %   07/11/17 0400 - 70 22 118/79 91 %   07/11/17 0330 - 68 25 118/80 92 %   07/11/17 0300 - 67 30 111/82 95 %   07/11/17 0230 - 72 25 107/68 -   07/11/17 0200 - 70 25 110/71 -   07/11/17 0130 - 76 (!) 34 112/82 95 %   07/11/17 0100 - 70 (!) 31 109/75 95 %   07/11/17 0000 97.8 °F (36.6 °C) 73 23 129/88 100 %   07/10/17 2300 - 79 (!) 31 114/75 97 %   07/10/17 2230 - 83 28 97/61 91 %   07/10/17 2206 - 88 21 127/77 -   07/10/17 2200 - 84 28 96/64 92 %   07/10/17 2100 - 78 (!) 33 100/74 94 %   07/10/17 2000 - 82 29 115/71 96 %   07/10/17 1930 98.4 °F (36.9 °C) 84 28 106/74 96 %   07/10/17 1900 - 85 (!) 34 114/77 95 %   07/10/17 1800 - 92 29 (!) 144/91 98 %   07/10/17 1700 - 88 19 137/82 96 %   07/10/17 1600 98.9 °F (37.2 °C) 85 26 140/90 100 %   07/10/17 1500 - 85 29 124/82 94 %   07/10/17 1400 - 82 28 116/88 98 %       Intake/Output Summary (Last 24 hours) at 07/11/17 1343  Last data filed at 07/11/17 1059   Gross per 24 hour   Intake          2183.02 ml   Output              625 ml   Net          1558.02 ml        PHYSICAL EXAM:  General: WD, WN. Alert, cooperative, no acute distress    EENT:  EOMI. Anicteric sclerae. MMM  Resp:  CTA bilaterally, no wheezing or rales. No accessory muscle use  CV:  Regular  rhythm,  No edema  GI:  Soft, Non distended, Non tender.  +Bowel sounds  Neurologic:  Alert and oriented X 3, shaky voice, tremulous   Psych:   Appears anxious  Skin:  No rashes.   No jaundice    Reviewed most current lab test results and cultures  YES  Reviewed most current radiology test results   YES  Review and summation of old records today    NO  Reviewed patient's current orders and MAR    YES  PMH/SH reviewed - no change compared to H&P  ________________________________________________________________________  Care Plan discussed with:    Comments   Patient x    Family      RN x    Care Manager     Consultant Multidiciplinary team rounds were held today with , nursing, pharmacist and clinical coordinator. Patient's plan of care was discussed; medications were reviewed and discharge planning was addressed. ________________________________________________________________________  Total NON critical care TIME:  30   Minutes    Total CRITICAL CARE TIME Spent:   Minutes non procedure based      Comments   >50% of visit spent in counseling and coordination of care     ________________________________________________________________________  Asim Silverman MD     Procedures: see electronic medical records for all procedures/Xrays and details which were not copied into this note but were reviewed prior to creation of Plan. LABS:  I reviewed today's most current labs and imaging studies.   Pertinent labs include:  Recent Labs      07/11/17   0432  07/10/17   0401  07/09/17   0953   WBC  2.7*  2.9*  5.6   HGB  10.9*  10.1*  12.9   HCT  31.5*  29.9*  38.6   PLT  105*  160  160     Recent Labs      07/11/17   0432  07/10/17   0401  07/09/17   1720  07/09/17   0953   NA  133*  131*   --   133*   K  3.5  3.7   --   3.7   CL  102  97   --   93*   CO2  19*  23   --   22   GLU  94  119*   --   100   BUN  45*  39*   --   34*   CREA  8.83*  9.19*   --   8.58*   CA  7.4*  7.2*   --   8.7   MG  2.1  2.3  2.0  2.3   PHOS  4.0  5.0*   --    --    ALB  2.8*  3.1*   --   4.2   TBILI  1.3*  1.8*   --   1.9*   SGOT  24  27   --   42*   ALT  22  30   --   47   INR  1.0   --    --    --        Signed: Asim Silverman MD

## 2017-07-12 ENCOUNTER — APPOINTMENT (OUTPATIENT)
Dept: CT IMAGING | Age: 62
DRG: 683 | End: 2017-07-12
Attending: INTERNAL MEDICINE
Payer: COMMERCIAL

## 2017-07-12 LAB
ALBUMIN SERPL BCP-MCNC: 3 G/DL (ref 3.5–5)
ALBUMIN/GLOB SERPL: 0.8 {RATIO} (ref 1.1–2.2)
ALP SERPL-CCNC: 51 U/L (ref 45–117)
ALT SERPL-CCNC: 22 U/L (ref 12–78)
ANION GAP BLD CALC-SCNC: 13 MMOL/L (ref 5–15)
AST SERPL W P-5'-P-CCNC: 28 U/L (ref 15–37)
BILIRUB SERPL-MCNC: 1.2 MG/DL (ref 0.2–1)
BUN SERPL-MCNC: 44 MG/DL (ref 6–20)
BUN/CREAT SERPL: 9 (ref 12–20)
CALCIUM SERPL-MCNC: 8.1 MG/DL (ref 8.5–10.1)
CHLORIDE SERPL-SCNC: 99 MMOL/L (ref 97–108)
CO2 SERPL-SCNC: 21 MMOL/L (ref 21–32)
CREAT SERPL-MCNC: 4.95 MG/DL (ref 0.7–1.3)
GLOBULIN SER CALC-MCNC: 3.6 G/DL (ref 2–4)
GLUCOSE SERPL-MCNC: 81 MG/DL (ref 65–100)
MAGNESIUM SERPL-MCNC: 1.7 MG/DL (ref 1.6–2.4)
PHOSPHATE SERPL-MCNC: 3 MG/DL (ref 2.6–4.7)
POTASSIUM SERPL-SCNC: 3.4 MMOL/L (ref 3.5–5.1)
PROT SERPL-MCNC: 6.6 G/DL (ref 6.4–8.2)
SODIUM SERPL-SCNC: 133 MMOL/L (ref 136–145)

## 2017-07-12 PROCEDURE — 84100 ASSAY OF PHOSPHORUS: CPT | Performed by: INTERNAL MEDICINE

## 2017-07-12 PROCEDURE — 74011000250 HC RX REV CODE- 250: Performed by: INTERNAL MEDICINE

## 2017-07-12 PROCEDURE — 74011250637 HC RX REV CODE- 250/637: Performed by: INTERNAL MEDICINE

## 2017-07-12 PROCEDURE — 74011250636 HC RX REV CODE- 250/636: Performed by: INTERNAL MEDICINE

## 2017-07-12 PROCEDURE — 83735 ASSAY OF MAGNESIUM: CPT | Performed by: INTERNAL MEDICINE

## 2017-07-12 PROCEDURE — 36415 COLL VENOUS BLD VENIPUNCTURE: CPT | Performed by: INTERNAL MEDICINE

## 2017-07-12 PROCEDURE — 74176 CT ABD & PELVIS W/O CONTRAST: CPT

## 2017-07-12 PROCEDURE — 74011250636 HC RX REV CODE- 250/636: Performed by: HOSPITALIST

## 2017-07-12 PROCEDURE — 65660000000 HC RM CCU STEPDOWN

## 2017-07-12 PROCEDURE — 80053 COMPREHEN METABOLIC PANEL: CPT | Performed by: INTERNAL MEDICINE

## 2017-07-12 RX ORDER — LORAZEPAM 2 MG/ML
2 INJECTION INTRAMUSCULAR ONCE
Status: COMPLETED | OUTPATIENT
Start: 2017-07-12 | End: 2017-07-12

## 2017-07-12 RX ORDER — HALOPERIDOL 5 MG/ML
5 INJECTION INTRAMUSCULAR ONCE
Status: COMPLETED | OUTPATIENT
Start: 2017-07-12 | End: 2017-07-12

## 2017-07-12 RX ORDER — POTASSIUM CHLORIDE 20 MEQ/1
20 TABLET, EXTENDED RELEASE ORAL
Status: COMPLETED | OUTPATIENT
Start: 2017-07-12 | End: 2017-07-12

## 2017-07-12 RX ORDER — POTASSIUM CHLORIDE 750 MG/1
40 TABLET, FILM COATED, EXTENDED RELEASE ORAL
Status: COMPLETED | OUTPATIENT
Start: 2017-07-12 | End: 2017-07-12

## 2017-07-12 RX ADMIN — Medication 10 ML: at 05:00

## 2017-07-12 RX ADMIN — FEBUXOSTAT 40 MG: 40 TABLET ORAL at 08:07

## 2017-07-12 RX ADMIN — LABETALOL HYDROCHLORIDE 20 MG: 5 INJECTION, SOLUTION INTRAVENOUS at 19:37

## 2017-07-12 RX ADMIN — LORAZEPAM 2 MG: 2 INJECTION INTRAMUSCULAR; INTRAVENOUS at 22:48

## 2017-07-12 RX ADMIN — LORAZEPAM 2 MG: 1 TABLET ORAL at 19:33

## 2017-07-12 RX ADMIN — FOLIC ACID 1 MG: 1 TABLET ORAL at 08:08

## 2017-07-12 RX ADMIN — WATER: 1000 INJECTION, SOLUTION INTRAVENOUS at 05:45

## 2017-07-12 RX ADMIN — LORAZEPAM 2 MG: 2 INJECTION INTRAMUSCULAR; INTRAVENOUS at 05:00

## 2017-07-12 RX ADMIN — THERA TABS 1 TABLET: TAB at 08:08

## 2017-07-12 RX ADMIN — LORAZEPAM 2 MG: 2 INJECTION INTRAMUSCULAR; INTRAVENOUS at 23:53

## 2017-07-12 RX ADMIN — POTASSIUM CHLORIDE 20 MEQ: 1500 TABLET, EXTENDED RELEASE ORAL at 16:55

## 2017-07-12 RX ADMIN — LORAZEPAM 2 MG: 2 INJECTION INTRAMUSCULAR; INTRAVENOUS at 00:50

## 2017-07-12 RX ADMIN — LORAZEPAM 1 MG: 2 INJECTION INTRAMUSCULAR; INTRAVENOUS at 18:23

## 2017-07-12 RX ADMIN — LORAZEPAM 1 MG: 2 INJECTION INTRAMUSCULAR; INTRAVENOUS at 11:44

## 2017-07-12 RX ADMIN — HALOPERIDOL LACTATE 5 MG: 5 INJECTION, SOLUTION INTRAMUSCULAR at 22:48

## 2017-07-12 RX ADMIN — LORAZEPAM 1 MG: 2 INJECTION INTRAMUSCULAR; INTRAVENOUS at 20:47

## 2017-07-12 RX ADMIN — LORAZEPAM 2 MG: 2 INJECTION INTRAMUSCULAR; INTRAVENOUS at 16:55

## 2017-07-12 RX ADMIN — MUPIROCIN: 20 OINTMENT TOPICAL at 18:23

## 2017-07-12 RX ADMIN — Medication 100 MG: at 08:08

## 2017-07-12 RX ADMIN — LORAZEPAM 2 MG: 2 INJECTION INTRAMUSCULAR; INTRAVENOUS at 09:34

## 2017-07-12 RX ADMIN — LORAZEPAM 2 MG: 2 INJECTION INTRAMUSCULAR; INTRAVENOUS at 21:52

## 2017-07-12 RX ADMIN — LORAZEPAM 2 MG: 2 INJECTION INTRAMUSCULAR; INTRAVENOUS at 05:59

## 2017-07-12 RX ADMIN — Medication 10 ML: at 21:59

## 2017-07-12 RX ADMIN — Medication 10 ML: at 14:05

## 2017-07-12 RX ADMIN — LORAZEPAM 2 MG: 2 INJECTION INTRAMUSCULAR; INTRAVENOUS at 14:05

## 2017-07-12 RX ADMIN — POTASSIUM CHLORIDE 40 MEQ: 750 TABLET, FILM COATED, EXTENDED RELEASE ORAL at 11:03

## 2017-07-12 RX ADMIN — MUPIROCIN: 20 OINTMENT TOPICAL at 08:08

## 2017-07-12 NOTE — PROGRESS NOTES
0700: Received bedside and verbal shift report from Francesca OLMSTEAD RN.  0800: Assessment complete, see summary for details; pt alert and oriented X's 4, lungs clears, NSR, pulses palpable in all 4 extremities, afebrile, no complaints of pain. 0920: pt attempted four times to get out of chair to \"go to the parking lot\" when asked why he wants to go to the parking lot \"to get to the other side of [place]\", very agitated and getting more frustrated that we are not allowing him to leave, attempted to re-orient and however pt continues to attempt to get up; gave 2 mg of PRN Ativan. 1000: Off floor to CT. 1030: Infection prevention called to confirm C-diff sample was negative; D/C enteric isolation precautions. 1144: Pt is becoming more anxious and agitated, he is stating \"I want to go to the parking lot\" pt is getting irritated with staff when instructed that he is not able to leave to go to the parking lot, gave 1 mg of PRN Ativan. 1215: Called verbal shift report to RICARDO Jackson. TRANSFER - OUT REPORT:    Verbal report given to Renetta RN on Jordana Colin  being transferred to Oncology for change in patient condition( )       Report consisted of patients Situation, Background, Assessment and   Recommendations(SBAR). Information from the following report(s) SBAR, Kardex, ED Summary, Intake/Output, MAR, Recent Results and Cardiac Rhythm NSR was reviewed with the receiving nurse. Lines:   Peripheral IV 07/11/17 Right Arm (Active)   Site Assessment Clean, dry, & intact 7/12/2017  8:00 AM   Phlebitis Assessment 0 7/12/2017  8:00 AM   Infiltration Assessment 0 7/12/2017  8:00 AM   Dressing Status Clean, dry, & intact 7/12/2017  8:00 AM   Dressing Type Transparent 7/12/2017  8:00 AM   Hub Color/Line Status Pink; Infusing 7/12/2017  8:00 AM   Alcohol Cap Used Yes 7/12/2017  8:00 AM        Opportunity for questions and clarification was provided.       Patient transported with:   [employer]

## 2017-07-12 NOTE — PROGRESS NOTES
7/12/2017    INTENSIVIST PROGRESS NOTE:     Patient seen and evaluated   59 yo male admitted with DT's and acute renal failure  Started on precedex drip, now off  Resting comfortable  No acute complaints   No agitation     Visit Vitals    BP (!) 167/93 (BP 1 Location: Left arm, BP Patient Position: At rest)    Pulse 75    Temp 98.1 °F (36.7 °C)    Resp 23    Ht 5' 9\" (1.753 m)    Wt 84.3 kg (185 lb 13.6 oz)    SpO2 100%    BMI 27.44 kg/m2       General: no distress  CV: RRR  Lungs: clear      Labs reviewed    A/P:  Off precedex  Ativan scheduled and prn  Renal fx better  Renal following  Advance diet  Mobilize   Will assist on disposition planning, transfer to floor, day 2 awaiting for a bed  Umesh Wakefield MD

## 2017-07-12 NOTE — PROGRESS NOTES
2003- Dr Maciel Estrada aware of SBP >200. Orders received. 2200- PIV right arm infiltrated. Will remove and change fluids to left arm IV.  2220- PIV left arm infiltrated. Will attempt to start new PIV.   0000- Pt pulling off ECG leads, gown, attempting to get out of bed. 1mg Ativan given. 0020- Pt pulled out only IV. \"made me paranoid looking at it, turn it off\". Will attempt to start new IV.   0035- Pt getting out of bed again \"a mullins woke me up\". Attempt to reorient, pt back to bed. Page out to hospitalist.  7188- Updated Dr Abdiel Johnson on pt condition. Orders received to place sitter at bedside. 0327- Pt cooperative this am, apologizing for overnight behavior. REassess unchanged. Lab stick attempted x 2, able to send CMP but not CBC.  0600- Pt attempting to get out of bed, pulling phone cord out of wall. States \"going home to get pants\". Pt with tremors,  very restless and arguing with sitter and RN. Ativan given per AUSTYN.  0720- Bedside and verbal report given to [facility].

## 2017-07-12 NOTE — PROGRESS NOTES
PCU SHIFT NURSING NOTE      1915: Bedside and Verbal shift change report given to Pritesh Dickinson RN (oncoming nurse) by Sharon Smallwood (offgoing nurse). Report included the following information SBAR, Kardex, ED Summary, Intake/Output, MAR, Accordion, Recent Results and Cardiac Rhythm NSR. Shift Summary:   1926: Pt sitting on side of bed. /112. Prn labetolol given. 2mg PO ativan given per CIWA protocol. Sitter at bedside. 2024: Pts BP now 144/101.  2125: Pt continues to be confused and agitated, as well as experiencing increasing tremors and hallucinations. Will continue to monitor CIWA/ativan. Sitter at bedside. 2230: Pt becoming increasingly agitated and confused. Pt trying to get out of bed with sitter at bedside. Ativan q1h being given. [place] hospitalist. Spoke with Dr. Koby Plunkett. Dr. Koby Plunkett made aware of pts status. New orders for haldol 5mg IM now and ativan 2mg IV now. Haldol and ativan given. Pt continued to become more agitated and not wanting people to touch him. Pt thought he was driving a car and needed to \"go to the store and get a beer. \"   [phone]: Called supervisor to get opinion on next plan of action as meds were not helping. They suggested calling code atlas. Called code atlas. Security and supervisor at bedside. Pt increasingly agitated and trying to get out of bed with no relief. 2342: Spoke with Dr. Koby Plunkett and Dr. Emanuel Ricks regarding pts status. New orders for transfer to [address] [place] [address] Rd: TRANSFER - OUT REPORT:    Verbal report given to Olivia RN(name) on Linsey Matters  being transferred to CCU(unit) for change in patient condition(CIWA 17 requires higher level of care)       Report consisted of patients Situation, Background, Assessment and   Recommendations(SBAR). Information from the following report(s) SBAR, Kardex, ED Summary, Intake/Output, MAR, Accordion, Recent Results and Cardiac Rhythm NSR was reviewed with the receiving nurse. Opportunity for questions and clarification was provided. Pts IV access infiltrated. Multiple attempts by primary RN and another RN to get another access. Asked RN if someone from the unit could come try to get access before bringing back pt. RN stated to just bring pt back to unit and they would get access once pt arrived. Patient transported with   Monitor  Registered Nurse at 0866 via bed. Admission Date 7/9/2017   Admission Diagnosis ARF (acute renal failure) ([facility] [address]  [facility]  ARF (acute renal failure) ([facility])   Consults IP CONSULT TO NEPHROLOGY        Consults   []PT   []OT   []Speech   []Case Management      [] Palliative      Cardiac Monitoring Order   []Yes   []No     IV drips   []Yes    Drip:                            Dose:  Drip:                            Dose:  Drip:                            Dose:   []No     GI Prophylaxis   []Yes   []No         DVT Prophylaxis   SCDs:             Solis stockings:         [] Medication   []Contraindicated   []None      Activity Level Activity Level: Up with Assistance     Activity Assistance: Partial (one person)   Purposeful Rounding every 1-2 hour? []Yes   Atkinson Score  Total Score: 3   Bed Alarm (If score 3 or >)   []Yes   [] Refused (See signed refusal form in chart)   Robinson Score  Robinson Score: 19   Robinson Score (if score 14 or less)   []PMT consult   []Wound Care consult      []Specialty bed   [] Nutrition consult          Needs prior to discharge:   Home O2 required:    []Yes   []No    If yes, how much O2 required?     Other:    Last Bowel Movement: Last Bowel Movement Date: 07/12/17      Influenza Vaccine Received Flu Vaccine for Current Season (usually Sept-March): Not Flu Season        Pneumonia Vaccine           Diet Active Orders   Diet    DIET RENAL WITH OPTIONS 80-80-80; Regular      LDAs               Peripheral IV 07/11/17 Right Arm (Active)   Site Assessment Clean, dry, & intact 7/12/2017  4:41 PM   Phlebitis Assessment 0 7/12/2017  4:41 PM   Infiltration Assessment 0 7/12/2017 4:41 PM   Dressing Status Clean, dry, & intact 7/12/2017  4:41 PM   Dressing Type Transparent 7/12/2017  4:41 PM   Hub Color/Line Status Pink; Infusing 7/12/2017 12:00 PM   Alcohol Cap Used Yes 7/12/2017 12:00 PM                      Urinary Catheter      Intake & Output   Date 07/11/17 0700 - 07/12/17 0659 07/12/17 0700 - 07/13/17 0659   Shift 6603-5809 0566-7546 24 Hour Total 5454-4142 0529-7788 24 Hour Total   I  N  T  A  K  E   P. O. 500  500         P. O. 500  500       I.V.  (mL/kg/hr) 1059.4  (1) 1099  (1.1) 2158.4  (1.1) 480.2  480.2      Volume (0.9% sodium chloride infusion) 750  750         Volume (sodium acetate 150 mEq in sterile water 1,000 mL infusion) 309.4 1099 1408.4 480.2  480.2    Shift Total  (mL/kg) 1559.4  (18.3) 1099  (13) 2658.4  (31.5) 480.2  (5.7)  480.2  (5.7)   O  U  T  P  U  T   Urine  (mL/kg/hr) 425  (0.4) 1800  (1.8) 2225  (1.1) 425  425      Urine Voided 425 1800 2225 425  425      Urine Occurrence(s) 2 x 4 x 6 x       Stool            Stool Occurrence(s) 4 x 1 x 5 x 2 x  2 x    Shift Total  (mL/kg) 425  (5) 1800  (21.4) 2225  (26.4) 425  (5)  425  (5)   NET 1134.4 -701 433.4 55.2  55.2   Weight (kg) 85.4 84.3 84.3 84.3 84.3 84.3         Readmission Risk Assessment Tool Score Medium Risk            17       Total Score        3 Relationship with PCP    2 Patient Living Status    4 More than 1 Admission in calendar year    8 Charlson Comorbidity Score        Criteria that do not apply:    Patient Length of Stay > 5    Patient Insurance is Medicare, Medicaid or Self Pay       Expected Length of Stay 3d 9h   Actual Length of Stay 3

## 2017-07-12 NOTE — PROGRESS NOTES
Progress Note    NAME: Katya Duran   :  1955   MRN:  [id number]     Date/Time:  2017 PM       Assessment :    Plan:  CLAUDIA  Mild hyponatremia  + AG  Etoh use  Etoh liver dz  HTN   Creatinine 0.81 in . Now improved to 5  Urine with protein and blood since   Replete K  Continue uloric for hyperuricemia  Certainly a component of dehydration. If creatinine fails to improve would want to send serologic w/u. Changed ivf to bicarb--continue for now but at reduced rate    Has sitter now-inappropriate giggling/giddy like behavior on rounds       Subjective:   CHIEF COMPLAINT: none, wants to go home      Past Medical History:   Diagnosis Date    Alcoholism ([facility] [address]     Hypertension     Ill-defined condition     Hernia     Seizures ([facility] [address]     Withdrawal       Past Surgical History:   Procedure Laterality Date    HX HERNIA REPAIR       Social History   Substance Use Topics    Smoking status: Never Smoker    Smokeless tobacco: Never Used    Alcohol use Yes      Comment: 2-1 pint vodka/day +/- beer      Family History   Problem Relation Age of Onset    Hypertension Other     Breast Cancer Other       Allergies   Allergen Reactions    Lisinopril Angioedema      Prior to Admission medications    Medication Sig Start Date End Date Taking? Authorizing Provider   atenolol (TENORMIN) 25 mg tablet Take 25 mg by mouth daily. Yes Debi Cruz MD   lidocaine (LIDOCAINE VISCOUS) 2 % solution Take 15 mL by mouth three (3) times daily as needed for Pain. 17   Rosalina Graham MD   guaiFENesin ER (MUCINEX) 600 mg ER tablet Take 1 Tab by mouth two (2) times daily as needed for Congestion.  17   Rosalina Graham MD   OTHER Indications: pt. taking blood pressure medication but doesn't know the name of it    Phys MD Anthony     REVIEW OF SYSTEMS:    Hd denies n/v/cp/sob    Objective:   VITALS:    Visit Vitals    BP (!) 167/93 (BP 1 Location: Left arm, BP Patient Position: At rest)    Pulse 75    Temp 98.1 °F (36.7 °C)    Resp 23    Ht 5' 9\" (1.753 m)    Wt 84.3 kg (185 lb 13.6 oz)    SpO2 100%    BMI 27.44 kg/m2     PHYSICAL EXAM:  Gen:  []  WD []  WN  [] cachectic [x]  thin []  obese []  disheveled             [x]  ill apearing  []   Critical  [x]   Chronic    [x]  No acute distress    HEENT:   [x] NC/AT/PERRLA/EOMI    [] pink conjunctivae      [] pale conjunctivae                  PERRL  [] yes  [] no      [] moist mucosa    [] dry mucosa    hearing intact to voice [] yes  [] No                 NECK:   supple [x] yes  [] no        masses [] yes  [] No               thyroid  []  non tender  []  tender    RESP:   [x] CTA bilaterally/no wheezing/rhonchi/rales/crackles    [] rhonchi bilaterally - no dullness  [] wheezing   [] rhonchi   [] crackles     use of accessory muscles [] yes [x] no    CARD:   [x]  regular rate and rhythm/No murmurs/rubs/gallops    murmur  [] yes ()  [] no      Rubs  [] yes  [] no       Gallops [] yes  [] no    Rate []  regular  []  irregular        carotid bruits  [] Right  []  Left                 LE edema [] yes  [x] no           JVP  []  yes   [x]  no    ABD:    [x] soft/non distended/non tender/+bowel sounds/no HSM, (+) hernia    []  Rigid    tenderness [] yes [] no   Liver enlargement  []  yes []  no                Spleen enlargement  []  yes []  no     distended []  yes [] no     bowel sound  [] hypoactive   [] hyperactive    LYMPH:    Neck []  yes []  no       Axillae []  yes []  no    SKIN:   Rashes []  yes   [x]  no    Ulcers []  yes   []  no               [] tight to palpitation    skin turgor []  good  [] poor  [] decreased               Cyanosis/clubbing []  yes []  no    NEUR:   [x] cranial nerves II-XII grossly intact       [] Cranial nerves deficit                 []  facial droop    []  slurred speech   [] aphasic     [] Strength normal     []  weakness  []  LUE  []   RUE/ []  LLE  []   RLE    follows commands  [x]  yes []  no           PSYCH:   insight [] poor [] good Alert and Oriented to  [x] person  [x] place  [x]  time                    [] depressed [] anxious [] agitated  [] lethargic [] stuporous  [] sedated     LAB DATA REVIEWED:    Recent Labs      07/11/17   0432  07/10/17   0401   WBC  2.7*  2.9*   HGB  10.9*  10.1*   HCT  31.5*  29.9*   PLT  105*  160     Recent Labs      07/12/17   0355  07/11/17   0432  07/10/17   0401   NA  133*  133*  131*   K  3.4*  3.5  3.7   CL  99  102  97   CO2  21  19*  23   BUN  44*  45*  39*   CREA  4.95*  8.83*  9.19*   GLU  81  94  119*   CA  8.1*  7.4*  7.2*   MG  1.7  2.1  2.3   PHOS  3.0  4.0  5.0*   URICA   --   10.0*   --      Recent Labs      07/12/17   0355  07/11/17   0432  07/10/17   0401   SGOT  28  24  27   ALT  22  22  30   AP  51  45  40*   TBILI  1.2*  1.3*  1.8*   ALB  3.0*  2.8*  3.1*   GLOB  3.6  3.3  3.3   LPSE   --    --   362     Recent Labs      07/11/17 0432   INR  1.0   PTP  10.1      No results for input(s): FE, TIBC, PSAT, FERR in the last 72 hours. No results for input(s): PH, PCO2, PO2 in the last 72 hours. No results for input(s): CPK, CKMB in the last 72 hours.     No lab exists for component: TROPONINI  Lab Results   Component Value Date/Time    Glucose (POC) 84 12/03/2015 09:34 AM       Procedures: see electronic medical records for all procedures/Xrays and details which were not copied into this note but were reviewed prior to creation of Plan.    ________________________________________________________________________       ___________________________________________________  Consulting Physician: All Barragan MD

## 2017-07-12 NOTE — PROGRESS NOTES
Hospitalist Progress Note    NAME: Andree Washington   :  1955   MRN:  [id number]       Assessment / Plan:  Acute renal failure  Hyponatremia  -? Pre-renal  -renal US wnl  -cont' IVF- Na Acetate decrease rate    -hold nephrotoxic agents  -nephro following    UTI  -cont' rocephin  -f/u urine cx-mixed sunny    Elevated Tbili  Hepatic steatosis in the setting of chronic alcoholism  -liver US 7/10/17 Hepatic steatosis, small right hepatic mass. Gallstones. -follow labs. -  Ct abdomen 17 fat-containing umbilical hernia. Hepatic steatosis. There is a high attenuation, rounded lesion in the liver that measures approximately 0.8 cm, likely not significantly changed since 2015. Alcohol abuse  -at risk for DT  -CIWA, off precedex  -folic acid, MVI, thiamine    HTN  -cont' atenolol    Hx of CVA  -old CT head in  showed lacunar infarct involving the basal ganglia. Pt was not aware of such finding.   -start ASA when renal fxn recovers    Neutropenia,   likely secondary to alcohol, monitor        Body mass index is 27.44 kg/(m^2). Code status: Full  Prophylaxis: SCDs  Recommended Disposition: Home w/Family     Subjective:     Chief Complaint / Reason for Physician Visit  Pt seen at bedside. No acute complaints. Discussed with RN events overnight. Review of Systems:  Symptom Y/N Comments  Symptom Y/N Comments   Fever/Chills n   Chest Pain n    Poor Appetite    Edema     Cough    Abdominal Pain n    Sputum    Joint Pain     SOB/AMEZQUITA n   Pruritis/Rash     Nausea/vomit n   Tolerating PT/OT     Diarrhea    Tolerating Diet     Constipation    Other y tremulous     Could NOT obtain due to:      Objective:     VITALS:   Last 24hrs VS reviewed since prior progress note.  Most recent are:  Patient Vitals for the past 24 hrs:   Temp Pulse Resp BP SpO2   17 1409 98 °F (36.7 °C) 97 21 143/89 99 %   17 1200 98.4 °F (36.9 °C) 65 17 (!) 172/91 100 %   17 0800 98.1 °F (36.7 °C) 75 23 (!) 167/93 100 %   07/12/17 0327 - - - - 100 %   07/12/17 0325 98.4 °F (36.9 °C) 67 14 169/83 100 %   07/11/17 2228 - 63 22 (!) 140/98 -   07/11/17 2100 - 79 (!) 34 152/81 -   07/11/17 2052 - 66 26 (!) 191/91 -   07/11/17 1954 - 77 27 (!) 218/78 -   07/11/17 1943 - 76 28 (!) 221/195 -   07/11/17 1941 98.2 °F (36.8 °C) 84 21 (!) 211/88 99 %   07/11/17 1800 - 69 23 120/83 100 %   07/11/17 1600 97.2 °F (36.2 °C) 72 25 142/82 100 %       Intake/Output Summary (Last 24 hours) at 07/12/17 1509  Last data filed at 07/12/17 1416   Gross per 24 hour   Intake           1888.6 ml   Output             2225 ml   Net           -336.4 ml        PHYSICAL EXAM:  General: WD, WN. Alert, cooperative, no acute distress    EENT:  EOMI. Anicteric sclerae. MMM  Resp:  CTA bilaterally, no wheezing or rales. No accessory muscle use  CV:  Regular  rhythm,  No edema  GI:  Soft, Non distended, Non tender.  +Bowel sounds  Neurologic:  Alert and oriented X 3, shaky voice, tremulous   Psych:   Appears anxious  Skin:  No rashes. No jaundice    Reviewed most current lab test results and cultures  YES  Reviewed most current radiology test results   YES  Review and summation of old records today    NO  Reviewed patient's current orders and MAR    YES  PMH/SH reviewed - no change compared to H&P  ________________________________________________________________________  Care Plan discussed with:    Comments   Patient x    Family      RN x    Care Manager     Consultant  x Dr. Cristiana Camarena team rounds were held today with , nursing, pharmacist and clinical coordinator. Patient's plan of care was discussed; medications were reviewed and discharge planning was addressed.      ________________________________________________________________________  Total NON critical care TIME:  30   Minutes    Total CRITICAL CARE TIME Spent:   Minutes non procedure based      Comments   >50% of visit spent in counseling and coordination of care     ________________________________________________________________________  Jeromy Jack MD     Procedures: see electronic medical records for all procedures/Xrays and details which were not copied into this note but were reviewed prior to creation of Plan. LABS:  I reviewed today's most current labs and imaging studies.   Pertinent labs include:  Recent Labs      07/11/17   0432  07/10/17   0401   WBC  2.7*  2.9*   HGB  10.9*  10.1*   HCT  31.5*  29.9*   PLT  105*  160     Recent Labs      07/12/17   0355  07/11/17 0432  07/10/17   0401   NA  133*  133*  131*   K  3.4*  3.5  3.7   CL  99  102  97   CO2  21  19*  23   GLU  81  94  119*   BUN  44*  45*  39*   CREA  4.95*  8.83*  9.19*   CA  8.1*  7.4*  7.2*   MG  1.7  2.1  2.3   PHOS  3.0  4.0  5.0*   ALB  3.0*  2.8*  3.1*   TBILI  1.2*  1.3*  1.8*   SGOT  28  24  27   ALT  22  22  30   INR   --   1.0   --        Signed: Jeromy Jack MD

## 2017-07-12 NOTE — PROGRESS NOTES
TRANSFER - IN REPORT:    Verbal report received from Mayela(name) on Bravo Cruz  being received from CCU(unit) for routine progression of care      Report consisted of patients Situation, Background, Assessment and   Recommendations(SBAR). Information from the following report(s) SBAR, Kardex, Intake/Output, MAR, Accordion and Recent Results was reviewed with the receiving nurse. Opportunity for questions and clarification was provided. Assessment completed upon patients arrival to unit and care assumed.

## 2017-07-12 NOTE — PROGRESS NOTES
Attended Interdisciplinary rounds in Critical Care Unit, where patient care was discussed. Visit by: Frederick Dailey. Jarrod Azar.  Elton Flores MA, [address]

## 2017-07-12 NOTE — PROGRESS NOTES
Pt arrived on unit. After reviewing the chart, I called Dr. Bernice Patel to question appropriateness of placement. She requested a telemetry bed. Order was placed. Patient to be transferred to [facility] [phone]: report called to PCU. They will notify us when bed is ready. 1322: Bed is ready.  Will transfer patient

## 2017-07-12 NOTE — PROGRESS NOTES
TRANSFER - IN REPORT:    Verbal report received from Yuly(name) on Marimar Rho  being received from oncology(unit) for change in patient condition(requires higher level of care )      Report consisted of patients Situation, Background, Assessment and   Recommendations(SBAR). Information from the following report(s) SBAR, Kardex, Intake/Output, MAR and Recent Results was reveiwed with the receiving nurse. Opportunity for questions and clarification was provided. Assessment completed upon patients arrival to unit and care assumed.      Primary Nurse Kary Benton RN and Deb Dailey RN performed a dual skin assessment on this patient No impairment noted      Sitter at bedside  Pt with periods of confusion, agitation, hallucinations  Continue to monitor  CIWA/Ativan

## 2017-07-13 LAB
ALBUMIN SERPL BCP-MCNC: 3 G/DL (ref 3.5–5)
ALBUMIN/GLOB SERPL: 0.9 {RATIO} (ref 1.1–2.2)
ALP SERPL-CCNC: 46 U/L (ref 45–117)
ALT SERPL-CCNC: 24 U/L (ref 12–78)
ANION GAP BLD CALC-SCNC: 7 MMOL/L (ref 5–15)
AST SERPL W P-5'-P-CCNC: 32 U/L (ref 15–37)
BILIRUB DIRECT SERPL-MCNC: 0.3 MG/DL (ref 0–0.2)
BILIRUB SERPL-MCNC: 0.9 MG/DL (ref 0.2–1)
BUN SERPL-MCNC: 34 MG/DL (ref 6–20)
BUN/CREAT SERPL: 18 (ref 12–20)
CALCIUM SERPL-MCNC: 8.3 MG/DL (ref 8.5–10.1)
CHLORIDE SERPL-SCNC: 103 MMOL/L (ref 97–108)
CO2 SERPL-SCNC: 31 MMOL/L (ref 21–32)
CREAT SERPL-MCNC: 1.92 MG/DL (ref 0.7–1.3)
ERYTHROCYTE [DISTWIDTH] IN BLOOD BY AUTOMATED COUNT: 13.3 % (ref 11.5–14.5)
GLOBULIN SER CALC-MCNC: 3.3 G/DL (ref 2–4)
GLUCOSE SERPL-MCNC: 101 MG/DL (ref 65–100)
HCT VFR BLD AUTO: 32.3 % (ref 36.6–50.3)
HGB BLD-MCNC: 10.9 G/DL (ref 12.1–17)
MAGNESIUM SERPL-MCNC: 1.6 MG/DL (ref 1.6–2.4)
MCH RBC QN AUTO: 34.7 PG (ref 26–34)
MCHC RBC AUTO-ENTMCNC: 33.7 G/DL (ref 30–36.5)
MCV RBC AUTO: 102.9 FL (ref 80–99)
PHOSPHATE SERPL-MCNC: 3.4 MG/DL (ref 2.6–4.7)
PLATELET # BLD AUTO: 188 K/UL (ref 150–400)
POTASSIUM SERPL-SCNC: 3.1 MMOL/L (ref 3.5–5.1)
PROT SERPL-MCNC: 6.3 G/DL (ref 6.4–8.2)
RBC # BLD AUTO: 3.14 M/UL (ref 4.1–5.7)
SODIUM SERPL-SCNC: 141 MMOL/L (ref 136–145)
WBC # BLD AUTO: 3 K/UL (ref 4.1–11.1)

## 2017-07-13 PROCEDURE — 84100 ASSAY OF PHOSPHORUS: CPT | Performed by: INTERNAL MEDICINE

## 2017-07-13 PROCEDURE — 74011250636 HC RX REV CODE- 250/636: Performed by: INTERNAL MEDICINE

## 2017-07-13 PROCEDURE — 74011250637 HC RX REV CODE- 250/637: Performed by: INTERNAL MEDICINE

## 2017-07-13 PROCEDURE — 83735 ASSAY OF MAGNESIUM: CPT | Performed by: INTERNAL MEDICINE

## 2017-07-13 PROCEDURE — 65620000000 HC RM CCU GENERAL

## 2017-07-13 PROCEDURE — 74011000250 HC RX REV CODE- 250: Performed by: INTERNAL MEDICINE

## 2017-07-13 PROCEDURE — 80076 HEPATIC FUNCTION PANEL: CPT | Performed by: INTERNAL MEDICINE

## 2017-07-13 PROCEDURE — 36415 COLL VENOUS BLD VENIPUNCTURE: CPT | Performed by: INTERNAL MEDICINE

## 2017-07-13 PROCEDURE — 74011000258 HC RX REV CODE- 258: Performed by: INTERNAL MEDICINE

## 2017-07-13 PROCEDURE — 85027 COMPLETE CBC AUTOMATED: CPT | Performed by: INTERNAL MEDICINE

## 2017-07-13 PROCEDURE — 77030037878 HC DRSG MEPILEX >48IN BORD MOLN -B

## 2017-07-13 PROCEDURE — 77030013160 HC PROTCT ARM THMB DERY -A

## 2017-07-13 PROCEDURE — 77030010547 HC BG URIN W/UMETER -A

## 2017-07-13 PROCEDURE — 77010033678 HC OXYGEN DAILY

## 2017-07-13 PROCEDURE — 80048 BASIC METABOLIC PNL TOTAL CA: CPT | Performed by: INTERNAL MEDICINE

## 2017-07-13 RX ORDER — LORAZEPAM 2 MG/ML
2-5 INJECTION INTRAMUSCULAR
Status: DISCONTINUED | OUTPATIENT
Start: 2017-07-13 | End: 2017-07-16 | Stop reason: HOSPADM

## 2017-07-13 RX ORDER — POTASSIUM CHLORIDE 20 MEQ/1
40 TABLET, EXTENDED RELEASE ORAL
Status: DISCONTINUED | OUTPATIENT
Start: 2017-07-13 | End: 2017-07-13

## 2017-07-13 RX ORDER — SODIUM CHLORIDE 450 MG/100ML
75 INJECTION, SOLUTION INTRAVENOUS CONTINUOUS
Status: DISCONTINUED | OUTPATIENT
Start: 2017-07-13 | End: 2017-07-14

## 2017-07-13 RX ORDER — POTASSIUM CHLORIDE 750 MG/1
10 TABLET, FILM COATED, EXTENDED RELEASE ORAL
Status: COMPLETED | OUTPATIENT
Start: 2017-07-13 | End: 2017-07-13

## 2017-07-13 RX ORDER — POTASSIUM CHLORIDE 7.45 MG/ML
10 INJECTION INTRAVENOUS
Status: DISPENSED | OUTPATIENT
Start: 2017-07-13 | End: 2017-07-13

## 2017-07-13 RX ORDER — HYDRALAZINE HYDROCHLORIDE 20 MG/ML
20 INJECTION INTRAMUSCULAR; INTRAVENOUS
Status: DISCONTINUED | OUTPATIENT
Start: 2017-07-13 | End: 2017-07-16 | Stop reason: HOSPADM

## 2017-07-13 RX ADMIN — LORAZEPAM 2 MG: 2 INJECTION INTRAMUSCULAR; INTRAVENOUS at 21:20

## 2017-07-13 RX ADMIN — SODIUM CHLORIDE 0.5 MCG/KG/HR: 900 INJECTION, SOLUTION INTRAVENOUS at 08:45

## 2017-07-13 RX ADMIN — POTASSIUM CHLORIDE 10 MEQ: 750 TABLET, FILM COATED, EXTENDED RELEASE ORAL at 17:01

## 2017-07-13 RX ADMIN — POTASSIUM CHLORIDE 10 MEQ: 10 INJECTION, SOLUTION INTRAVENOUS at 14:07

## 2017-07-13 RX ADMIN — SODIUM CHLORIDE 75 ML/HR: 450 INJECTION, SOLUTION INTRAVENOUS at 11:35

## 2017-07-13 RX ADMIN — Medication 10 ML: at 06:44

## 2017-07-13 RX ADMIN — MUPIROCIN: 20 OINTMENT TOPICAL at 17:01

## 2017-07-13 RX ADMIN — SODIUM CHLORIDE 1 MCG/KG/HR: 900 INJECTION, SOLUTION INTRAVENOUS at 04:17

## 2017-07-13 RX ADMIN — MUPIROCIN: 20 OINTMENT TOPICAL at 09:34

## 2017-07-13 RX ADMIN — LORAZEPAM 2 MG: 1 TABLET ORAL at 16:57

## 2017-07-13 RX ADMIN — LORAZEPAM 2 MG: 2 INJECTION INTRAMUSCULAR; INTRAVENOUS at 14:26

## 2017-07-13 RX ADMIN — LORAZEPAM 2 MG: 2 INJECTION INTRAMUSCULAR; INTRAVENOUS at 04:06

## 2017-07-13 RX ADMIN — LORAZEPAM 2 MG: 2 INJECTION INTRAMUSCULAR; INTRAVENOUS at 23:26

## 2017-07-13 RX ADMIN — LORAZEPAM 1 MG: 2 INJECTION INTRAMUSCULAR; INTRAVENOUS at 04:17

## 2017-07-13 RX ADMIN — WATER: 1000 INJECTION, SOLUTION INTRAVENOUS at 04:00

## 2017-07-13 RX ADMIN — POTASSIUM CHLORIDE 10 MEQ: 10 INJECTION, SOLUTION INTRAVENOUS at 12:46

## 2017-07-13 RX ADMIN — Medication 10 ML: at 21:21

## 2017-07-13 RX ADMIN — Medication 10 ML: at 14:08

## 2017-07-13 RX ADMIN — POTASSIUM CHLORIDE 10 MEQ: 10 INJECTION, SOLUTION INTRAVENOUS at 15:18

## 2017-07-13 NOTE — PROGRESS NOTES
Pt is a 59 yo male admitted from home for evaluation/treatment of DT's and acute renal failure. Pt has hx of previous CVA, HTN and chronic alcoholism with hx of withdrawal seizures. Pt showed some improvement and was transferred to PCU but had to be brought back to CCU for management of increased agitation - has required restraints. Pt lives w/ his wife in a 2-story home, is retired from [employer] and was apparently independent for mobility and ADLs pta. Pt's PCP is listed at [facility] but no history of f/u could be found in CC - unable to reach wife. CM will remain avbl to assist if pt has dc needs.     Care Management Interventions  PCP Verified by CM: No (Not able to speak w/ wife)  Transition of Care Consult (CM Consult):  (Pt was assessed for possible dc needs)  Physical Therapy Consult: No  Occupational Therapy Consult: No  Speech Therapy Consult: No  Current Support Network: Lives with Spouse  Discharge Location  Discharge Placement: Home with outpatient services     Zenaida Chris MSW

## 2017-07-13 NOTE — PROGRESS NOTES
Progress Note    NAME: Emil Sarmiento   :  1955   MRN:  [id number]     Date/Time:  2017 PM       Assessment :    Plan:  CLAUDIA  Etoh use  Etoh liver dz  HTN  Thrombocytopenia-improving   Creatinine 0.81 in . Now improved to 1.9  Repleted K  Continue uloric for hyperuricemia  Back in icu with sedation (precedex)  As his renal failure is improving, I will see again upon request       Subjective:   CHIEF COMPLAINT: sedated now- (has sitter x 2 days now)      Past Medical History:   Diagnosis Date    [facility]ism ([facility] [address]     Hypertension     Ill-defined condition     Hernia     Seizures ([facility] [place] [address]     Withdrawal       Past Surgical History:   Procedure Laterality Date    HX HERNIA REPAIR       Social History   Substance Use Topics    Smoking status: Never Smoker    Smokeless tobacco: Never Used    Alcohol use Yes      Comment: - pint vodka/day +/- beer      Family History   Problem Relation Age of Onset    Hypertension Other     Breast Cancer Other       Allergies   Allergen Reactions    Lisinopril Angioedema      Prior to Admission medications    Medication Sig Start Date End Date Taking? Authorizing Provider   atenolol (TENORMIN) 25 mg tablet Take 25 mg by mouth daily. Yes Debi Cruz MD   lidocaine (LIDOCAINE VISCOUS) 2 % solution Take 15 mL by mouth three (3) times daily as needed for Pain. 17   Yomi Bloom MD   guaiFENesin ER (MUCINEX) 600 mg ER tablet Take 1 Tab by mouth two (2) times daily as needed for Congestion.  17   Yomi Bloom MD   OTHER Indications: pt. taking blood pressure medication but doesn't know the name of it    Phys MD Anthony     REVIEW OF SYSTEMS:    Unable to give    Objective:   VITALS:    Visit Vitals    /90    Pulse (!) 57    Temp 97.7 °F (36.5 °C)    Resp 25    Ht 5' 9\" (1.753 m)    Wt 80.8 kg (178 lb 2.1 oz)    SpO2 98%    BMI 26.31 kg/m2     PHYSICAL EXAM:  Gen:  []  WD []  WN  [] cachectic [x]  thin []  obese []  disheveled []  ill apearing  []   Critical  [x]   Chronic    [x]  No acute distress--sedated  HEENT:   [] NC/AT/PERRLA/EOMI    [] pink conjunctivae      [] pale conjunctivae                  PERRL  [] yes  [] no      [] moist mucosa    [] dry mucosa    hearing intact to voice [] yes  [] No                 NECK:   supple [] yes  [] no        masses [] yes  [] No               thyroid  []  non tender  []  tender    RESP:   [x] CTA bilaterally/no wheezing/rhonchi/rales/crackles    [] rhonchi bilaterally - no dullness  [] wheezing   [] rhonchi   [] crackles     use of accessory muscles [] yes [x] no    CARD:   [x]  regular rate and rhythm/No murmurs/rubs/gallops    murmur  [] yes ()  [] no      Rubs  [] yes  [] no       Gallops [] yes  [] no    Rate []  regular  []  irregular        carotid bruits  [] Right  []  Left                 LE edema [] yes  [x] no           JVP  []  yes   []  no    ABD:    [x] soft/non distended/non tender/+bowel sounds/    []  Rigid    tenderness [] yes [] no   Liver enlargement  []  yes []  no                Spleen enlargement  []  yes []  no     distended []  yes [] no     bowel sound  [] hypoactive   [] hyperactive    LYMPH:    Neck []  yes []  no       Axillae []  yes []  no    SKIN:   Rashes []  yes   [x]  no    Ulcers []  yes   []  no               [] tight to palpitation    skin turgor []  good  [] poor  [] decreased               Cyanosis/clubbing []  yes []  no    NEUR:   [x] cranial nerves II-XII grossly intact       [] Cranial nerves deficit                 []  facial droop    []  slurred speech   [] aphasic     [] Strength normal     []  weakness  []  LUE  []   RUE/ []  LLE  []   RLE    follows commands  [x]  yes []  no           PSYCH:   insight [] poor [] good   Alert and Oriented to  [] person  [] place  []  time                    [] depressed [] anxious [] agitated  [] lethargic [] stuporous  [] sedated     LAB DATA REVIEWED:    Recent Labs      07/13/17   0450  07/11/17   0976 WBC  3.0*  2.7*   HGB  10.9*  10.9*   HCT  32.3*  31.5*   PLT  188  105*     Recent Labs      07/13/17 0614 07/12/17 0355 07/11/17 0432   NA  141  133*  133*   K  3.1*  3.4*  3.5   CL  103  99  102   CO2  31  21  19*   BUN  34*  44*  45*   CREA  1.92*  4.95*  8.83*   GLU  101*  81  94   CA  8.3*  8.1*  7.4*   MG  1.6  1.7  2.1   PHOS  3.4  3.0  4.0   URICA   --    --   10.0*     Recent Labs      07/13/17 0614 07/12/17 0355 07/11/17 0432   SGOT  32  28  24   ALT  24  22  22   AP  46  51  45   TBILI  0.9  1.2*  1.3*   ALB  3.0*  3.0*  2.8*   GLOB  3.3  3.6  3.3     Recent Labs      07/11/17 0432   INR  1.0   PTP  10.1      No results for input(s): FE, TIBC, PSAT, FERR in the last 72 hours. No results for input(s): PH, PCO2, PO2 in the last 72 hours. No results for input(s): CPK, CKMB in the last 72 hours.     No lab exists for component: TROPONINI  Lab Results   Component Value Date/Time    Glucose (POC) 84 12/03/2015 09:34 AM       Procedures: see electronic medical records for all procedures/Xrays and details which were not copied into this note but were reviewed prior to creation of Plan.    ________________________________________________________________________       ___________________________________________________  Consulting Physician: Tammie Rogers MD

## 2017-07-13 NOTE — PROGRESS NOTES
[id number]  AM assessment complete. Pt lethargic and extremities floppy. Precedex decreased to 0.5  [id number]  Pt incontinent of urine and stool. Pt bathed and awakened confused, agitated and restless. Wrist restraints in place. Attempted to calm pt verbally although unsuccessful. Pt cursing and trying to get up. Continued to verbally and physically redirect pt to lie down. Once pt linen changed and stimulation stopped. Pt went back to sleep. Sitter at bedside. Attempted to place condom cath, although when pt voided, catheter came off.  1130  Pt having significant bradycardia and still lethargic, although pt will follow commands with eyes closed. Precedex weaned to 0.2mcg  1145  Precedex stopped due to HR 38-42. Called Dr. Russell Quiroz to notify. 1445  Pt awake and oriented to person place and time. Pt still very tremulous and restless. Pt attempting to \"get out of here\". Assisted pt in using the urinal and calling his wife. Pt currently redirectable.   Wrist restraints removed

## 2017-07-13 NOTE — PROGRESS NOTES
Pressure Ulcer Prevention In basket Alert Received for Robinson < 14 (moderate risk).      Suggested Care Plan/Interventions for Nursing  1. Complete Robinson Pressure Ulcer Risk Scale and use sub scores to identify appropriate interventions. 2. Perform Assessment: skin, changes in LOC, visual cues for pain, monitor skin under medical devices  3. Respond to Reduced Sensory Perception: changes in LOC, check visual cues for pain, float heels, suspension boots, pressure redistribution bed/mattress/chair cushion, turning and reposition approximately every 2 hours (pillows & wedges), pad between skin to skin, turn & reposition  4. Manage Moisture: absorbent under pads, internal / external urinary device, internal /  external fecal device, minimize layers, contain wound drainage, access need for specialty bed, limit adult briefs, maintain skin hydration (lotion/cream), moisture barrier, offer toileting every hour  5. Promote Activity: increase time out of bed, chair cushion, PT/OT evaluation, trapeze to reposition, pressure redistribution bed/mattress/chair  6. Address Reduced Mobility: float heels / suspension boot, HOB 30 degrees or less, pressure redistribution bed/mattress/cushion, PT / OT evaluation, turn and reposition approximately every 2 hours (pillows & wedges)  7. Promote Nutrition: document food / fluid / supplement intake, encourage/assist with meals as needed  8. Reduce Friction and Shear: transferring/repositioning devices (lift/draw sheet), lift team/ patient mobility team, feet elevated on foot rest, minimize layers, foam dressing / transparent film / skin sealants, protective barrier creams and emollients, transfer aides (board, Radha lift, ceiling lift, stand assist), HOB 30 degrees or less, trapeze to reposition.   Wound Care Team

## 2017-07-13 NOTE — PROGRESS NOTES
5310 Dr Roper Grew paged about patient's incoming transfer, DTs and agitation. Precedex gtt ordered. Pharmacy called to prepare gtt.

## 2017-07-13 NOTE — PROGRESS NOTES
Hospitalist Progress Note    NAME: Xochilt Friend   :  1955   MRN:  [id number]       Assessment / Plan:  Acute renal failure, improving  Hyponatremia  -? Pre-renal  -renal US wnl  -cont' IVF  -hold nephrotoxic agents  -nephro following    UTI  -cont' rocephin  -f/u urine cx-mixed sunny    Elevated Tbili  Hepatic steatosis in the setting of chronic alcoholism  -liver US 7/10/17 Hepatic steatosis, small right hepatic mass. Gallstones. -follow labs. -  Ct abdomen 17 fat-containing umbilical hernia. Hepatic steatosis. There is a high attenuation, rounded lesion in the liver that measures approximately 0.8 cm, likely not significantly changed since . Alcohol abuse  -at risk for DT, transferred back to ICU,   -CIWA,  precedex stopped dt bradycardia  -folic acid, MVI, thiamine    HTN  -cont' atenolol    Hx of CVA  -old CT head in  showed lacunar infarct involving the basal ganglia. Pt was not aware of such finding.   -start ASA when renal fxn recovers    Neutropenia,   likely secondary to alcohol, monitor    Hypokalemia, replete        Body mass index is 26.31 kg/(m^2). Code status: Full  Prophylaxis: SCDs  Recommended Disposition: Home w/Family     Subjective:     Chief Complaint / Reason for Physician Visit  Pt seen at bedside. No acute complaints. Back in ICU  Discussed with RN events overnight. Review of Systems:  Symptom Y/N Comments  Symptom Y/N Comments   Fever/Chills n   Chest Pain n    Poor Appetite    Edema     Cough    Abdominal Pain n    Sputum    Joint Pain     SOB/AMEZQUITA n   Pruritis/Rash     Nausea/vomit n   Tolerating PT/OT     Diarrhea    Tolerating Diet     Constipation    Other y tremulous     Could NOT obtain due to:      Objective:     VITALS:   Last 24hrs VS reviewed since prior progress note.  Most recent are:  Patient Vitals for the past 24 hrs:   Temp Pulse Resp BP SpO2   17 1200 96.2 °F (35.7 °C) (!) 44 13 (!) 163/92 98 %   17 1140 - (!) 40 19 - 99 % 07/13/17 1100 - (!) 44 21 170/89 98 %   07/13/17 1000 - (!) 49 19 155/88 97 %   07/13/17 0900 - 92 20 (!) 133/93 (!) 68 %   07/13/17 0800 97.7 °F (36.5 °C) (!) 56 23 164/88 99 %   07/13/17 0700 - (!) 57 25 167/90 98 %   07/13/17 0623 - - - - 97 %   07/13/17 0600 - 71 22 (!) 159/92 96 %   07/13/17 0500 - 63 26 147/80 96 %   07/13/17 0428 - (!) 56 28 (!) 171/93 97 %   07/13/17 0400 97.7 °F (36.5 °C) (!) 109 24 (!) 198/97 90 %   07/13/17 0300 - 91 26 125/88 95 %   07/13/17 0200 - 71 25 138/90 95 %   07/13/17 0100 - 82 20 (!) 160/92 98 %   07/13/17 0055 98.8 °F (37.1 °C) 88 20 (!) 154/95 -   07/12/17 2342 97.7 °F (36.5 °C) 65 18 (!) 137/92 -   07/12/17 1926 98.4 °F (36.9 °C) 82 18 (!) 162/112 100 %   07/12/17 1641 98 °F (36.7 °C) 75 18 (!) 160/103 99 %       Intake/Output Summary (Last 24 hours) at 07/13/17 1455  Last data filed at 07/13/17 1429   Gross per 24 hour   Intake           243.32 ml   Output             1900 ml   Net         -1656.68 ml        PHYSICAL EXAM:  General: WD, WN. Alert, cooperative, no acute distress    EENT:  EOMI. Anicteric sclerae. MMM  Resp:  CTA bilaterally, no wheezing or rales. No accessory muscle use  CV:  Regular  rhythm,  No edema  GI:  Soft, Non distended, Non tender.  +Bowel sounds  Neurologic:  Alert and oriented X 3, shaky voice, tremulous   Psych:   Appears anxious  Skin:  No rashes. No jaundice    Reviewed most current lab test results and cultures  YES  Reviewed most current radiology test results   YES  Review and summation of old records today    NO  Reviewed patient's current orders and MAR    YES  PMH/SH reviewed - no change compared to H&P  ________________________________________________________________________  Care Plan discussed with:    Comments   Patient x    Family      RN x    Care Manager     Consultant  x Dr. Janiya Monreal team rounds were held today with , nursing, pharmacist and clinical coordinator.   Patient's plan of care was discussed; medications were reviewed and discharge planning was addressed. ________________________________________________________________________  Total NON critical care TIME:  30   Minutes    Total CRITICAL CARE TIME Spent:   Minutes non procedure based      Comments   >50% of visit spent in counseling and coordination of care     ________________________________________________________________________  Asim Silverman MD     Procedures: see electronic medical records for all procedures/Xrays and details which were not copied into this note but were reviewed prior to creation of Plan. LABS:  I reviewed today's most current labs and imaging studies.   Pertinent labs include:  Recent Labs      07/13/17   0453  07/11/17   0432   WBC  3.0*  2.7*   HGB  10.9*  10.9*   HCT  32.3*  31.5*   PLT  188  105*     Recent Labs      07/13/17   0614  07/12/17   0355  07/11/17   0432   NA  141  133*  133*   K  3.1*  3.4*  3.5   CL  103  99  102   CO2  31  21  19*   GLU  101*  81  94   BUN  34*  44*  45*   CREA  1.92*  4.95*  8.83*   CA  8.3*  8.1*  7.4*   MG  1.6  1.7  2.1   PHOS  3.4  3.0  4.0   ALB  3.0*  3.0*  2.8*   TBILI  0.9  1.2*  1.3*   SGOT  32  28  24   ALT  24  22  22   INR   --    --   1.0       Signed: Asim Silverman MD

## 2017-07-13 NOTE — PROGRESS NOTES
Interdisciplinary team rounds were held 7/13/2017  with the following team members:Care Management, Diabetes Treatment Specialist, Nursing, Nutrition, Pastoral Care, Pharmacy and Respiratory Therapy. Plan of care discussed. Goal: See MD orders and progress notes for further  interventions and desired outcomes.

## 2017-07-13 NOTE — PROGRESS NOTES
0100- Primary Nurse Bety Day RN and Andrey Kenyon RN performed a dual skin assessment on this patient No impairment noted  Robinson score is 14    0120- Patient does not have IV access, multiple nurses attempted. Patient still without IV access, nursing supervisor aware. Someone to come attempt IV.     0700- Report to [facility].

## 2017-07-13 NOTE — PROGRESS NOTES
7/13/2017    INTENSIVIST PROGRESS NOTE:     Patient seen and evaluated   57 yo male admitted with DT's and acute renal failure  Started on precedex drip, now off  Resting comfortable  No acute complaints   No agitation     Visit Vitals    /90    Pulse (!) 57    Temp 97.7 °F (36.5 °C)    Resp 25    Ht 5' 9\" (1.753 m)    Wt 80.8 kg (178 lb 2.1 oz)    SpO2 98%    BMI 26.31 kg/m2       General: no distress, very sleepy when seen this am.   CV: RRR nl s1,2  Abd: +Bs, Soft, nt, nd  Lungs: clear  Extrem:NO C, C, E  Neuro: Nonfocal. Sleepy. Mental status waxes and wanes from sleepy to somewhat agitated. 2.7>10.9<105  K: 3.1  Cr: down to 1.92        A/P:  Acute Alcohol withdrawal  Hepatic Steatosis  Acute Renal Failure-nephrology is following  Hyponatremia  UTI on CTX  Discussed with Dr. Ekaterina Harrison, pharm, Renal and nursing.        Risk of harming himself, in restraints of bilateral wrist.  ON precedex  Ativan scheduled and prn  Renal fx better  Renal following  Advance diet  Mobilize   Will assist on disposition planning, transfer to floor, day 2 awaiting for a bed  Farheen Weller MD

## 2017-07-14 LAB
ALBUMIN SERPL BCP-MCNC: 2.8 G/DL (ref 3.5–5)
ALBUMIN/GLOB SERPL: 0.8 {RATIO} (ref 1.1–2.2)
ALP SERPL-CCNC: 50 U/L (ref 45–117)
ALT SERPL-CCNC: 23 U/L (ref 12–78)
ANION GAP BLD CALC-SCNC: 10 MMOL/L (ref 5–15)
AST SERPL W P-5'-P-CCNC: 20 U/L (ref 15–37)
BILIRUB SERPL-MCNC: 0.6 MG/DL (ref 0.2–1)
BUN SERPL-MCNC: 21 MG/DL (ref 6–20)
BUN/CREAT SERPL: 19 (ref 12–20)
CALCIUM SERPL-MCNC: 8.4 MG/DL (ref 8.5–10.1)
CHLORIDE SERPL-SCNC: 101 MMOL/L (ref 97–108)
CO2 SERPL-SCNC: 27 MMOL/L (ref 21–32)
CREAT SERPL-MCNC: 1.11 MG/DL (ref 0.7–1.3)
ERYTHROCYTE [DISTWIDTH] IN BLOOD BY AUTOMATED COUNT: 12.8 % (ref 11.5–14.5)
GLOBULIN SER CALC-MCNC: 3.5 G/DL (ref 2–4)
GLUCOSE SERPL-MCNC: 96 MG/DL (ref 65–100)
HCT VFR BLD AUTO: 32.5 % (ref 36.6–50.3)
HGB BLD-MCNC: 10.9 G/DL (ref 12.1–17)
MAGNESIUM SERPL-MCNC: 1.5 MG/DL (ref 1.6–2.4)
MCH RBC QN AUTO: 34.9 PG (ref 26–34)
MCHC RBC AUTO-ENTMCNC: 33.5 G/DL (ref 30–36.5)
MCV RBC AUTO: 104.2 FL (ref 80–99)
PHOSPHATE SERPL-MCNC: 3 MG/DL (ref 2.6–4.7)
PLATELET # BLD AUTO: 173 K/UL (ref 150–400)
POTASSIUM SERPL-SCNC: 3.7 MMOL/L (ref 3.5–5.1)
PROT SERPL-MCNC: 6.3 G/DL (ref 6.4–8.2)
RBC # BLD AUTO: 3.12 M/UL (ref 4.1–5.7)
SODIUM SERPL-SCNC: 138 MMOL/L (ref 136–145)
WBC # BLD AUTO: 3.1 K/UL (ref 4.1–11.1)

## 2017-07-14 PROCEDURE — 74011250637 HC RX REV CODE- 250/637: Performed by: INTERNAL MEDICINE

## 2017-07-14 PROCEDURE — 65620000000 HC RM CCU GENERAL

## 2017-07-14 PROCEDURE — 85027 COMPLETE CBC AUTOMATED: CPT | Performed by: INTERNAL MEDICINE

## 2017-07-14 PROCEDURE — 84100 ASSAY OF PHOSPHORUS: CPT | Performed by: INTERNAL MEDICINE

## 2017-07-14 PROCEDURE — 77010033678 HC OXYGEN DAILY

## 2017-07-14 PROCEDURE — 65660000000 HC RM CCU STEPDOWN

## 2017-07-14 PROCEDURE — 74011250636 HC RX REV CODE- 250/636: Performed by: INTERNAL MEDICINE

## 2017-07-14 PROCEDURE — 80053 COMPREHEN METABOLIC PANEL: CPT | Performed by: INTERNAL MEDICINE

## 2017-07-14 PROCEDURE — 83735 ASSAY OF MAGNESIUM: CPT | Performed by: INTERNAL MEDICINE

## 2017-07-14 PROCEDURE — 36415 COLL VENOUS BLD VENIPUNCTURE: CPT | Performed by: INTERNAL MEDICINE

## 2017-07-14 RX ORDER — LORAZEPAM 2 MG/ML
1 INJECTION INTRAMUSCULAR EVERY 8 HOURS
Status: DISCONTINUED | OUTPATIENT
Start: 2017-07-14 | End: 2017-07-15

## 2017-07-14 RX ORDER — GUAIFENESIN 100 MG/5ML
81 LIQUID (ML) ORAL DAILY
Status: DISCONTINUED | OUTPATIENT
Start: 2017-07-14 | End: 2017-07-16 | Stop reason: HOSPADM

## 2017-07-14 RX ADMIN — HYDRALAZINE HYDROCHLORIDE 20 MG: 20 INJECTION INTRAMUSCULAR; INTRAVENOUS at 22:08

## 2017-07-14 RX ADMIN — THERA TABS 1 TABLET: TAB at 08:12

## 2017-07-14 RX ADMIN — ASPIRIN 81 MG 81 MG: 81 TABLET ORAL at 09:55

## 2017-07-14 RX ADMIN — LORAZEPAM 4 MG: 2 INJECTION INTRAMUSCULAR; INTRAVENOUS at 22:49

## 2017-07-14 RX ADMIN — Medication 100 MG: at 08:12

## 2017-07-14 RX ADMIN — LORAZEPAM 4 MG: 2 INJECTION INTRAMUSCULAR; INTRAVENOUS at 01:59

## 2017-07-14 RX ADMIN — FEBUXOSTAT 40 MG: 40 TABLET ORAL at 08:15

## 2017-07-14 RX ADMIN — LORAZEPAM 1 MG: 2 INJECTION INTRAMUSCULAR; INTRAVENOUS at 13:34

## 2017-07-14 RX ADMIN — Medication 10 ML: at 13:35

## 2017-07-14 RX ADMIN — Medication 10 ML: at 06:23

## 2017-07-14 RX ADMIN — FOLIC ACID 1 MG: 1 TABLET ORAL at 08:12

## 2017-07-14 RX ADMIN — LORAZEPAM 2 MG: 2 INJECTION INTRAMUSCULAR; INTRAVENOUS at 06:23

## 2017-07-14 RX ADMIN — MUPIROCIN: 20 OINTMENT TOPICAL at 17:02

## 2017-07-14 RX ADMIN — Medication 10 ML: at 22:00

## 2017-07-14 RX ADMIN — LORAZEPAM 1 MG: 2 INJECTION INTRAMUSCULAR; INTRAVENOUS at 21:10

## 2017-07-14 RX ADMIN — MUPIROCIN: 20 OINTMENT TOPICAL at 08:16

## 2017-07-14 RX ADMIN — LORAZEPAM 2 MG: 1 TABLET ORAL at 00:00

## 2017-07-14 NOTE — INTERDISCIPLINARY ROUNDS
Interdisciplinary team rounds were held 7/14/2017 with the following team members:Care Management, Diabetes Treatment Specialist, Nursing, Nutrition, Pharmacy, Physician and Respiratory Therapy    Plan of care discussed. See clinical pathway and/or care plan for interventions and desired outcomes.

## 2017-07-14 NOTE — PROGRESS NOTES
21:30  Restless, schedulled Ativan given. 23:00 oral ativan given for preventative .  23;30 Becoming agitated after incontinent episode. Ativan IV given  06:30  Appears more oriented. Talking appropriately to wife on the phone. Knows the day of the week and why he is here. Remains tremulous and slightly anxious but more cooperative. Schedulled Ativan 2mg IVP given.

## 2017-07-14 NOTE — PROGRESS NOTES
Nutrition Assessment:    RECOMMENDATIONS:   RD to liberalize diet to Cardiac     DIETITIANS INTERVENTIONS/PLAN:   Liberalize diet to Cardiac  Monitor appetite/PO intake  Monitor K+ and phos    ASSESSMENT:   Pt admitted with ARF. PMH: ETOH abuse, HTN, hepatic steatosis, CVA. Chart reviewed, case discussed during CCU rounds. Pt now awake and alert, less agitation. ARF appears to have resolved and K+ required repletion yesterday. Phos WNL also. No need for further renal restricted diet. His appetite is good. BM noted on 7/13. No skin breakdown. Current intake is likely adequate to meet est needs. SUBJECTIVE/OBJECTIVE:     Diet Order: Renal  % Eaten:  Patient Vitals for the past 72 hrs:   % Diet Eaten   07/13/17 1917 100 %     Pertinent Medications:folvite, MVI, thiamin. Chemistries:  Lab Results   Component Value Date/Time    Sodium 138 07/14/2017 06:16 AM    Potassium 3.7 07/14/2017 06:16 AM    Chloride 101 07/14/2017 06:16 AM    CO2 27 07/14/2017 06:16 AM    Anion gap 10 07/14/2017 06:16 AM    Glucose 96 07/14/2017 06:16 AM    BUN 21 07/14/2017 06:16 AM    Creatinine 1.11 07/14/2017 06:16 AM    BUN/Creatinine ratio 19 07/14/2017 06:16 AM    GFR est AA >60 07/14/2017 06:16 AM    GFR est non-AA >60 07/14/2017 06:16 AM    Calcium 8.4 07/14/2017 06:16 AM    AST (SGOT) 20 07/14/2017 06:16 AM    Alk. phosphatase 50 07/14/2017 06:16 AM    Protein, total 6.3 07/14/2017 06:16 AM    Albumin 2.8 07/14/2017 06:16 AM    Globulin 3.5 07/14/2017 06:16 AM    A-G Ratio 0.8 07/14/2017 06:16 AM    ALT (SGPT) 23 07/14/2017 06:16 AM      Anthropometrics: Height: 5' 9\" (175.3 cm) Weight: 80.8 kg (178 lb 2.1 oz)    IBW (%IBW):   ( ) UBW (%UBW):   (  %)    BMI: Body mass index is 26.31 kg/(m^2).     This BMI is indicative of:  []Underweight   [x]Normal(for age)   []Overweight   [] Obesity   [] Extreme Obesity (BMI>40)  Estimated Nutrition Needs (Based on): 2079 Kcals/day (MSJ 1599 x 1.3) , 65 g (-81 (0.8-1gPro/kg)) Protein  Carbohydrate: At Least 130 g/day  Fluids: 2000 mL/day    Last BM: 7/13   []Active     []Hyperactive  [x]Hypoactive       [] Absent   BS  Skin:    [x] Intact   [] Incision  [] Breakdown   [] DTI   [] Tears/Excoriation/Abrasion  []Edema [] Other: Wt Readings from Last 30 Encounters:   07/14/17 80.8 kg (178 lb 2.1 oz)   02/01/17 78.8 kg (173 lb 11.6 oz)   01/24/17 79.2 kg (174 lb 9.7 oz)   11/03/16 73.9 kg (163 lb)   09/23/16 81.3 kg (179 lb 3.7 oz)   07/11/16 83.4 kg (183 lb 13.8 oz)   12/08/15 71.4 kg (157 lb 6.5 oz)   07/30/14 79.7 kg (175 lb 11.3 oz)   12/20/13 78 kg (172 lb)   12/29/12 82.8 kg (182 lb 8.7 oz)   01/04/12 73.2 kg (161 lb 6 oz)   05/25/10 78 kg (172 lb)      NUTRITION DIAGNOSES:   Problem:       No nutritional dx at this time. NUTRITION INTERVENTIONS:  Meals/Snacks: Modify diet/texture/consistency/nutrients                  GOAL:   Pt will consume >50% of meals with K+ and phos WNL in 4-6 days.      Cultural, Taoism, or Ethnic Dietary Needs: None     LEARNING NEEDS (Diet, Food/Nutrient-Drug Interaction):    [x] None Identified   [] Identified and Education Provided/Documented   [] Identified and Pt declined/was not appropriate      [x] Interdisciplinary Care Plan Reviewed/Documented    [x] Participated in Discharge Planning: Cardiac diet    [x] Interdisciplinary Rounds     NUTRITION RISK:    [] High              [] Moderate           [x]  Low  []  Minimal/Uncompromised    PT SEEN FOR:    []  MD Consult: []Calorie Count      []Diabetic Diet Education        []Diet Education     []Electrolyte Management     []General Nutrition Management and Supplements     []Management of Tube Feeding     []TPN Recommendations    []  RN Referral:  []MST score >=2     []Enteral/Parenteral Nutrition PTA     []Pregnant: Gestational DM or Multigestation   []  Low BMI  []  DTR Referral   LOS Ned Hammans, RD, [address]    Pager [phone]  Weekend Pager [phone]

## 2017-07-14 NOTE — PROGRESS NOTES
2000:  Got out of bed to use room commode, needed on person assist, HR got as high as 155, and RR as high as 33. Pt had no light headedness or any other adverse symptoms besides HR and RR. Sitter able to get pt from commode back into bed with no difficulties. 2249:  Pt HR in 120's with a short burst increase into the 140's. Called Dr. Tonia Jimenez, received order for 500ml NS bolus. Bolus administered, pt HR in low 100's.  0245:  Pt went into a HR of 140. At 0247 Pt HR decreased to 109. Will continue to monitor.

## 2017-07-14 NOTE — PROGRESS NOTES
Spiritual Care Assessment/Progress Notes    Marimar Morgan [id number]  [id number]    1955  58 y.o.  male    Patient Telephone Number: [phone] (home)   Samaritan Affiliation: No Religious   Language: English   Extended Emergency Contact Information  Primary Emergency Contact: Trisha Celaya  Address: [address] [place] [address] Phone: [phone]  Work Phone: [phone]  Relation: Spouse   Patient Active Problem List    Diagnosis Date Noted    Chronic alcoholism ([facility] [place] [address] 07/09/2017    ARF (acute renal failure) ([facility] [place] [address] 07/09/2017    HTN (hypertension) 07/09/2017     Class: Chronic    Hepatic steatosis 07/09/2017     Class: Chronic    History of CVA (cerebrovascular accident) 07/09/2017     Class: Present on Admission    UTI (urinary tract infection) 07/09/2017     Class: Present on Admission        Date: 7/14/2017       Level of Samaritan/Spiritual Activity:  [x]         Involved in seth tradition/spiritual practice    []         Not involved in seth tradition/spiritual practice  [x]         Spiritually oriented    []         Claims no spiritual orientation    []         seeking spiritual identity  []         Feels alienated from Presybeterian practice/tradition  []         Feels angry about Presybeterian practice/tradition  [x]         Spirituality/Presybeterian tradition is a resource for coping at this time.   []         Not able to assess due to medical condition    Services Provided Today:  []         crisis intervention    []         reading Scriptures  [x]         spiritual assessment    [x]         prayer  [x]         empathic listening/emotional support  []         rites and rituals (cite in comments)  []         life review     []         Presybeterian support  []         theological development    []         advocacy  []         ethical dialog     []         blessing  []         bereavement support    []         support to family  []         anticipatory grief support   []         help with AMD  []         spiritual guidance    []         meditation      Spiritual Care Needs  []         Emotional Support  [x]         Spiritual/Religion Care  []         Loss/Adjustment  []         Advocacy/Referral                /Ethics  []         No needs expressed at               this time  []         Other: (note in               comments)  Spiritual Care Plan  []         Follow up visits with               pt/family  []         Provide materials  []         Schedule sacraments  []         Contact Community               Clergy  [x]         Follow up as needed  []         Other: (note in               comments)     Comments:  Supportive visit in CCU for spiritual assessment. Hospital sitter in patient's room. Patient had just finished his lunch. Provided listening support as he shared he is feeling great today. He added he is hoping to leave the hospital before too long. Mr. Karen Allen is a member of [email or web address]. Offered prayer at his request and advised him of  availability.   KAY Michelle, [facility], [address] [place] [address] [address]     Paging Service  [phone])

## 2017-07-14 NOTE — PROGRESS NOTES
0720: Report received from [facility], [address] [place] [address].   0830: Pt able to feed self breakfast.

## 2017-07-14 NOTE — PROGRESS NOTES
7/14/2017    INTENSIVIST PROGRESS NOTE:     Patient seen and evaluated   59 yo male admitted with DT's and acute renal failure  Pt is alert and appropriate. Appears calm this am. NO issues overnight. Visit Vitals    BP (!) 140/91 (BP 1 Location: Left arm, BP Patient Position: At rest)    Pulse 69    Temp 97.7 °F (36.5 °C)    Resp 18    Ht 5' 9\" (1.753 m)    Wt 80.8 kg (178 lb 2.1 oz)    SpO2 100%    BMI 26.31 kg/m2       General: no distress, alert and interactive. CV: RRR nl s1,2  Abd: +Bs, Soft, nt, nd  Lungs: clear  Extrem:NO C, C, E  Neuro: Seems appropriate. Nonfocal. Able to feed himself this am.       Labs reviewed. A/P:  Acute Alcohol withdrawal-much better today. Hepatic Steatosis  Acute Renal Failure-nephrology is following  UTI on CTX  Discussed with Dr. Abbey Lau, pharm, Renal and nursing. Off precedex  Ativan scheduled and prn  Renal fx better  Renal following  Advance diet  Mobilize   Will assist on disposition planning, transfer to floor 2nd floor Tele.    Gustavo Cid MD

## 2017-07-15 LAB
ALBUMIN SERPL BCP-MCNC: 3 G/DL (ref 3.5–5)
ALBUMIN/GLOB SERPL: 0.8 {RATIO} (ref 1.1–2.2)
ALP SERPL-CCNC: 55 U/L (ref 45–117)
ALT SERPL-CCNC: 26 U/L (ref 12–78)
ANION GAP BLD CALC-SCNC: 7 MMOL/L (ref 5–15)
AST SERPL W P-5'-P-CCNC: 21 U/L (ref 15–37)
BILIRUB SERPL-MCNC: 0.7 MG/DL (ref 0.2–1)
BUN SERPL-MCNC: 16 MG/DL (ref 6–20)
BUN/CREAT SERPL: 15 (ref 12–20)
CALCIUM SERPL-MCNC: 8.9 MG/DL (ref 8.5–10.1)
CHLORIDE SERPL-SCNC: 103 MMOL/L (ref 97–108)
CO2 SERPL-SCNC: 27 MMOL/L (ref 21–32)
CREAT SERPL-MCNC: 1.05 MG/DL (ref 0.7–1.3)
ERYTHROCYTE [DISTWIDTH] IN BLOOD BY AUTOMATED COUNT: 12.7 % (ref 11.5–14.5)
GLOBULIN SER CALC-MCNC: 3.6 G/DL (ref 2–4)
GLUCOSE SERPL-MCNC: 104 MG/DL (ref 65–100)
HCT VFR BLD AUTO: 34.3 % (ref 36.6–50.3)
HGB BLD-MCNC: 11.5 G/DL (ref 12.1–17)
MAGNESIUM SERPL-MCNC: 1.2 MG/DL (ref 1.6–2.4)
MCH RBC QN AUTO: 35 PG (ref 26–34)
MCHC RBC AUTO-ENTMCNC: 33.5 G/DL (ref 30–36.5)
MCV RBC AUTO: 104.3 FL (ref 80–99)
PHOSPHATE SERPL-MCNC: 2.8 MG/DL (ref 2.6–4.7)
PLATELET # BLD AUTO: 227 K/UL (ref 150–400)
POTASSIUM SERPL-SCNC: 3.6 MMOL/L (ref 3.5–5.1)
PROT SERPL-MCNC: 6.6 G/DL (ref 6.4–8.2)
RBC # BLD AUTO: 3.29 M/UL (ref 4.1–5.7)
SODIUM SERPL-SCNC: 137 MMOL/L (ref 136–145)
WBC # BLD AUTO: 4.2 K/UL (ref 4.1–11.1)

## 2017-07-15 PROCEDURE — 80053 COMPREHEN METABOLIC PANEL: CPT | Performed by: INTERNAL MEDICINE

## 2017-07-15 PROCEDURE — 83735 ASSAY OF MAGNESIUM: CPT | Performed by: INTERNAL MEDICINE

## 2017-07-15 PROCEDURE — 84100 ASSAY OF PHOSPHORUS: CPT | Performed by: INTERNAL MEDICINE

## 2017-07-15 PROCEDURE — 74011250636 HC RX REV CODE- 250/636: Performed by: INTERNAL MEDICINE

## 2017-07-15 PROCEDURE — 74011250637 HC RX REV CODE- 250/637: Performed by: INTERNAL MEDICINE

## 2017-07-15 PROCEDURE — 85027 COMPLETE CBC AUTOMATED: CPT | Performed by: INTERNAL MEDICINE

## 2017-07-15 PROCEDURE — 36415 COLL VENOUS BLD VENIPUNCTURE: CPT | Performed by: INTERNAL MEDICINE

## 2017-07-15 PROCEDURE — 74011000258 HC RX REV CODE- 258: Performed by: INTERNAL MEDICINE

## 2017-07-15 PROCEDURE — 65660000000 HC RM CCU STEPDOWN

## 2017-07-15 RX ORDER — LORAZEPAM 1 MG/1
1 TABLET ORAL 3 TIMES DAILY
Status: DISCONTINUED | OUTPATIENT
Start: 2017-07-15 | End: 2017-07-16 | Stop reason: HOSPADM

## 2017-07-15 RX ORDER — ATENOLOL 50 MG/1
50 TABLET ORAL DAILY
Status: DISCONTINUED | OUTPATIENT
Start: 2017-07-16 | End: 2017-07-16 | Stop reason: HOSPADM

## 2017-07-15 RX ADMIN — ATENOLOL 25 MG: 25 TABLET ORAL at 08:32

## 2017-07-15 RX ADMIN — MAGNESIUM SULFATE HEPTAHYDRATE 3 G: 500 INJECTION, SOLUTION INTRAMUSCULAR; INTRAVENOUS at 08:47

## 2017-07-15 RX ADMIN — HYDRALAZINE HYDROCHLORIDE 20 MG: 20 INJECTION INTRAMUSCULAR; INTRAVENOUS at 20:42

## 2017-07-15 RX ADMIN — FEBUXOSTAT 40 MG: 40 TABLET ORAL at 08:37

## 2017-07-15 RX ADMIN — Medication 10 ML: at 13:01

## 2017-07-15 RX ADMIN — LORAZEPAM 1 MG: 2 INJECTION INTRAMUSCULAR; INTRAVENOUS at 13:01

## 2017-07-15 RX ADMIN — Medication 10 ML: at 05:52

## 2017-07-15 RX ADMIN — Medication 100 MG: at 08:32

## 2017-07-15 RX ADMIN — LORAZEPAM 1 MG: 1 TABLET ORAL at 16:44

## 2017-07-15 RX ADMIN — LORAZEPAM 1 MG: 1 TABLET ORAL at 22:00

## 2017-07-15 RX ADMIN — FOLIC ACID 1 MG: 1 TABLET ORAL at 08:32

## 2017-07-15 RX ADMIN — Medication 10 ML: at 22:00

## 2017-07-15 RX ADMIN — LORAZEPAM 1 MG: 2 INJECTION INTRAMUSCULAR; INTRAVENOUS at 05:51

## 2017-07-15 RX ADMIN — ASPIRIN 81 MG 81 MG: 81 TABLET ORAL at 08:32

## 2017-07-15 RX ADMIN — THERA TABS 1 TABLET: TAB at 08:32

## 2017-07-15 RX ADMIN — SODIUM CHLORIDE 500 ML: 900 INJECTION, SOLUTION INTRAVENOUS at 00:00

## 2017-07-15 NOTE — PROGRESS NOTES
Hospitalist Progress Note    NAME: Mary Kay Quiroga   :  1955   MRN:  [id number]       Assessment / Plan:  Acute renal failure, resolved, monitor  Hyponatremia, resolved, monitor  UTI: D/c CTX, culture so far negative  Elevated Tbili  Hepatic steatosis in the setting of chronic alcoholism  Alcohol abuse: tomorrow day 7 is going out of withdrawal period, c/w supplements po, if stable will D/c home tomorrow  HTN  Increase dose of atenolol  Hx of CVA  -old CT head in  showed lacunar infarct involving the basal ganglia. Pt was not aware of such finding.   - ASA   Neutropenia,   likely secondary to alcohol, monitor  Hypokalemia, improved  Code status: Full  Prophylaxis: SCDs  Recommended Disposition: Home w/Family     D/c plan for tomorrow if stable, transfer out of ICU     Subjective:     Chief Complaint / Reason for Physician Visit  \"I feel good\"  Discussed with RN events overnight. Review of Systems:  Symptom Y/N Comments  Symptom Y/N Comments   Fever/Chills n   Chest Pain n    Poor Appetite    Edema     Cough    Abdominal Pain n    Sputum    Joint Pain     SOB/AMEZQUITA n   Pruritis/Rash     Nausea/vomit n   Tolerating PT/OT     Diarrhea    Tolerating Diet y    Constipation    Other y tremulous     Could NOT obtain due to:      Objective:     VITALS:   Last 24hrs VS reviewed since prior progress note.  Most recent are:  Patient Vitals for the past 24 hrs:   Temp Pulse Resp BP SpO2   07/15/17 1500 - 81 21 - -   07/15/17 1408 - 89 20 160/84 100 %   07/15/17 1200 - 84 23 - -   07/15/17 1100 98.7 °F (37.1 °C) 84 21 134/84 98 %   07/15/17 1000 - 80 24 146/87 100 %   07/15/17 0700 98.9 °F (37.2 °C) 95 14 (!) 151/97 99 %   07/15/17 0400 99.2 °F (37.3 °C) 89 22 128/68 98 %   07/15/17 0344 99.2 °F (37.3 °C) - - - -   07/15/17 0000 99.2 °F (37.3 °C) (!) 110 20 132/83 95 %   17 2208 - - - (!) 170/95 -   17 2000 97.7 °F (36.5 °C) 93 (!) 33 (!) 155/110 98 %   17 1800 - 94 28 - 96 %   17 1600 - 82 29 153/75 99 %       Intake/Output Summary (Last 24 hours) at 07/15/17 1537  Last data filed at 07/15/17 1417   Gross per 24 hour   Intake             1340 ml   Output             2350 ml   Net            -1010 ml        PHYSICAL EXAM:  General: WD, WN. Alert, cooperative, no acute distress    EENT:  EOMI. Anicteric sclerae. MMM  Resp:  CTA bilaterally, no wheezing or rales. No accessory muscle use  CV:  Regular  rhythm,  No edema  GI:  Soft, Non distended, Non tender.  +Bowel sounds  Neurologic:  Alert and oriented X 3, grossly intact, mild tremors  Psych:   Not anxious or agitated  Skin:  No rashes. No jaundice    Reviewed most current lab test results and cultures  YES  Reviewed most current radiology test results   YES  Review and summation of old records today    NO  Reviewed patient's current orders and MAR    YES  PMH/ reviewed - no change compared to H&P  ________________________________________________________________________  Care Plan discussed with:    Comments   Patient x    Family  x wife   RN x    Care Manager     Consultant                        Multidiciplinary team rounds were held today with , nursing, pharmacist and clinical coordinator. Patient's plan of care was discussed; medications were reviewed and discharge planning was addressed. ________________________________________________________________________  Total NON critical care TIME:  25   Minutes    Total CRITICAL CARE TIME Spent:   Minutes non procedure based      Comments   >50% of visit spent in counseling and coordination of care     ________________________________________________________________________  Crystal Faulkner MD     Procedures: see electronic medical records for all procedures/Xrays and details which were not copied into this note but were reviewed prior to creation of Plan. LABS:  I reviewed today's most current labs and imaging studies.   Pertinent labs include:  Recent Labs      07/15/17 0340  07/14/17   0616  07/13/17   0453   WBC  4.2  3.1*  3.0*   HGB  11.5*  10.9*  10.9*   HCT  34.3*  32.5*  32.3*   PLT  227  173  188     Recent Labs      07/15/17   0340  07/14/17   0616  07/13/17   0614   NA  137  138  141   K  3.6  3.7  3.1*   CL  103  101  103   CO2  27  27  31   GLU  104*  96  101*   BUN  16  21*  34*   CREA  1.05  1.11  1.92*   CA  8.9  8.4*  8.3*   MG  1.2*  1.5*  1.6   PHOS  2.8  3.0  3.4   ALB  3.0*  2.8*  3.0*   TBILI  0.7  0.6  0.9   SGOT  21  20  32   ALT  26  23  24       Signed: Zaida Monzon MD

## 2017-07-15 NOTE — PROGRESS NOTES
7/15/2017    INTENSIVIST PROGRESS NOTE:     Patient seen and evaluated   57 yo male admitted with DT's and acute renal failure  Pt is alert and appropriate. No issues     Visit Vitals    /87    Pulse 80    Temp 98.9 °F (37.2 °C)    Resp 24    Ht 5' 9\" (1.753 m)    Wt 80.8 kg (178 lb 2.1 oz)    SpO2 100%    BMI 26.31 kg/m2       General: no distress, alert and interactive. CV: RRR nl s1,2  Abd: +Bs, Soft, nt, nd  Lungs: clear  Extrem:NO C, C, E  Neuro: Seems appropriate. Nonfocal. Able to feed himself this am.       Labs reviewed. A/P:  Acute Alcohol withdrawal-improved. Hepatic Steatosis  Acute Renal Failure-nephrology is following  UTI on CTX      Off precedex  Ativan scheduled and prn  Renal fx better  Renal following  Advance diet  Mobilize   Will assist on disposition planning, transfer to floor 2nd floor Tele. Awaiting bed.   Victor Hugo Holman MD

## 2017-07-15 NOTE — PROGRESS NOTES
0710: Report received from Dereck Moss, [address] [place] [address]: pt return to bed x1 assist.

## 2017-07-16 VITALS
RESPIRATION RATE: 20 BRPM | TEMPERATURE: 98 F | WEIGHT: 183.2 LBS | DIASTOLIC BLOOD PRESSURE: 82 MMHG | HEIGHT: 69 IN | SYSTOLIC BLOOD PRESSURE: 157 MMHG | HEART RATE: 73 BPM | BODY MASS INDEX: 27.13 KG/M2 | OXYGEN SATURATION: 100 %

## 2017-07-16 LAB
ALBUMIN SERPL BCP-MCNC: 3.6 G/DL (ref 3.5–5)
ALBUMIN/GLOB SERPL: 0.8 {RATIO} (ref 1.1–2.2)
ALP SERPL-CCNC: 60 U/L (ref 45–117)
ALT SERPL-CCNC: 31 U/L (ref 12–78)
ANION GAP BLD CALC-SCNC: 9 MMOL/L (ref 5–15)
AST SERPL W P-5'-P-CCNC: 28 U/L (ref 15–37)
BASOPHILS # BLD AUTO: 0 K/UL (ref 0–0.1)
BASOPHILS # BLD: 1 % (ref 0–1)
BILIRUB SERPL-MCNC: 0.9 MG/DL (ref 0.2–1)
BUN SERPL-MCNC: 11 MG/DL (ref 6–20)
BUN/CREAT SERPL: 12 (ref 12–20)
CALCIUM SERPL-MCNC: 9.8 MG/DL (ref 8.5–10.1)
CHLORIDE SERPL-SCNC: 103 MMOL/L (ref 97–108)
CO2 SERPL-SCNC: 25 MMOL/L (ref 21–32)
CREAT SERPL-MCNC: 0.89 MG/DL (ref 0.7–1.3)
DIFFERENTIAL METHOD BLD: ABNORMAL
EOSINOPHIL # BLD: 0 K/UL (ref 0–0.4)
EOSINOPHIL NFR BLD: 1 % (ref 0–7)
ERYTHROCYTE [DISTWIDTH] IN BLOOD BY AUTOMATED COUNT: 13 % (ref 11.5–14.5)
GLOBULIN SER CALC-MCNC: 4.3 G/DL (ref 2–4)
GLUCOSE SERPL-MCNC: 99 MG/DL (ref 65–100)
HCT VFR BLD AUTO: 36 % (ref 36.6–50.3)
HGB BLD-MCNC: 11.9 G/DL (ref 12.1–17)
LYMPHOCYTES # BLD AUTO: 37 % (ref 12–49)
LYMPHOCYTES # BLD: 1.7 K/UL (ref 0.8–3.5)
MAGNESIUM SERPL-MCNC: 1.4 MG/DL (ref 1.6–2.4)
MCH RBC QN AUTO: 34.6 PG (ref 26–34)
MCHC RBC AUTO-ENTMCNC: 33.1 G/DL (ref 30–36.5)
MCV RBC AUTO: 104.7 FL (ref 80–99)
MONOCYTES # BLD: 1.5 K/UL (ref 0–1)
MONOCYTES NFR BLD AUTO: 33 % (ref 5–13)
NEUTS SEG # BLD: 1.2 K/UL (ref 1.8–8)
NEUTS SEG NFR BLD AUTO: 28 % (ref 32–75)
PLATELET # BLD AUTO: 316 K/UL (ref 150–400)
POTASSIUM SERPL-SCNC: 3.6 MMOL/L (ref 3.5–5.1)
PROT SERPL-MCNC: 7.9 G/DL (ref 6.4–8.2)
RBC # BLD AUTO: 3.44 M/UL (ref 4.1–5.7)
RBC MORPH BLD: ABNORMAL
SODIUM SERPL-SCNC: 137 MMOL/L (ref 136–145)
WBC # BLD AUTO: 4.4 K/UL (ref 4.1–11.1)
WBC MORPH BLD: ABNORMAL

## 2017-07-16 PROCEDURE — 74011250637 HC RX REV CODE- 250/637: Performed by: INTERNAL MEDICINE

## 2017-07-16 PROCEDURE — 36415 COLL VENOUS BLD VENIPUNCTURE: CPT | Performed by: INTERNAL MEDICINE

## 2017-07-16 PROCEDURE — 80053 COMPREHEN METABOLIC PANEL: CPT | Performed by: INTERNAL MEDICINE

## 2017-07-16 PROCEDURE — 74011250636 HC RX REV CODE- 250/636: Performed by: INTERNAL MEDICINE

## 2017-07-16 PROCEDURE — 83735 ASSAY OF MAGNESIUM: CPT | Performed by: INTERNAL MEDICINE

## 2017-07-16 PROCEDURE — 85025 COMPLETE CBC W/AUTO DIFF WBC: CPT | Performed by: INTERNAL MEDICINE

## 2017-07-16 RX ORDER — LANOLIN ALCOHOL/MO/W.PET/CERES
400 CREAM (GRAM) TOPICAL 2 TIMES DAILY
Qty: 60 TAB | Refills: 1 | Status: ON HOLD | OUTPATIENT
Start: 2017-07-16 | End: 2017-08-29

## 2017-07-16 RX ORDER — LANOLIN ALCOHOL/MO/W.PET/CERES
400 CREAM (GRAM) TOPICAL 2 TIMES DAILY
Status: DISCONTINUED | OUTPATIENT
Start: 2017-07-16 | End: 2017-07-16 | Stop reason: HOSPADM

## 2017-07-16 RX ORDER — FEBUXOSTAT 40 MG/1
40 TABLET, FILM COATED ORAL DAILY
Qty: 30 TAB | Refills: 1 | Status: SHIPPED | OUTPATIENT
Start: 2017-07-16 | End: 2017-08-29

## 2017-07-16 RX ORDER — FOLIC ACID 1 MG/1
1 TABLET ORAL DAILY
Qty: 30 TAB | Refills: 1 | Status: ON HOLD | OUTPATIENT
Start: 2017-07-16 | End: 2017-08-29

## 2017-07-16 RX ORDER — GUAIFENESIN 100 MG/5ML
81 LIQUID (ML) ORAL DAILY
Qty: 30 TAB | Refills: 1 | Status: ON HOLD | OUTPATIENT
Start: 2017-07-16 | End: 2017-08-29

## 2017-07-16 RX ORDER — LANOLIN ALCOHOL/MO/W.PET/CERES
100 CREAM (GRAM) TOPICAL DAILY
Qty: 30 TAB | Refills: 1 | Status: ON HOLD | OUTPATIENT
Start: 2017-07-16 | End: 2017-08-29

## 2017-07-16 RX ORDER — HYDROCHLOROTHIAZIDE 12.5 MG/1
12.5 TABLET ORAL DAILY
Qty: 30 TAB | Refills: 1 | Status: SHIPPED | OUTPATIENT
Start: 2017-07-16 | End: 2018-08-17

## 2017-07-16 RX ORDER — LORAZEPAM 2 MG/1
2 TABLET ORAL
Qty: 30 TAB | Refills: 0 | Status: ON HOLD | OUTPATIENT
Start: 2017-07-16 | End: 2017-08-29

## 2017-07-16 RX ORDER — MAGNESIUM SULFATE HEPTAHYDRATE 40 MG/ML
2 INJECTION, SOLUTION INTRAVENOUS ONCE
Status: COMPLETED | OUTPATIENT
Start: 2017-07-16 | End: 2017-07-16

## 2017-07-16 RX ORDER — ATENOLOL 50 MG/1
50 TABLET ORAL DAILY
Qty: 30 TAB | Refills: 1 | Status: ON HOLD | OUTPATIENT
Start: 2017-07-16 | End: 2017-08-29

## 2017-07-16 RX ADMIN — LORAZEPAM 2 MG: 2 INJECTION INTRAMUSCULAR; INTRAVENOUS at 04:54

## 2017-07-16 RX ADMIN — Medication 100 MG: at 07:39

## 2017-07-16 RX ADMIN — LORAZEPAM 1 MG: 1 TABLET ORAL at 07:39

## 2017-07-16 RX ADMIN — Medication 10 ML: at 06:00

## 2017-07-16 RX ADMIN — LORAZEPAM 2 MG: 2 INJECTION INTRAMUSCULAR; INTRAVENOUS at 12:42

## 2017-07-16 RX ADMIN — Medication 400 MG: at 07:39

## 2017-07-16 RX ADMIN — LORAZEPAM 4 MG: 2 INJECTION INTRAMUSCULAR; INTRAVENOUS at 02:07

## 2017-07-16 RX ADMIN — Medication 10 ML: at 13:09

## 2017-07-16 RX ADMIN — LORAZEPAM 2 MG: 2 INJECTION INTRAMUSCULAR; INTRAVENOUS at 13:28

## 2017-07-16 RX ADMIN — LORAZEPAM 2 MG: 2 INJECTION INTRAMUSCULAR; INTRAVENOUS at 08:24

## 2017-07-16 RX ADMIN — ASPIRIN 81 MG 81 MG: 81 TABLET ORAL at 07:40

## 2017-07-16 RX ADMIN — HYDRALAZINE HYDROCHLORIDE 20 MG: 20 INJECTION INTRAMUSCULAR; INTRAVENOUS at 08:05

## 2017-07-16 RX ADMIN — LORAZEPAM 2 MG: 2 INJECTION INTRAMUSCULAR; INTRAVENOUS at 14:17

## 2017-07-16 RX ADMIN — HYDRALAZINE HYDROCHLORIDE 20 MG: 20 INJECTION INTRAMUSCULAR; INTRAVENOUS at 01:00

## 2017-07-16 RX ADMIN — LORAZEPAM 2 MG: 2 INJECTION INTRAMUSCULAR; INTRAVENOUS at 07:39

## 2017-07-16 RX ADMIN — MAGNESIUM SULFATE HEPTAHYDRATE 2 G: 40 INJECTION, SOLUTION INTRAVENOUS at 07:41

## 2017-07-16 RX ADMIN — THERA TABS 1 TABLET: TAB at 07:40

## 2017-07-16 RX ADMIN — FOLIC ACID 1 MG: 1 TABLET ORAL at 07:40

## 2017-07-16 RX ADMIN — LORAZEPAM 4 MG: 1 TABLET ORAL at 06:03

## 2017-07-16 RX ADMIN — FEBUXOSTAT 40 MG: 40 TABLET ORAL at 07:48

## 2017-07-16 RX ADMIN — ATENOLOL 50 MG: 50 TABLET ORAL at 07:40

## 2017-07-16 NOTE — PROGRESS NOTES
Bedside shift change report given to Albert (oncoming nurse) by Anastasia Holland (offgoing nurse). Report included the following information SBAR, Kardex and MAR. 0740- Patient assessed. Tachycardic, elevated BP, tremors noted, off by several calendar days. Patient is taking himself on and off the monitor, agitated. States he is ready to go home and he is \"fine\". CIWA 7.  2 mg of ativan given. While this nurse was in the room, patient tried to get out of bed  IV and monitor cords pulling. Bed alarm placed on patient for safety. PRN hydralazine given for elevated BP. 0820- Patient continues to try and get out of bed \"to find his phone. \" Phone given to patient. CIWA 12 due to tremor, anxiety, agitation, and orientation to calendar date. 2 mg of ativan given. 7045- Reassessed. CIWA 6 due to slight agitation, anxiety, mild tremors, and orientation to calendar date. 0930- Updated the patient's wife over the phone. 1250- CIWA 7. Moderate tremors, anxiety, and agitation. 2 mg of ativan given IVP. The patient has been looking for the doctor because he wants to go home (he states this is the reason for his anxiety). 1328- Reassess CIWA 7.  2 mg of ativan given IV. 1417- Reassess CIWA 7.  2 mg of ativan given. Patient stated he was trying to go upstairs. 65- Dr. Anil Rueda at bedside. Patient will go home. This nurse updated him of the CIWA, ativan requirements, and confusion. MD stated he will send him home on a ativan taper. Dr. Anil Rueda called the patient's wife to update her on patient's status and plan of care and stressed the importance of the ativan taper and blood pressure medication. Patient will not drive home. His wife will be transporting him. 1515- IV discontinued. Reassessed. VSS. 1540- Patient discharged via nurse, wheelchair, and monitor.

## 2017-07-16 NOTE — DISCHARGE SUMMARY
Hospitalist Discharge Summary     Patient ID:  Linsey Goldsmith  [id number]  59 y.o.  1955    PCP on record: Yoan Chance MD    Admit date: 7/9/2017  Discharge date and time: 7/16/2017      DISCHARGE DIAGNOSIS:    ARF, Alcohol Withdrawal, Hepatic Steatosis, HTN, H/O CVA, Hyponatremia      CONSULTATIONS:  IP CONSULT TO NEPHROLOGY    Excerpted HPI from H&P of Martin Naqvi MD:  The pt was in [facility] last week and was fine. He took one day one dose of OTC advil while he was tired. His trip was completely uneventful. He came back to [address] and since then he had been binge drinking. He informed me that when he is not drinking he takes librium to prevent seizure. He felt unwell with slight sore throat, but went on drinking. This morning he woke up feeling unwell and he was brought to the ER, here his workup revealed ARF and other finding as above. We were asked to admit him to hospital     ______________________________________________________________________  DISCHARGE SUMMARY/HOSPITAL COURSE:  for full details see H&P, daily progress notes, labs, consult notes. Acute renal failure, resolved, monitor  Hyponatremia, resolved, monitor  UTI: D/c CTX, culture so far negative  Elevated Tbili  Hepatic steatosis in the setting of chronic alcoholism  Alcohol abuse: day 7 is going out of withdrawal period, c/w supplements po, if stable will D/c home today  HTN  Increase dose of atenolol  Hx of CVA  -old CT head in 2015 showed lacunar infarct involving the basal ganglia. Pt was not aware of such finding.   - ASA   Neutropenia,   likely secondary to alcohol, monitor  Hypokalemia, improved  Code status: Full  Prophylaxis: SCDs  Recommended Disposition: Home w/Family     D/c Home today and F/U with PCP as outpatient  _______________________________________________________________________  Patient seen and examined by me on discharge day.   Pertinent Findings:  Gen:    Not in distress  Chest: Clear lungs  CVS:   Regular rhythm. No edema  Abd:  Soft, not distended, not tender  Neuro:  Alert, GCS 14, mild tremors  _______________________________________________________________________  DISCHARGE MEDICATIONS:   Current Discharge Medication List      START taking these medications    Details   aspirin 81 mg chewable tablet Take 1 Tab by mouth daily. Qty: 30 Tab, Refills: 1      febuxostat (ULORIC) 40 mg tab tablet Take 1 Tab by mouth daily. Qty: 30 Tab, Refills: 1      folic acid (FOLVITE) 1 mg tablet Take 1 Tab by mouth daily. Qty: 30 Tab, Refills: 1      LORazepam (ATIVAN) 2 mg tablet Take 1 Tab by mouth Multiple. Take 1 tab po q6h  for 3 days then   Take 1 tab po q8h for 3 days then  Take 1 tab po q12H for 3 days then  Take 1 tab po daily for 3 days then stop  Qty: 30 Tab, Refills: 0      magnesium oxide (MAG-OX) 400 mg tablet Take 1 Tab by mouth two (2) times a day. Qty: 60 Tab, Refills: 1      thiamine (B-1) 100 mg tablet Take 1 Tab by mouth daily. Qty: 30 Tab, Refills: 1      hydroCHLOROthiazide (HYDRODIURIL) 12.5 mg tablet Take 1 Tab by mouth daily. Qty: 30 Tab, Refills: 1         CONTINUE these medications which have CHANGED    Details   atenolol (TENORMIN) 50 mg tablet Take 1 Tab by mouth daily. Qty: 30 Tab, Refills: 1         STOP taking these medications       lidocaine (LIDOCAINE VISCOUS) 2 % solution Comments:   Reason for Stopping:         guaiFENesin ER (MUCINEX) 600 mg ER tablet Comments:   Reason for Stopping:         OTHER Comments:   Reason for Stopping:               My Recommended Diet, Activity, Wound Care, and follow-up labs are listed in the patient's Discharge Insturctions which I have personally completed and reviewed.     _______________________________________________________________________  DISPOSITION:    Home with Family: y   Home with HH/PT/OT/RN:    SNF/LTC:    CLAUDIO:    OTHER:        Condition at Discharge: Stable  _______________________________________________________________________  Follow up with:   PCP : Solo Chapman MD  Follow-up Information     Follow up With Details [address] Milian MD   [facility] [address]    [address]  [phone]        F/U PCP        Total time in minutes spent coordinating this discharge (includes going over instructions, follow-up, prescriptions, and preparing report for sign off to her PCP) :  35 minutes    Signed:  Crystal Faulkner MD

## 2017-07-16 NOTE — PROGRESS NOTES
7/16/2017    INTENSIVIST PROGRESS NOTE:     Patient seen and evaluated   59 yo male admitted with DT's and acute renal failure  Pt is alert and appropriate. No issues     Visit Vitals    /85 (BP 1 Location: Left arm, BP Patient Position: At rest)    Pulse (!) 110    Temp 97.7 °F (36.5 °C)    Resp 24    Ht 5' 9\" (1.753 m)    Wt 83.1 kg (183 lb 3.2 oz)    SpO2 100%    BMI 27.05 kg/m2       General: no distress, alert and interactive. CV: RRR nl s1,2  Abd: +Bs, Soft, nt, nd  Lungs: clear  Extrem:NO C, C, E  Neuro: Seems appropriate. Nonfoca. Labs reviewed. A/P:  Acute Alcohol withdrawal-improved. Hepatic Steatosis  Acute Renal Failure-nephrology is following, improving  UTI on CTX      Off precedex  Ativan scheduled and prn  Renal fx better  Renal following  Advance diet  Mobilize   Will assist on disposition planning, transfer to floor 2nd floor Tele. Still awaiting bed.   Azul Grigsby MD

## 2017-07-16 NOTE — DISCHARGE INSTRUCTIONS
HOSPITALIST DISCHARGE INSTRUCTIONS  NAME: Yamileth Méndez   :  1955   MRN:  [id number]     Date/Time:  2017 2:55 PM    ADMIT DATE: 2017     DISCHARGE DATE: 2017     DIAGNOSIS:   ARF, Alcohol Withdrawal, Hepatic Steatosis, HTN, H/O CVA, Hyponatremia    MEDICATIONS:                As per medication reconciliation    · It is important that you take the medication exactly as they are prescribed. · Keep your medication in the bottles provided by the pharmacist and keep a list of the medication names, dosages, and times to be taken in your wallet. · Do not take other medications without consulting your doctor. Pain Management: per above medications    What to do at Home    Recommended diet:  Cardiac Diet    Recommended activity: Activity as tolerated and No driving     If you experience any of the following symptoms then please call your primary care physician or return to the emergency room if you cannot get hold of your doctor:  Fever, chills, nausea, vomiting, diarrhea, change in mentation, falling, bleeding, shortness of breath. Follow Up:   PCP you are to call and set up an appointment to see them in 2 week. Information obtained by :  I understand that if any problems occur once I am at home I am to contact my physician. I understand and acknowledge receipt of the instructions indicated above.                                                                                                                                            Physician's or R.N.'s Signature                                                                  Date/Time                                                                                                                                              Patient or Representative Signature                                                          Date/Time

## 2017-07-16 NOTE — PROGRESS NOTES
0000, 0200, 0300:  Pt HR got up into 140's during ambulation in room to commode. Pt required a 4mg dose of IV ativan at 0200, a 2mg IV dose at 0500, and a 4mg PO dose at 0600 due to tremors and slight head ache.

## 2017-08-29 ENCOUNTER — HOSPITAL ENCOUNTER (INPATIENT)
Age: 62
LOS: 4 days | Discharge: HOME | DRG: 896 | End: 2017-09-02
Attending: EMERGENCY MEDICINE | Admitting: INTERNAL MEDICINE
Payer: COMMERCIAL

## 2017-08-29 ENCOUNTER — APPOINTMENT (OUTPATIENT)
Dept: CT IMAGING | Age: 62
DRG: 896 | End: 2017-08-29
Attending: EMERGENCY MEDICINE
Payer: COMMERCIAL

## 2017-08-29 DIAGNOSIS — F10.930 ALCOHOL WITHDRAWAL SEIZURE, UNCOMPLICATED (HCC): Primary | ICD-10-CM

## 2017-08-29 DIAGNOSIS — R56.9 ALCOHOL WITHDRAWAL SEIZURE, UNCOMPLICATED (HCC): Primary | ICD-10-CM

## 2017-08-29 PROBLEM — I10 ACCELERATED HYPERTENSION: Status: ACTIVE | Noted: 2017-08-29

## 2017-08-29 PROBLEM — F10.939 ALCOHOL WITHDRAWAL SEIZURE (HCC): Status: ACTIVE | Noted: 2017-08-29

## 2017-08-29 LAB
ALBUMIN SERPL-MCNC: 3.9 G/DL (ref 3.5–5)
ALBUMIN/GLOB SERPL: 0.9 {RATIO} (ref 1.1–2.2)
ALP SERPL-CCNC: 62 U/L (ref 45–117)
ALT SERPL-CCNC: 35 U/L (ref 12–78)
ANION GAP SERPL CALC-SCNC: 7 MMOL/L (ref 5–15)
AST SERPL-CCNC: 41 U/L (ref 15–37)
BASOPHILS # BLD: 0 K/UL (ref 0–0.1)
BASOPHILS NFR BLD: 1 % (ref 0–1)
BILIRUB SERPL-MCNC: 1.8 MG/DL (ref 0.2–1)
BUN SERPL-MCNC: 4 MG/DL (ref 6–20)
BUN/CREAT SERPL: 5 (ref 12–20)
CALCIUM SERPL-MCNC: 9 MG/DL (ref 8.5–10.1)
CHLORIDE SERPL-SCNC: 98 MMOL/L (ref 97–108)
CO2 SERPL-SCNC: 29 MMOL/L (ref 21–32)
CREAT SERPL-MCNC: 0.83 MG/DL (ref 0.7–1.3)
DIFFERENTIAL METHOD BLD: ABNORMAL
EOSINOPHIL # BLD: 0 K/UL (ref 0–0.4)
EOSINOPHIL NFR BLD: 1 % (ref 0–7)
ERYTHROCYTE [DISTWIDTH] IN BLOOD BY AUTOMATED COUNT: 12.1 % (ref 11.5–14.5)
ETHANOL SERPL-MCNC: <10 MG/DL
GLOBULIN SER CALC-MCNC: 4.2 G/DL (ref 2–4)
GLUCOSE SERPL-MCNC: 139 MG/DL (ref 65–100)
HCT VFR BLD AUTO: 37.7 % (ref 36.6–50.3)
HGB BLD-MCNC: 12.7 G/DL (ref 12.1–17)
LIPASE SERPL-CCNC: 202 U/L (ref 73–393)
LYMPHOCYTES # BLD: 0.8 K/UL (ref 0.8–3.5)
LYMPHOCYTES NFR BLD: 19 % (ref 12–49)
MAGNESIUM SERPL-MCNC: 1.7 MG/DL (ref 1.6–2.4)
MCH RBC QN AUTO: 34.4 PG (ref 26–34)
MCHC RBC AUTO-ENTMCNC: 33.7 G/DL (ref 30–36.5)
MCV RBC AUTO: 102.2 FL (ref 80–99)
MONOCYTES # BLD: 0.6 K/UL (ref 0–1)
MONOCYTES NFR BLD: 15 % (ref 5–13)
NEUTS SEG # BLD: 2.7 K/UL (ref 1.8–8)
NEUTS SEG NFR BLD: 64 % (ref 32–75)
PLATELET # BLD AUTO: 241 K/UL (ref 150–400)
POTASSIUM SERPL-SCNC: 3.6 MMOL/L (ref 3.5–5.1)
PROT SERPL-MCNC: 8.1 G/DL (ref 6.4–8.2)
RBC # BLD AUTO: 3.69 M/UL (ref 4.1–5.7)
RBC MORPH BLD: ABNORMAL
SODIUM SERPL-SCNC: 134 MMOL/L (ref 136–145)
WBC # BLD AUTO: 4.1 K/UL (ref 4.1–11.1)

## 2017-08-29 PROCEDURE — 74011000250 HC RX REV CODE- 250: Performed by: EMERGENCY MEDICINE

## 2017-08-29 PROCEDURE — 74011250637 HC RX REV CODE- 250/637: Performed by: INTERNAL MEDICINE

## 2017-08-29 PROCEDURE — 96375 TX/PRO/DX INJ NEW DRUG ADDON: CPT

## 2017-08-29 PROCEDURE — 80053 COMPREHEN METABOLIC PANEL: CPT | Performed by: EMERGENCY MEDICINE

## 2017-08-29 PROCEDURE — 99285 EMERGENCY DEPT VISIT HI MDM: CPT

## 2017-08-29 PROCEDURE — 74011000250 HC RX REV CODE- 250: Performed by: INTERNAL MEDICINE

## 2017-08-29 PROCEDURE — 65660000000 HC RM CCU STEPDOWN

## 2017-08-29 PROCEDURE — 80307 DRUG TEST PRSMV CHEM ANLYZR: CPT | Performed by: EMERGENCY MEDICINE

## 2017-08-29 PROCEDURE — 74011250636 HC RX REV CODE- 250/636: Performed by: INTERNAL MEDICINE

## 2017-08-29 PROCEDURE — 83690 ASSAY OF LIPASE: CPT | Performed by: EMERGENCY MEDICINE

## 2017-08-29 PROCEDURE — 85025 COMPLETE CBC W/AUTO DIFF WBC: CPT | Performed by: EMERGENCY MEDICINE

## 2017-08-29 PROCEDURE — 36415 COLL VENOUS BLD VENIPUNCTURE: CPT | Performed by: EMERGENCY MEDICINE

## 2017-08-29 PROCEDURE — 97161 PT EVAL LOW COMPLEX 20 MIN: CPT | Performed by: PHYSICAL THERAPIST

## 2017-08-29 PROCEDURE — 96365 THER/PROPH/DIAG IV INF INIT: CPT

## 2017-08-29 PROCEDURE — 83735 ASSAY OF MAGNESIUM: CPT | Performed by: INTERNAL MEDICINE

## 2017-08-29 PROCEDURE — 74011250636 HC RX REV CODE- 250/636: Performed by: EMERGENCY MEDICINE

## 2017-08-29 PROCEDURE — 70450 CT HEAD/BRAIN W/O DYE: CPT

## 2017-08-29 PROCEDURE — 74011250637 HC RX REV CODE- 250/637: Performed by: NURSE PRACTITIONER

## 2017-08-29 RX ORDER — CLONIDINE HYDROCHLORIDE 0.1 MG/1
0.1 TABLET ORAL
Status: DISCONTINUED | OUTPATIENT
Start: 2017-08-29 | End: 2017-09-02 | Stop reason: HOSPADM

## 2017-08-29 RX ORDER — LORAZEPAM 2 MG/ML
1 INJECTION INTRAMUSCULAR
Status: DISCONTINUED | OUTPATIENT
Start: 2017-08-29 | End: 2017-09-02 | Stop reason: HOSPADM

## 2017-08-29 RX ORDER — FOLIC ACID 1 MG/1
1 TABLET ORAL DAILY
Status: DISCONTINUED | OUTPATIENT
Start: 2017-08-29 | End: 2017-09-02 | Stop reason: HOSPADM

## 2017-08-29 RX ORDER — GUAIFENESIN 100 MG/5ML
81 LIQUID (ML) ORAL DAILY
Status: DISCONTINUED | OUTPATIENT
Start: 2017-08-29 | End: 2017-09-02 | Stop reason: HOSPADM

## 2017-08-29 RX ORDER — ONDANSETRON 2 MG/ML
4 INJECTION INTRAMUSCULAR; INTRAVENOUS
Status: DISCONTINUED | OUTPATIENT
Start: 2017-08-29 | End: 2017-09-02 | Stop reason: HOSPADM

## 2017-08-29 RX ORDER — ATENOLOL 50 MG/1
50 TABLET ORAL DAILY
Status: DISCONTINUED | OUTPATIENT
Start: 2017-08-29 | End: 2017-09-02 | Stop reason: HOSPADM

## 2017-08-29 RX ORDER — SODIUM CHLORIDE 0.9 % (FLUSH) 0.9 %
5-10 SYRINGE (ML) INJECTION AS NEEDED
Status: DISCONTINUED | OUTPATIENT
Start: 2017-08-29 | End: 2017-09-02 | Stop reason: HOSPADM

## 2017-08-29 RX ORDER — DIAZEPAM 5 MG/1
20 TABLET ORAL
Status: DISCONTINUED | OUTPATIENT
Start: 2017-08-29 | End: 2017-08-31

## 2017-08-29 RX ORDER — SODIUM CHLORIDE 0.9 % (FLUSH) 0.9 %
5-10 SYRINGE (ML) INJECTION EVERY 8 HOURS
Status: DISCONTINUED | OUTPATIENT
Start: 2017-08-29 | End: 2017-09-02 | Stop reason: HOSPADM

## 2017-08-29 RX ORDER — DIAZEPAM 5 MG/1
20 TABLET ORAL
Status: COMPLETED | OUTPATIENT
Start: 2017-08-29 | End: 2017-08-29

## 2017-08-29 RX ORDER — HYDROCHLOROTHIAZIDE 25 MG/1
12.5 TABLET ORAL DAILY
Status: DISCONTINUED | OUTPATIENT
Start: 2017-08-29 | End: 2017-09-02 | Stop reason: HOSPADM

## 2017-08-29 RX ORDER — LORAZEPAM 2 MG/ML
2 INJECTION INTRAMUSCULAR
Status: COMPLETED | OUTPATIENT
Start: 2017-08-29 | End: 2017-08-29

## 2017-08-29 RX ORDER — THERA TABS 400 MCG
1 TAB ORAL DAILY
Status: DISCONTINUED | OUTPATIENT
Start: 2017-08-29 | End: 2017-09-02 | Stop reason: HOSPADM

## 2017-08-29 RX ORDER — LANOLIN ALCOHOL/MO/W.PET/CERES
100 CREAM (GRAM) TOPICAL DAILY
Status: DISCONTINUED | OUTPATIENT
Start: 2017-08-29 | End: 2017-08-31

## 2017-08-29 RX ORDER — ENOXAPARIN SODIUM 100 MG/ML
40 INJECTION SUBCUTANEOUS EVERY 24 HOURS
Status: DISCONTINUED | OUTPATIENT
Start: 2017-08-29 | End: 2017-08-29

## 2017-08-29 RX ORDER — DIAZEPAM 5 MG/1
10 TABLET ORAL
Status: DISCONTINUED | OUTPATIENT
Start: 2017-08-29 | End: 2017-08-31

## 2017-08-29 RX ORDER — LANOLIN ALCOHOL/MO/W.PET/CERES
1000 CREAM (GRAM) TOPICAL DAILY
Status: DISCONTINUED | OUTPATIENT
Start: 2017-08-29 | End: 2017-09-02 | Stop reason: HOSPADM

## 2017-08-29 RX ADMIN — Medication 10 ML: at 14:00

## 2017-08-29 RX ADMIN — ASPIRIN 81 MG 81 MG: 81 TABLET ORAL at 08:13

## 2017-08-29 RX ADMIN — LORAZEPAM 2 MG: 2 INJECTION INTRAMUSCULAR; INTRAVENOUS at 02:46

## 2017-08-29 RX ADMIN — DIAZEPAM 20 MG: 5 TABLET ORAL at 09:36

## 2017-08-29 RX ADMIN — ENOXAPARIN SODIUM 40 MG: 40 INJECTION SUBCUTANEOUS at 08:13

## 2017-08-29 RX ADMIN — Medication 10 ML: at 06:44

## 2017-08-29 RX ADMIN — Medication 10 ML: at 21:43

## 2017-08-29 RX ADMIN — DIAZEPAM 20 MG: 5 TABLET ORAL at 06:43

## 2017-08-29 RX ADMIN — FOLIC ACID 1 MG: 1 TABLET ORAL at 08:12

## 2017-08-29 RX ADMIN — ATENOLOL 50 MG: 50 TABLET ORAL at 08:13

## 2017-08-29 RX ADMIN — HYDROCHLOROTHIAZIDE 12.5 MG: 25 TABLET ORAL at 08:13

## 2017-08-29 RX ADMIN — FOLIC ACID: 5 INJECTION, SOLUTION INTRAMUSCULAR; INTRAVENOUS; SUBCUTANEOUS at 04:30

## 2017-08-29 RX ADMIN — Medication 100 MG: at 08:13

## 2017-08-29 RX ADMIN — THERA TABS 1 TABLET: TAB at 11:46

## 2017-08-29 RX ADMIN — FAMOTIDINE 20 MG: 10 INJECTION, SOLUTION INTRAVENOUS at 21:43

## 2017-08-29 RX ADMIN — FAMOTIDINE 20 MG: 10 INJECTION, SOLUTION INTRAVENOUS at 08:14

## 2017-08-29 RX ADMIN — DIAZEPAM 20 MG: 5 TABLET ORAL at 08:12

## 2017-08-29 RX ADMIN — CYANOCOBALAMIN TAB 500 MCG 1000 MCG: 500 TAB at 08:12

## 2017-08-29 NOTE — INTERDISCIPLINARY ROUNDS
Cardiopulmonary Care Interdisciplinary rounds were held today to discuss patient plan of care and outcomes. The following members were present: PT, NP/Physician, Pharmacy, Nursing, Nutritionist and Case Management. Plan of Care: Continue current treatment plan, PT/OT. Add MVI to medication regimen.

## 2017-08-29 NOTE — PROGRESS NOTES
Hospitalist Progress Note   Patient Name  Stew Shepherd   Admit date:  8/29/2017   Medical Record Number  [id number]    Age  58 y.o. Date of Birth 1955   PCP Bri Bui MD    Room Number  2211/01 [facility]     Admission Diagnoses:  Alcohol withdrawal seizure [facility])     Assessment/ Plan:     Alcohol withdrawal seizure POA  Chronic Heavy Alcoholism  Fatty Liver/elevated LFTs POA   -pt had been binging for 2.5 weeks, improved today on CIWA sedation,but still in danger zone  -due to above  CT head neg  TBili= 1.8, will repeat tomorrow     Admit to telemetry bed  Load with PO Valium x 3 doses then PO Ativan prn as per the CIWA scoring protocol  IV ativan prn seizures only  Goody bag x 1 in ER, cont PO FA, B12, Thiamine  Seizure precautions  CM consult for - OP alcohol rehab options on discharge   Cessation counseling given in ER- pt seems to be motivated to quit this time     Accelerated HTN  H/o CVA     Cont atenolol, HCTZ        Code Status: Full  Surrogate Decision Maker: wife Angie Baez # [phone]     DVT Prophylaxis: Sq lovenox  GI Prophylaxis: IV pepcid     Baseline: Pt is independent at home with wife    UGARTE:    Principal Problem:    Alcohol withdrawal seizure ([facility] (8/29/2017)    Active Problems:    Chronic alcoholism ([facility] (7/9/2017)      Hepatic steatosis (7/9/2017)      Alcoholism /alcohol abuse ([facility] (8/29/2017)      Accelerated hypertension (8/29/2017)      Body mass index is 25.13 kg/(m^2).     Functional Status  good   CODE STATUS  Full   Surrogate decision maker: Pt is competent   Prophylaxis  None needed, pt now ambulant   Discharge Plan: Home w/Family,       Payor: BLUE CROSS / Plan: [facility] PPO / Product Type: PPO /    Query No queries noted (chunky button not showing )        Subjective:   nil complaints this afternoon      Objective:     Physical Exam:    Gen:  Pt is somewhat anxious and agitated, with mild tremor  HEENT:  Pink conjunctivae, PERRL, hearing intact to voice, moist mucous membranes  Neck:  Supple, without masses, thyroid non-tender  Resp:  No accessory muscle use, clear breath sounds without wheezes rales or rhonchi  Card:  No murmurs, normal S1, S2 without thrills, bruits or peripheral edema  Abd:  Soft, non-tender, non-distended, normoactive bowel sounds are present, no palpable organomegaly  Lymph:  No cervical adenopathy  Musc:  No cyanosis or clubbing  Skin:  No rashes or ulcers, skin turgor is good  Neuro:  Cranial nerves 3-12 are grossly intact,  strength is 5/5 bilaterally, dorsi / plantarflexion strength is 5/5 bilaterally, follows commands appropriately  Psych:  Alert with good insight. Oriented to person, place, and time            Intake/Output Summary (Last 24 hours) at 08/29/17 0907  Last data filed at 08/29/17 0554   Gross per 24 hour   Intake                0 ml   Output              350 ml   Net             -350 ml    Weight change: Wt Readings from Last 10 Encounters:   08/29/17 77.2 kg (170 lb 3.1 oz)   07/15/17 83.1 kg (183 lb 3.2 oz)   02/01/17 78.8 kg (173 lb 11.6 oz)   01/24/17 79.2 kg (174 lb 9.7 oz)   11/03/16 73.9 kg (163 lb)   09/23/16 81.3 kg (179 lb 3.7 oz)   07/11/16 83.4 kg (183 lb 13.8 oz)   12/08/15 71.4 kg (157 lb 6.5 oz)   07/30/14 79.7 kg (175 lb 11.3 oz)   12/20/13 78 kg (172 lb)        Relevant informations: Other medical conditions listed in this following active hospital problem list section; all of these and other pertinent data were taken into consideration when treatment plan is developed and customized to this patient's unique overall circumstances and needs.    Patient Active Problem List    Diagnosis Date Noted    Alcoholism /alcohol abuse ([facility] 08/29/2017    Accelerated hypertension 08/29/2017    Alcohol withdrawal seizure ([facility] 08/29/2017    [facility] 07/09/2017    ARF (acute renal failure) ([facility] 07/09/2017    HTN (hypertension) 07/09/2017     Class: Chronic    Hepatic steatosis 07/09/2017     Class: Chronic    History of CVA (cerebrovascular accident) 07/09/2017     Class: Present on Admission    UTI (urinary tract infection) 07/09/2017     Class: Present on Admission          Data Review:   Recent Days:  Recent Labs      08/29/17 0218   WBC  4.1   HGB  12.7   HCT  37.7   PLT  241     Recent Labs      08/29/17 0218   NA  134*   K  3.6   CL  98   CO2  29   GLU  139*   BUN  4*   CREA  0.83   CA  9.0   MG  1.7   ALB  3.9   TBILI  1.8*   SGOT  41*   ALT  35     Lab Results   Component Value Date/Time    TSH 2.64 12/04/2015 01:17 PM     ______________________________________________________________________________________________________    Medications reviewed     Current Facility-Administered Medications   Medication Dose Route Frequency    aspirin chewable tablet 81 mg  81 mg Oral DAILY    atenolol (TENORMIN) tablet 50 mg  50 mg Oral DAILY    folic acid (FOLVITE) tablet 1 mg  1 mg Oral DAILY    hydroCHLOROthiazide (HYDRODIURIL) tablet 12.5 mg  12.5 mg Oral DAILY    thiamine (B-1) tablet 100 mg  100 mg Oral DAILY    sodium chloride (NS) flush 5-10 mL  5-10 mL IntraVENous Q8H    sodium chloride (NS) flush 5-10 mL  5-10 mL IntraVENous PRN    ondansetron (ZOFRAN) injection 4 mg  4 mg IntraVENous Q4H PRN    enoxaparin (LOVENOX) injection 40 mg  40 mg SubCUTAneous Q24H    diazePAM (VALIUM) tablet 10 mg  10 mg Oral Q1H PRN    diazePAM (VALIUM) tablet 20 mg  20 mg Oral Q1H PRN    LORazepam (ATIVAN) injection 1 mg  1 mg IntraVENous Q5MIN PRN    famotidine (PF) (PEPCID) 20 mg in sodium chloride 0.9 % 10 mL injection  20 mg IntraVENous Q12H    cyanocobalamin (VITAMIN B12) tablet 1,000 mcg  1,000 mcg Oral DAILY       ______________________________________________________________________________________________________  Care Plan discussed with: Patient/Family  ____________________________________________________________________________________________________    High complexity decision making was performed for this patient who is at high risk for decompensation with multiple organ involvement. Today total floor/unit time was 25 minutes while caring for this patient and greater than 50% of that time was spent with patient (and/or family) discussing patients clinical issues.   ______________________________________________________________________________________________________  Avani Williamson MD                      8/29/2017

## 2017-08-29 NOTE — PROGRESS NOTES
physical Therapy EVALUATION/DISCHARGE  Patient: Pepe Alarcon (61 y.o. male)  Date: 8/29/2017  Primary Diagnosis: Alcohol withdrawal seizure ([facility]  Alcoholism /alcohol abuse ([facility] [facility]  Accelerated hypertension        Precautions:      ASSESSMENT :  Based on the objective data described below, the patient presents with independent mobility and ambulation. Patient has improved since his admission and now is up ad kristal in the room. Ambulated 250 feet independently with no AD and ascended and descended 13 steps using the left railing. No balance loss. Steady. No further PT needs. Ok for disch per PT. Skilled physical therapy is not indicated at this time. PLAN :  Discharge Recommendations: None  Further Equipment Recommendations for Discharge: none     SUBJECTIVE:   Patient stated I am much better now. I wasn't steady at first but now I am better.     OBJECTIVE DATA SUMMARY:   HISTORY:    Past Medical History:   Diagnosis Date    Alcoholism ([facility]     Hypertension     Ill-defined condition     Hernia     Seizures ([facility] [facility]     Withdrawal      Past Surgical History:   Procedure Laterality Date    HX HERNIA REPAIR       Prior Level of Function/Home Situation: independent ambulation with no AD. Personal factors and/or comorbidities impacting plan of care:     Home Situation  Home Environment: Private residence  # Steps to Enter: 20  One/Two Story Residence: Two story  Living Alone: No  Support Systems: Spouse/Significant Other/Partner  Patient Expects to be Discharged to[de-identified] Private residence  Current DME Used/Available at Home: None    EXAMINATION/PRESENTATION/DECISION MAKING:   Critical Behavior:  Neurologic State: Alert  Orientation Level: Appropriate for age, Oriented X4  Cognition: Appropriate decision making  Safety/Judgement: Awareness of environment  Hearing: Auditory  Auditory Impairment: None  Skin:  Per nursing. Edema: per nursing.    Range Of Motion:  AROM: Within functional limits           PROM: Within functional limits           Strength:    Strength: Within functional limits                    Tone & Sensation:   Tone: Normal              Sensation: Intact               Coordination:  Coordination: Within functional limits  Vision:      Functional Mobility:  Bed Mobility:              Transfers:  Sit to Stand: Independent  Stand to Sit: Independent                       Balance:   Sitting: Intact  Standing: Intact  Ambulation/Gait Training:  Distance (ft): 250 Feet (ft)  Assistive Device: Other (comment) (none)  Ambulation - Level of Assistance: Independent     Gait Description (WDL): Exceptions to WDL                                           No gait deficits. Steady with no AD. Stairs:  Number of Stairs Trained: 12  Stairs - Level of Assistance: Independent   Rail Use: Left     Functional Measure:  Barthel Index:    Bathin  Bladder: 10  Bowels: 10  Groomin  Dressing: 10  Feeding: 10  Mobility: 15  Stairs: 10  Toilet Use: 10  Transfer (Bed to Chair and Back): 15  Total: 100       Barthel and G-code impairment scale:  Percentage of impairment CH  0% CI  1-19% CJ  20-39% CK  40-59% CL  60-79% CM  80-99% CN  100%   Barthel Score 0-100 100 99-80 79-60 59-40 20-39 1-19   0   Barthel Score 0-20 20 17-19 13-16 9-12 5-8 1-4 0      The Barthel ADL Index: Guidelines  1. The index should be used as a record of what a patient does, not as a record of what a patient could do. 2. The main aim is to establish degree of independence from any help, physical or verbal, however minor and for whatever reason. 3. The need for supervision renders the patient not independent. 4. A patient's performance should be established using the best available evidence. Asking the patient, friends/relatives and nurses are the usual sources, but direct observation and common sense are also important. However direct testing is not needed.   5. Usually the patient's performance over the preceding 24-48 hours is important, but occasionally longer periods will be relevant. 6. Middle categories imply that the patient supplies over 50 per cent of the effort. 7. Use of aids to be independent is allowed. Jazmyn Bunch., Barthel, D.W. (4378). Functional evaluation: the Barthel Index. [facility] (14)2. JESSI Lewis, Reena Ssoa., Alvino Reyes., Gypsy Conrad, [facility] (1999). Measuring the change indisability after inpatient rehabilitation; comparison of the responsiveness of the Barthel Index and Functional [place] Measure. Journal of Neurology, Neurosurgery, and Psychiatry, 66(4), 932-164. Cedric Patience, N.J.A, KAYCEE Irving, & Herbert Ramachandran MCARLOS. (2004.) Assessment of post-stroke quality of life in cost-effectiveness studies: The usefulness of the Barthel Index and the EuroQoL-5D. Quality of Life Research, 13, 653-66       G codes: In compliance with CMSs Claims Based Outcome Reporting, the following G-code set was chosen for this patient based on their primary functional limitation being treated: The outcome measure chosen to determine the severity of the functional limitation was the Barthel with a score of 100/100 which was correlated with the impairment scale. ? Mobility - Walking and Moving Around:     - CURRENT STATUS: CH - 0% impaired, limited or restricted    - GOAL STATUS: CH - 0% impaired, limited or restricted    - D/C STATUS:  CH - 0% impaired, limited or restricted        Pain:Pain Scale 1: Numeric (0 - 10)  Pain Intensity 1: 0     Activity Tolerance:   Good. Please refer to the flowsheet for vital signs taken during this treatment.   After treatment:   [x]   Patient left in no apparent distress sitting up in chair  []   Patient left in no apparent distress in bed  [x]   Call bell left within reach  [x]   Nursing notified  []   Caregiver present  []   Bed alarm activated    COMMUNICATION/EDUCATION:   Communication/Collaboration:  [x]   Fall prevention education was provided and the patient/caregiver indicated understanding. [x]   Patient/family have participated as able and agree with findings and recommendations. []   Patient is unable to participate in plan of care at this time.   Findings and recommendations were discussed with: Registered Nurse    Thank you for this referral.  Stephen Wiley, PT   Time Calculation: 15 mins

## 2017-08-29 NOTE — ED NOTES
Patient presents to ED with C/O a seizure that started around 1am and 1:30 am. The pt's wife stated she was awakened by the pt moaning, shaking, and rigid in the bed. The pt stated he does not remember having the seizure. The pt has hx of a seizures due to ETOH withdrawal. Patient is A&Ox3, side rails up, call bell w/in reach, and aware of plan of care. The patient is in NAD.

## 2017-08-29 NOTE — PROGRESS NOTES
Bedside shift change report given to EDISON Paulino RN (oncoming nurse) by Sumanth Burnette RN (offgoing nurse). Report included the following information SBAR, Kardex, ED Summary, MAR, Accordion and Cardiac Rhythm SR.         [facility] SHIFT NURSING NOTE      Bedside shift change report given to RN (oncoming nurse) by EDISON Paulino RN(offgoing nurse). Report included the following information SBAR, Kardex, ED Summary, MAR, Accordion and Cardiac Rhythm SR.    SHIFT SUMMARY:         Admission Date 8/29/2017   Admission Diagnosis Alcohol withdrawal seizure ([facility] [address]  Alcoholism /alcohol abuse ([facility] [address]  Accelerated hypertension   Consults None        Consults   [x] PT   [x] OT   [] Speech   [] Palliative      [] Hospice    [x] Case Management   [] None   Cardiac Monitoring   [x] Yes   [] No     Antibiotics   [] Yes   [x] No   GI Prophylaxis  (Ex: Protonix, Pepcid, etc,.)   [x] Yes   [] No          DVT Prophylaxis   SCDs:             Solis stockings:         [x] Medication (Ex: Lovenox, Eliquis, Brilinta, Coumadin,  Heparin, etc..)   [] Contraindicated   [] No VTE needed       Urinary Catheter             LDAs               Peripheral IV 08/29/17 Right Hand (Active)   Site Assessment Clean, dry, & intact 8/29/2017  4:00 PM   Phlebitis Assessment 0 8/29/2017  4:00 PM   Infiltration Assessment 0 8/29/2017  4:00 PM   Dressing Status Clean, dry, & intact 8/29/2017  4:00 PM   Dressing Type Transparent;Tape;Sumit 8/29/2017  4:00 PM   Hub Color/Line Status Pink 8/29/2017  4:00 PM   Alcohol Cap Used No 8/29/2017  4:00 PM                      I/Os   Intake/Output Summary (Last 24 hours) at 08/29/17 1704  Last data filed at 08/29/17 0554   Gross per 24 hour   Intake                0 ml   Output              350 ml   Net             -350 ml         Activity Level Activity Level: Up with Assistance     Activity Assistance: Partial (one person)   Diet Active Orders   There are no active orders of the following type(s): Diet. Purposeful Rounding every 1-2 hour?    [x] Yes    Royal Score  Total Score: 1   Bed Alarm (If score 3 or >)   [] Yes    [] Refused (See signed refusal form in chart)   Robinson Score  Robinson Score: 21       Robinson Score (if score 14 or less)   [] PMT consult   [] Nutrition consult   [] Wound Care consult      []  Specialty bed         Influenza Vaccine Received Flu Vaccine for Current Season (usually Sept-March): Not Flu Season               Needs prior to discharge:   Home O2 required:    [] Yes   [x] No     If yes, how much O2 required? Other:    Last Bowel Movement Date: 08/29/17   Readmission Risk Assessment Tool Score Medium Risk            17       Total Score        3 Has Seen PCP in Last 6 Months (Yes=3, No=0)    2 . Living with Significant Other. Assisted Living. LTAC. SNF. or   Rehab    4 IP Visits Last 12 Months (1-3=4, 4=9, >4=11)    8 Charlson Comorbidity Score (Age + Comorbid Conditions)        Criteria that do not apply:    Patient Length of Stay (>5 days = 3)    Pt.  Coverage (Medicare=5 , Medicaid, or Self-Pay=4)       Expected Length of Stay 3d 9h   Actual Length of Stay 0          31 75 62: Primary Nurse Roney Bailey RN performed a dual skin assessment on this patient No impairment noted  Robinson score is 21

## 2017-08-29 NOTE — PROGRESS NOTES
Initial Nutrition Assessment:    INTERVENTIONS/RECOMMENDATIONS:   · Meals/Snacks: General/healthful diet: Continue Cardiac diet as tolerated. ASSESSMENT:   Chart reviewed, medically noted for alcohol withdrawal seizures. Significant PMH includes ARF, HTN, CVA. Visited pt at bedside: pt reports good appetite while in hospital, consumed 100% of breakfast. PTA he says he does not eat anything while he is drinking alcohol. He says he hasn't eaten anything in the past 2 days PTA. Reduced intake has resulted in 8 lb. weight loss over past 2 weeks. Educated pt on nutritional implications of alcohol abuse and effects on liver. Explained room service and encouraged pt to order meals. Will monitor PO intake. Diet Order: Cardiac  % Eaten:  No data found. Pertinent Medications: [x]Reviewed []Other: Z47, Folic acid, Ativan, Zofran, Thiamine  Pertinent Labs: [x]Reviewed []Other:  Food Allergies: [x]None []Other   Last BM: 8/29   [x]Active     []Hyperactive  []Hypoactive       [] Absent BS  Skin:    [x] Intact   [] Incision  [] Breakdown  [] Other:    Anthropometrics:   Height: 5' 9\" (175.3 cm) Weight: 77.2 kg (170 lb 3.1 oz)   IBW (%IBW):   ( ) UBW (%UBW):   (  %)   Last Weight Metrics:  Weight Loss Metrics 8/29/2017 7/15/2017 2/1/2017 1/24/2017 11/3/2016 9/23/2016 7/11/2016   Today's Wt 170 lb 3.1 oz 183 lb 3.2 oz 173 lb 11.6 oz 174 lb 9.7 oz 163 lb 179 lb 3.7 oz 183 lb 13.8 oz   BMI 25.13 kg/m2 27.05 kg/m2 25.65 kg/m2 25.78 kg/m2 24.07 kg/m2 26.47 kg/m2 27.14 kg/m2       BMI: Body mass index is 25.13 kg/(m^2). This BMI is indicative of:   []Underweight    []Normal    [x]Overweight    [] Obesity   [] Extreme Obesity (BMI>40)     Estimated Nutrition Needs (Based on):   2028 Kcals/day (BMR 1560x1. 3(AF)) , 77.2 g (1.0 g/kg BW) Protein  Carbohydrate:  At Least 130 g/day  Fluids: 2000 mL/day (1ml/kcal)    Pt expected to meet estimated nutrient needs: [x]Yes []No    NUTRITION DIAGNOSES:   Problem:  Inadequate energy intake      Etiology: related to poor appetite secondary to alcohol abuse     Signs/Symptoms: as evidenced by wt loss of 8 lb in last few weeks, pt reported decreased PO intake. NUTRITION INTERVENTIONS:  Meals/Snacks: General/healthful diet                  GOAL:   Consume >50% of meals over next 3-5 days. LEARNING NEEDS (Diet, Food/Nutrient-Drug Interaction):    [] None Identified   [x] Identified and Education Provided/Documented: Provided education on effects of alcohol on nutritional status. [] Identified and Pt declined/was not appropriate      Cultureal, Roman Catholic, OR Ethnic Dietary Needs:    [x] None Identified   [] Identified and Addressed     [x] Interdisciplinary Care Plan Reviewed/Documented    [x] Discharge Planning: Increase PO intake and reduce alcohol consumption. MONITORING /EVALUATION:      Food/Nutrient Intake Outcomes:  Total energy intake  Physical Signs/Symptoms Outcomes: Weight/weight change, GI profile    NUTRITION RISK:    [] High              [x] Moderate           []  Low  []  Minimal/Uncompromised    PT SEEN FOR:    []  MD Consult: []Calorie Count      []Diabetic Diet Education        []Diet Education     []Electrolyte Management     []General Nutrition Management and Supplements     []Management of Tube Feeding     []TPN Recommendations    [x]  RN Referral:  [x]MST score >=2     []Enteral/Parenteral Nutrition PTA     []Pregnant: Gestational DM or Multigestation     []Pressure Ulcer/Wound Care needs        []  Low BMI  []  DTR Referral       Haydee Balderrama V  Dietetic Intern

## 2017-08-29 NOTE — PROGRESS NOTES
Pt is a 59 y/o [facility] [employer] male admitted for alcohol withdrawal deizure, alsocholism/alcohol abuse and accelerated hypertension. CM completing assessment, d/c planning. Pt verified demographic information. Pt lives in a two story home with no steps to enter. Pt does go up 12 steps to get to second floor. Pt lives with his spouse, Jose Caba. Pt is indepenedent in ADL/IADL needs to include driving. Pt has supportive family to assist with transportation as needed, to include d/c. Pt does not have access to any DME in home. Pt has not utilized SNF or HH prior to admission. Per CM consult, CM inquired re: outpatient services for alcoholism. Pt has utilized outpatient rehab before from [facility]. Pt stated he does not want a referral to outpatient services at this time, as \"I know what I need to do and what they will tell me. \" CM offered information re: AA meetings. Pt agreed to receiving information for AA meetings. CM to provide pt with a list of AA meetings in his area. Pt stated he had no further concerns or needs at this time. CM to continue to offer support, d/c planning as needed. Care Management Interventions  PCP Verified by CM: Yes  Mode of Transport at Discharge: Other (see comment) (Pt wife will assist with transport for d/c)  Transition of Care Consult (CM Consult): Discharge Planning (Pt plan is to return to own home at d/c. Pt is independent in ADL/IADL needs to include driving.)  Discharge Durable Medical Equipment: No (Pt does not have access to equipment in the home)  Physical Therapy Consult: No  Occupational Therapy Consult: No  Current Support Network: Lives with Spouse, Own Home (Pt lives in a two story home with no steps to enter the home.  There are 12 steps to enter the second floor)  Confirm Follow Up Transport: Family (Pt wife to assist with transportation at d/c)  Plan discussed with Pt/Family/Caregiver: Yes (Plan discussed with pt)  Discharge Location  Discharge Placement: (TBD)    YUSEF Becker

## 2017-08-29 NOTE — ED NOTES
TRANSFER - OUT REPORT:    Verbal report given to Bryce Celeste RN (name) on Cliff Marquez  being transferred to [address] [place] [address] [address] for routine progression of care       Report consisted of patients Situation, Background, Assessment and   Recommendations(SBAR). Information from the following report(s) SBAR, Kardex, ED Summary, Intake/Output, MAR, Recent Results and Cardiac Rhythm NSR to Sinus Tach was reviewed with the receiving nurse. Lines:   Peripheral IV 08/29/17 Left Hand (Active)   Site Assessment Clean, dry, & intact 8/29/2017  2:46 AM   Phlebitis Assessment 0 8/29/2017  2:46 AM   Infiltration Assessment 0 8/29/2017  2:46 AM   Dressing Status Clean, dry, & intact 8/29/2017  2:46 AM   Dressing Type Transparent 8/29/2017  2:46 AM   Hub Color/Line Status Blue;Flushed 8/29/2017  2:46 AM   Action Taken Dressing reinforced 8/29/2017  2:46 AM        Opportunity for questions and clarification was provided. Patient transported with:   Tech   Ordered cardiac monitoring off the floor without tele or nurse. Dual RN skin assessment to be completed by receiving RN. Belongings & chart transferred with patient to floor.

## 2017-08-29 NOTE — H&P
Hospitalist Admission Note    NAME: Gerry Valencia   :  1955   MRN:  [id number]     Date/Time:  2017 6:14 AM    Patient PCP: Burgess Terrell MD  ________________________________________________________________________    My assessment of this patient's clinical condition and my plan of care is as follows. Assessment / Plan:  Alcohol withdrawal seizure POA  Chronic Heavy Alcoholism  Fatty Liver/elevated LFTs POA due to above  CT head neg  TBili= 1.8    Admit to telemetry bed  Load with PO Valium x 3 doses then PO Ativan prn as per the CIWA scoring protocol  IV ativan prn seizures only  Goody bag x 1 in ER, cont PO FA, B12, Thiamine  Seizure precautions  CM consult for - OP alcohol rehab options on discharge   Cessation counseling given in ER- pt seems to be motivated to quit this time    Accelerated HTN  H/o CVA    Cont atenolol, HCTZ      Code Status: Full  Surrogate Decision Maker: wife Joe Vergara # [phone]    DVT Prophylaxis: Sq lovenox  GI Prophylaxis: IV pepcid    Baseline: Pt is independent at home with wife        Subjective:   CHIEF COMPLAINT: Seizure at home    HISTORY OF PRESENT ILLNESS:     Gerry Valencia is a 58 y.o.  male who presents with above complains from home ambulatory. Pt presents with CC of alcohol withdrawal seizure x 1 yesterday AM.  H/o similar seizure episodes when pt cuts down on alcohol - trying to quit  Last alcoholic drink was yesterday PM (1 beer) after all day of no drinking as per the wife. H/o binge alcohol use over the weekend - all weekend as per the wife- has a history of binge drinking occasionally. Pt has desired to stop drinking & so had cut down on it- which gave him seizure yesterday AM.    Pt still having high CIWA score in ER when seen by me. We were asked to admit for work up and evaluation of the above problems.      Past Medical History:   Diagnosis Date    Alcoholism ([address]     Hypertension     Ill-defined condition Hernia     Seizures ([facility] [address]     Withdrawal         Past Surgical History:   Procedure Laterality Date    HX HERNIA REPAIR         Social History   Substance Use Topics    Smoking status: Never Smoker    Smokeless tobacco: Never Used    Alcohol use Yes      Comment: 1/2-1 pint vodka/day +/- beer        Family History   Problem Relation Age of Onset    Hypertension Other     Breast Cancer Other      Allergies   Allergen Reactions    Lisinopril Angioedema        Prior to Admission medications    Medication Sig Start Date End Date Taking? Authorizing Provider   aspirin 81 mg chewable tablet Take 1 Tab by mouth daily. 7/16/17   Kelsey Roblero MD   atenolol (TENORMIN) 50 mg tablet Take 1 Tab by mouth daily. 7/16/17   Kelsey Roblero MD   folic acid (FOLVITE) 1 mg tablet Take 1 Tab by mouth daily. 7/16/17   Kelsey Roblero MD   LORazepam (ATIVAN) 2 mg tablet Take 1 Tab by mouth Multiple. Take 1 tab po q6h  for 3 days then   Take 1 tab po q8h for 3 days then  Take 1 tab po q12H for 3 days then  Take 1 tab po daily for 3 days then stop 7/16/17   Kelsey Roblero MD   magnesium oxide (MAG-OX) 400 mg tablet Take 1 Tab by mouth two (2) times a day. 7/16/17   Kelsey Roblero MD   thiamine (B-1) 100 mg tablet Take 1 Tab by mouth daily. 7/16/17   Kelsey Roblero MD   hydroCHLOROthiazide (HYDRODIURIL) 12.5 mg tablet Take 1 Tab by mouth daily.  7/16/17   Kelsey Roblero MD       REVIEW OF SYSTEMS:       Total of 12 systems reviewed as follows:       POSITIVE= underlined text  Negative = text not underlined  General:  fever, chills, sweats, generalized weakness, weight loss/gain,      loss of appetite   Eyes:    blurred vision, eye pain, loss of vision, double vision  ENT:    rhinorrhea, pharyngitis   Respiratory:   cough, sputum production, SOB, AMEZQUITA, wheezing, pleuritic pain   Cardiology:   chest pain, palpitations, orthopnea, PND, edema, syncope   Gastrointestinal:  abdominal pain , N/V, diarrhea, dysphagia, constipation, bleeding   Genitourinary:  frequency, urgency, dysuria, hematuria, incontinence   Muskuloskeletal :  arthralgia, myalgia, back pain  Hematology:  easy bruising, nose or gum bleeding, lymphadenopathy   Dermatological: rash, ulceration, pruritis, color change / jaundice  Endocrine:   hot flashes or polydipsia   Neurological:  headache, dizziness, confusion, focal weakness, paresthesia,     Speech difficulties, memory loss, gait difficulty  Psychological: Feelings of anxiety, depression, agitation    Objective:   VITALS:    Visit Vitals    BP (!) 147/92 (BP 1 Location: Right arm, BP Patient Position: At rest)    Pulse 79    Temp 98.6 °F (37 °C)    Resp 24    Ht 5' 9\" (1.753 m)    Wt 77.2 kg (170 lb 3.1 oz)    SpO2 98%    BMI 25.13 kg/m2       PHYSICAL EXAM:    General:    Alert, cooperative, no distress, appears stated age. HEENT: Atraumatic, anicteric sclerae, pink conjunctivae     No oral ulcers, mucosa moist, throat clear, dentition fair  Neck:  Supple, symmetrical,  thyroid: non tender  Lungs:   Clear to auscultation bilaterally. No Wheezing or Rhonchi. No rales. Chest wall:  No tenderness  No Accessory muscle use. Heart:   Regular  rhythm,  No  murmur   No edema  Abdomen:   Soft, non-tender. Not distended. Bowel sounds normal  Extremities: No cyanosis. No clubbing,      Skin turgor normal, Capillary refill normal, Radial dial pulse 2+  Skin:     Not pale. Not Jaundiced  No rashes   Psych:  Good insight. Not depressed. Not anxious or agitated. Neurologic: EOMs intact. No facial asymmetry. No aphasia or slurred speech. Symmetrical strength, Sensation grossly intact.  Alert and oriented X 4.     _______________________________________________________________________  Care Plan discussed with:    Comments   Patient x    Family      RN x    Care Manager                    Consultant:  x ISABEL Maurice   _______________________________________________________________________  Expected Disposition:   Home with Family x   HH/PT/OT/RN ?   SNF/LTC    CLAUDIO    ________________________________________________________________________  TOTAL TIME:  54 Minutes    Critical Care Provided     Minutes non procedure based      Comments    x Reviewed previous records   >50% of visit spent in counseling and coordination of care x Discussion with patient and questions answered       ________________________________________________________________________  Signed: Mary Ellen Rees MD    Procedures: see electronic medical records for all procedures/Xrays and details which were not copied into this note but were reviewed prior to creation of Plan. LAB DATA REVIEWED:    Recent Results (from the past 24 hour(s))   ETHYL ALCOHOL    Collection Time: 08/29/17  2:18 AM   Result Value Ref Range    ALCOHOL(ETHYL),SERUM <10 <10 MG/DL   CBC WITH AUTOMATED DIFF    Collection Time: 08/29/17  2:18 AM   Result Value Ref Range    WBC 4.1 4.1 - 11.1 K/uL    RBC 3.69 (L) 4.10 - 5.70 M/uL    HGB 12.7 12.1 - 17.0 g/dL    HCT 37.7 36.6 - 50.3 %    .2 (H) 80.0 - 99.0 FL    MCH 34.4 (H) 26.0 - 34.0 PG    MCHC 33.7 30.0 - 36.5 g/dL    RDW 12.1 11.5 - 14.5 %    PLATELET 268 743 - 653 K/uL    NEUTROPHILS 64 32 - 75 %    LYMPHOCYTES 19 12 - 49 %    MONOCYTES 15 (H) 5 - 13 %    EOSINOPHILS 1 0 - 7 %    BASOPHILS 1 0 - 1 %    ABS. NEUTROPHILS 2.7 1.8 - 8.0 K/UL    ABS. LYMPHOCYTES 0.8 0.8 - 3.5 K/UL    ABS. MONOCYTES 0.6 0.0 - 1.0 K/UL    ABS. EOSINOPHILS 0.0 0.0 - 0.4 K/UL    ABS.  BASOPHILS 0.0 0.0 - 0.1 K/UL    RBC COMMENTS MACROCYTOSIS  1+        DF SMEAR SCANNED     LIPASE    Collection Time: 08/29/17  2:18 AM   Result Value Ref Range    Lipase 202 73 - 194 U/L   METABOLIC PANEL, COMPREHENSIVE    Collection Time: 08/29/17  2:18 AM   Result Value Ref Range    Sodium 134 (L) 136 - 145 mmol/L    Potassium 3.6 3.5 - 5.1 mmol/L    Chloride 98 97 - 108 mmol/L    CO2 29 21 - 32 mmol/L    Anion gap 7 5 - 15 mmol/L    Glucose 139 (H) 65 - 100 mg/dL    BUN 4 (L) 6 - 20 MG/DL    Creatinine 0.83 0.70 - 1.30 MG/DL    BUN/Creatinine ratio 5 (L) 12 - 20      GFR est AA >60 >60 ml/min/1.73m2    GFR est non-AA >60 >60 ml/min/1.73m2    Calcium 9.0 8.5 - 10.1 MG/DL    Bilirubin, total 1.8 (H) 0.2 - 1.0 MG/DL    ALT (SGPT) 35 12 - 78 U/L    AST (SGOT) 41 (H) 15 - 37 U/L    Alk.  phosphatase 62 45 - 117 U/L    Protein, total 8.1 6.4 - 8.2 g/dL    Albumin 3.9 3.5 - 5.0 g/dL    Globulin 4.2 (H) 2.0 - 4.0 g/dL    A-G Ratio 0.9 (L) 1.1 - 2.2     MAGNESIUM    Collection Time: 08/29/17  2:18 AM   Result Value Ref Range    Magnesium 1.7 1.6 - 2.4 mg/dL

## 2017-08-29 NOTE — ED NOTES
Monitor x 3. Call bell in reach. Bed in lowest position, with side rails up x 2. Pt updated on plan of care. Seizure precautions. MD Christie at bedside to update.

## 2017-08-29 NOTE — IP AVS SNAPSHOT
[address] [facility] 
[phone] Patient: Stew Shepherd MRN: [id number] ICA:[id number] You are allergic to the following Allergen Reactions Lisinopril Angioedema Recent Documentation Height Weight BMI Smoking Status 1.753 m 77.2 kg 25.13 kg/m2 Never Smoker Emergency Contacts Name Discharge Info Relation Home Work Mobile [address] CAREGIVER [3] Spouse [3] [phone] About your hospitalization You were admitted on:  August 29, 2017 You last received care in the:  [facility] [place] [facility] NE[facility]OSCI[facility] TELEMET[facility] You were discharged on:  September 2, 2017 Unit phone number:  [phone] Why you were hospitalized Your primary diagnosis was:  Alcohol Withdrawal Seizure (Hcc) Your diagnoses also included:  Alcoholism /Alcohol Abuse (Hcc), Accelerated Hypertension, Hepatic Steatosis, Chronic Alcoholism (Hcc) Providers Seen During Your Hospitalizations Provider Role Specialty Primary office phone Korina Mejia, DO Attending Provider Emergency Medicine [phone] Kristy Zendejas MD Attending Provider Internal Medicine [phone] Your Primary Care Physician (PCP) Primary Care Physician Office Phone Office Fax Maria T Alan JAMES [phone] Follow-up Information Follow up With Details Comments Contact Info Bri Bui MD Go on 9/6/2017 PCP follow up at 3:15 PM Juanjose Tomlin. 
 
[facility] [phone] 
[phone] Substance Abuse Resources  You have been given a packet of substance abuse resources. Please call the number listed above for information about AA meetings in your area. Alcoholics Anonymous [facility] Hotline- [phone] Current Discharge Medication List  
  
CONTINUE these medications which have NOT CHANGED Dose & Instructions Dispensing Information Comments Morning Noon Evening Bedtime  
 hydroCHLOROthiazide 12.5 mg tablet Commonly known as:  HYDRODIURIL Your last dose was: Your next dose is:    
   
   
 Dose:  12.5 mg Take 1 Tab by mouth daily. Quantity:  30 Tab Refills:  1 Discharge Instructions None Discharge Orders None [facility] Announcement We are excited to announce that we are making your provider's discharge notes available to you in [employer]. You will see these notes when they are completed and signed by the physician that discharged you from your recent hospital stay. If you have any questions or concerns about any information you see in [employer], please call the Health Information Department where you were seen or reach out to your Primary Care Provider for more information about your plan of care. Introducing [facility] SERVICES! Willadean Saint introduces [employer] patient portal. Now you can access parts of your medical record, email your doctor's office, and request medication refills online. 1. In your internet browser, go to [email or web address] 2. Click on the First Time User? Click Here link in the Sign In box. You will see the New Member Sign Up page. 3. Enter your [employer] Access Code exactly as it appears below. You will not need to use this code after youve completed the sign-up process. If you do not sign up before the expiration date, you must request a new code. · [employer] Access Code: [email or web address] Expires: 10/7/2017  9:46 AM 
 
4. Enter the last four digits of your Social Security Number (xxxx) and Date of Birth (mm/dd/yyyy) as indicated and click Submit. You will be taken to the next sign-up page. 5. Create a [employer] ID. This will be your [employer] login ID and cannot be changed, so think of one that is secure and easy to remember. 6. Create a [employer] password. You can change your password at any time. 7. Enter your Password Reset Question and Answer. This can be used at a later time if you forget your password. 8. Enter your e-mail address. You will receive e-mail notification when new information is available in [address] 9. Click Sign Up. You can now view and download portions of your medical record. 10. Click the Download Summary menu link to download a portable copy of your medical information. If you have questions, please visit the Frequently Asked Questions section of the [employer] website. Remember, [employer] is NOT to be used for urgent needs. For medical emergencies, dial 911. Now available from your iPhone and Android! General Information Please provide this summary of care documentation to your next provider. Patient Signature:  ____________________________________________________________ Date:  ____________________________________________________________  
  
Lilia Gillette Provider Signature:  ____________________________________________________________ Date:  ____________________________________________________________

## 2017-08-29 NOTE — ED PROVIDER NOTES
HPI Comments: Bravo Cruz is a 58 y.o. male, pmhx HTN / alcohol abuse / alcoholic seizures, who presents ambulatory to the ED c/o new episode witnessed seizure, that lasted ~1-2 minutes PTA this morning. Wife states the pt's seizure woke her up. She reports the pt was breathing heavily with stiff limbs and tremors. Wife notes the pt was confused when he finally woke up. Pt reports a hx of alcohol induced seizures. He notes his last alcoholic drink (one beer) was x 1900 yesterday evening. Wife states \"he binged all weekend, but didn't drink anything all day yesterday until his one beer in the evening. \" Pt notes he used to take Librium to wean himself off drinking, but hasn't taken it recently. He states \"I know I need to stop drinking, I want to. \" Pt specifically denies any recent hallucinations, SI, HI, fever, chills, nausea, vomiting, diarrhea, abd pain, CP, SOB, lightheadedness, dizziness, numbness, weakness, tingling, BLE swelling, HA, heart palpitations, urinary sxs, changes in BM, changes in PO intake, melena, hematochezia, cough, or congestion. PCP: Isabel Bedoya MD    PMHx: Significant for HTN, alcoholic seizures, hernia, alcoholism  PSHx: Significant for hernia repair  Social Hx: -tobacco, +EtOH (alcohol abuse - vodka / beer), -Illicit Drugs    There are no other complaints, changes, or physical findings at this time. The history is provided by the patient and the spouse. Past Medical History:   Diagnosis Date    Alcoholism ([address]     Hypertension     Ill-defined condition     Hernia     Seizures ([address]     Withdrawal        Past Surgical History:   Procedure Laterality Date    HX HERNIA REPAIR           Family History:   Problem Relation Age of Onset    Hypertension Other     Breast Cancer Other        Social History     Social History    Marital status:      Spouse name: N/A    Number of children: N/A    Years of education: N/A     Occupational History    Not on file. Social History Main Topics    Smoking status: Never Smoker    Smokeless tobacco: Never Used    Alcohol use Yes      Comment: 1/2-1 pint vodka/day +/- beer    Drug use: No    Sexual activity: Not on file     Other Topics Concern    Not on file     Social History Narrative         ALLERGIES: Lisinopril    Review of Systems   Constitutional: Negative for fatigue and fever. HENT: Negative for congestion and sore throat. Eyes: Negative for visual disturbance. Respiratory: Negative for cough and shortness of breath. Cardiovascular: Negative for chest pain and leg swelling. Gastrointestinal: Negative for abdominal pain, blood in stool, diarrhea and nausea. Endocrine: Negative for polyuria. Genitourinary: Negative for dysuria and testicular pain. Musculoskeletal: Negative for joint swelling and myalgias. Skin: Negative for rash. Allergic/Immunologic: Negative for immunocompromised state. Neurological: Positive for seizures. Negative for numbness and headaches. Hematological: Does not bruise/bleed easily. Psychiatric/Behavioral: Negative for behavioral problems, confusion and dysphoric mood. Vitals:    08/29/17 0218 08/29/17 0250 08/29/17 0300 08/29/17 0320   BP: (!) 148/93 (!) 132/93 129/81 125/77   Pulse: (!) 102 97 100 87   Resp: 21 23 26 21   Temp:    99.1 °F (37.3 °C)   SpO2: 98% 98% 97% 91%   Weight:       Height:                Physical Exam   Constitutional: He is oriented to person, place, and time. He appears well-developed and well-nourished. HENT:   Head: Normocephalic and atraumatic. Mouth/Throat: Oropharynx is clear and moist.   Tongue fasciculations   Eyes: Conjunctivae and EOM are normal. Pupils are equal, round, and reactive to light. Neck: Normal range of motion. Neck supple. No tracheal deviation present. Cardiovascular: Normal rate, regular rhythm, normal heart sounds and intact distal pulses. No murmur heard.   Pulmonary/Chest: Effort normal and breath sounds normal. No respiratory distress. He has no wheezes. He has no rales. Abdominal: Soft. Bowel sounds are normal. He exhibits no distension. There is no tenderness. There is no rebound and no guarding. Easily reducible umbilical hernia   Musculoskeletal: He exhibits no edema or deformity. Neurological: He is alert and oriented to person, place, and time. GCS eye subscore is 4. GCS verbal subscore is 5. GCS motor subscore is 6. Tremor; no asterixis. No facial asymmetry. Equal strength in the upper and lower extremities. Skin: Skin is warm and dry. Psychiatric: He has a normal mood and affect. His speech is normal.   Nursing note and vitals reviewed. Written by Jenny Fernández ED Scribe, as dictated by Patricia Pena DO         MDM  Number of Diagnoses or Management Options  Alcohol withdrawal seizure, uncomplicated [facility]):   Diagnosis management comments:   DDx: alcohol induced seizure disorder, alcohol abuse, delirium tremens, closed head injury possible, epileptic seizure    Patient has a high CIWA score. I do not think he is safe for discharge to home and outpatient management of etoh withdrawal.        Amount and/or Complexity of Data Reviewed  Clinical lab tests: reviewed and ordered  Tests in the radiology section of CPT®: ordered and reviewed  Obtain history from someone other than the patient: yes (Wife)  Review and summarize past medical records: yes  Discuss the patient with other providers: yes (Hospitalist)  Independent visualization of images, tracings, or specimens: yes        Procedures      CONSULT NOTE:   3:39 AM  Patricia Pena DO spoke with Dr. Roque Abbott,   Specialty: Hospitalist  Discussed pt's hx, disposition, and available diagnostic and imaging results. Reviewed care plans. Consultant will evaluate pt for admission.   Written by Jenny Fernández, ED Scribe, as dictated by Patricia Pena DO       LABORATORY TESTS:  Recent Results (from the past 12 hour(s))   ETHYL ALCOHOL Collection Time: 08/29/17  2:18 AM   Result Value Ref Range    ALCOHOL(ETHYL),SERUM <10 <10 MG/DL   CBC WITH AUTOMATED DIFF    Collection Time: 08/29/17  2:18 AM   Result Value Ref Range    WBC 4.1 4.1 - 11.1 K/uL    RBC 3.69 (L) 4.10 - 5.70 M/uL    HGB 12.7 12.1 - 17.0 g/dL    HCT 37.7 36.6 - 50.3 %    .2 (H) 80.0 - 99.0 FL    MCH 34.4 (H) 26.0 - 34.0 PG    MCHC 33.7 30.0 - 36.5 g/dL    RDW 12.1 11.5 - 14.5 %    PLATELET 005 810 - 763 K/uL    NEUTROPHILS 64 32 - 75 %    LYMPHOCYTES 19 12 - 49 %    MONOCYTES 15 (H) 5 - 13 %    EOSINOPHILS 1 0 - 7 %    BASOPHILS 1 0 - 1 %    ABS. NEUTROPHILS 2.7 1.8 - 8.0 K/UL    ABS. LYMPHOCYTES 0.8 0.8 - 3.5 K/UL    ABS. MONOCYTES 0.6 0.0 - 1.0 K/UL    ABS. EOSINOPHILS 0.0 0.0 - 0.4 K/UL    ABS. BASOPHILS 0.0 0.0 - 0.1 K/UL    RBC COMMENTS MACROCYTOSIS  1+        DF SMEAR SCANNED     LIPASE    Collection Time: 08/29/17  2:18 AM   Result Value Ref Range    Lipase 202 73 - 681 U/L   METABOLIC PANEL, COMPREHENSIVE    Collection Time: 08/29/17  2:18 AM   Result Value Ref Range    Sodium 134 (L) 136 - 145 mmol/L    Potassium 3.6 3.5 - 5.1 mmol/L    Chloride 98 97 - 108 mmol/L    CO2 29 21 - 32 mmol/L    Anion gap 7 5 - 15 mmol/L    Glucose 139 (H) 65 - 100 mg/dL    BUN 4 (L) 6 - 20 MG/DL    Creatinine 0.83 0.70 - 1.30 MG/DL    BUN/Creatinine ratio 5 (L) 12 - 20      GFR est AA >60 >60 ml/min/1.73m2    GFR est non-AA >60 >60 ml/min/1.73m2    Calcium 9.0 8.5 - 10.1 MG/DL    Bilirubin, total 1.8 (H) 0.2 - 1.0 MG/DL    ALT (SGPT) 35 12 - 78 U/L    AST (SGOT) 41 (H) 15 - 37 U/L    Alk. phosphatase 62 45 - 117 U/L    Protein, total 8.1 6.4 - 8.2 g/dL    Albumin 3.9 3.5 - 5.0 g/dL    Globulin 4.2 (H) 2.0 - 4.0 g/dL    A-G Ratio 0.9 (L) 1.1 - 2.2         IMAGING RESULTS:     CT Results  (Last 48 hours)               08/29/17 0312  CT HEAD WO CONT Final result    Impression:  IMPRESSION:        Chronic left basal ganglia lacunar infarct.  No acute intracranial abnormality Narrative:  EXAM:  CT HEAD WO CONT       INDICATION:   Seizure, new, >39yo, no trauma       COMPARISON: None. CONTRAST:  None. TECHNIQUE: Unenhanced CT of the head was performed using 5 mm images. Brain and   bone windows were generated. CT dose reduction was achieved through use of a   standardized protocol tailored for this examination and automatic exposure   control for dose modulation. FINDINGS:   The ventricles and sulci are normal in size, shape and configuration and   midline. Chronic left basal ganglia lacunar infarct. There is no intracranial   hemorrhage, extra-axial collection, mass, mass effect or midline shift. The   basilar cisterns are open. No acute infarct is identified. The bone windows   demonstrate no abnormalities. The visualized portions of the paranasal sinuses   and mastoid air cells are clear. MEDICATIONS GIVEN:  Medications   0.9% sodium chloride 1,000 mL with mvi, adult no. 4 with vit K 10 mL, thiamine 522 mg, folic acid 1 mg infusion (not administered)   LORazepam (ATIVAN) injection 2 mg (2 mg IntraVENous Given 8/29/17 0246)       IMPRESSION:  1. Alcohol withdrawal seizure, uncomplicated ([facility] [address]        PLAN:  1. Admit to hospitalist    ADMISSION NOTE:  3:40 AM  Patient is being admitted to the hospital by Dr. Francisco Garcia. The results of their tests and reasons for their admission have been discussed with them and/or available family. They convey agreement and understanding for the need to be admitted and for their admission diagnosis. Written by Bal Olson ED Scribe, as dictated by Mar Aponte DO. This note is prepared by Dash Garcia, acting as Scribe for DO Mar Forman DO : The scribe's documentation has been prepared under my direction and personally reviewed by me in its entirety. I confirm that the note above accurately reflects all work, treatment, procedures, and medical decision making performed by me.

## 2017-08-29 NOTE — PROGRESS NOTES
Problem: Falls - Risk of  Goal: *Absence of Falls  Document Royal Fall Risk and appropriate interventions in the flowsheet.    Outcome: Progressing Towards Goal  Fall Risk Interventions:  Mobility Interventions: Bed/chair exit alarm, Patient to call before getting OOB           Medication Interventions: Bed/chair exit alarm, Patient to call before getting OOB

## 2017-08-29 NOTE — ED NOTES
Bedside and Verbal shift change report given to RICARDO Valderrama (oncoming nurse) by Josep Shelby (offgoing nurse). Report included the following information SBAR, ED Summary, Intake/Output, MAR and Recent Results. Monitor x 0. Call bell in reach. Bed in lowest position, with side rails up x 2. Pt updated on plan of care. Currently in CT. Family at bedside.

## 2017-08-29 NOTE — ED NOTES
Clarified Valium order 20mg PO w/ MD Jalil Gil, per MD to give Valium as order as it is loading dose for etoh w/d. Once loading dose is completed then CIWA protocol is to be initiated per MD Jalil Gil.

## 2017-08-30 LAB
ALBUMIN SERPL-MCNC: 3.6 G/DL (ref 3.5–5)
ALBUMIN/GLOB SERPL: 0.8 {RATIO} (ref 1.1–2.2)
ALP SERPL-CCNC: 113 U/L (ref 45–117)
ALT SERPL-CCNC: 33 U/L (ref 12–78)
ANION GAP SERPL CALC-SCNC: 6 MMOL/L (ref 5–15)
AST SERPL-CCNC: 38 U/L (ref 15–37)
BASOPHILS # BLD: 0 K/UL (ref 0–0.1)
BASOPHILS NFR BLD: 1 % (ref 0–1)
BILIRUB SERPL-MCNC: 1.4 MG/DL (ref 0.2–1)
BUN SERPL-MCNC: 6 MG/DL (ref 6–20)
BUN/CREAT SERPL: 8 (ref 12–20)
CALCIUM SERPL-MCNC: 9.3 MG/DL (ref 8.5–10.1)
CHLORIDE SERPL-SCNC: 99 MMOL/L (ref 97–108)
CO2 SERPL-SCNC: 28 MMOL/L (ref 21–32)
CREAT SERPL-MCNC: 0.79 MG/DL (ref 0.7–1.3)
EOSINOPHIL # BLD: 0.1 K/UL (ref 0–0.4)
EOSINOPHIL NFR BLD: 3 % (ref 0–7)
ERYTHROCYTE [DISTWIDTH] IN BLOOD BY AUTOMATED COUNT: 11.9 % (ref 11.5–14.5)
GLOBULIN SER CALC-MCNC: 4.3 G/DL (ref 2–4)
GLUCOSE SERPL-MCNC: 109 MG/DL (ref 65–100)
HCT VFR BLD AUTO: 38.7 % (ref 36.6–50.3)
HGB BLD-MCNC: 12.4 G/DL (ref 12.1–17)
LYMPHOCYTES # BLD: 1 K/UL (ref 0.8–3.5)
LYMPHOCYTES NFR BLD: 35 % (ref 12–49)
MAGNESIUM SERPL-MCNC: 2 MG/DL (ref 1.6–2.4)
MCH RBC QN AUTO: 33.2 PG (ref 26–34)
MCHC RBC AUTO-ENTMCNC: 32 G/DL (ref 30–36.5)
MCV RBC AUTO: 103.8 FL (ref 80–99)
MONOCYTES # BLD: 0.5 K/UL (ref 0–1)
MONOCYTES NFR BLD: 16 % (ref 5–13)
NEUTS SEG # BLD: 1.3 K/UL (ref 1.8–8)
NEUTS SEG NFR BLD: 45 % (ref 32–75)
PLATELET # BLD AUTO: 205 K/UL (ref 150–400)
POTASSIUM SERPL-SCNC: 3.5 MMOL/L (ref 3.5–5.1)
PROT SERPL-MCNC: 7.9 G/DL (ref 6.4–8.2)
RBC # BLD AUTO: 3.73 M/UL (ref 4.1–5.7)
SODIUM SERPL-SCNC: 133 MMOL/L (ref 136–145)
WBC # BLD AUTO: 2.8 K/UL (ref 4.1–11.1)

## 2017-08-30 PROCEDURE — 74011250637 HC RX REV CODE- 250/637: Performed by: INTERNAL MEDICINE

## 2017-08-30 PROCEDURE — 85025 COMPLETE CBC W/AUTO DIFF WBC: CPT | Performed by: STUDENT IN AN ORGANIZED HEALTH CARE EDUCATION/TRAINING PROGRAM

## 2017-08-30 PROCEDURE — 36415 COLL VENOUS BLD VENIPUNCTURE: CPT | Performed by: STUDENT IN AN ORGANIZED HEALTH CARE EDUCATION/TRAINING PROGRAM

## 2017-08-30 PROCEDURE — 97165 OT EVAL LOW COMPLEX 30 MIN: CPT | Performed by: OCCUPATIONAL THERAPIST

## 2017-08-30 PROCEDURE — 74011250637 HC RX REV CODE- 250/637: Performed by: NURSE PRACTITIONER

## 2017-08-30 PROCEDURE — G8987 SELF CARE CURRENT STATUS: HCPCS | Performed by: OCCUPATIONAL THERAPIST

## 2017-08-30 PROCEDURE — 80053 COMPREHEN METABOLIC PANEL: CPT | Performed by: STUDENT IN AN ORGANIZED HEALTH CARE EDUCATION/TRAINING PROGRAM

## 2017-08-30 PROCEDURE — 83735 ASSAY OF MAGNESIUM: CPT | Performed by: STUDENT IN AN ORGANIZED HEALTH CARE EDUCATION/TRAINING PROGRAM

## 2017-08-30 PROCEDURE — 65660000000 HC RM CCU STEPDOWN

## 2017-08-30 PROCEDURE — G8989 SELF CARE D/C STATUS: HCPCS | Performed by: OCCUPATIONAL THERAPIST

## 2017-08-30 PROCEDURE — G8988 SELF CARE GOAL STATUS: HCPCS | Performed by: OCCUPATIONAL THERAPIST

## 2017-08-30 RX ADMIN — DIAZEPAM 20 MG: 5 TABLET ORAL at 22:27

## 2017-08-30 RX ADMIN — FOLIC ACID 1 MG: 1 TABLET ORAL at 08:00

## 2017-08-30 RX ADMIN — DIAZEPAM 10 MG: 5 TABLET ORAL at 11:56

## 2017-08-30 RX ADMIN — DIAZEPAM 20 MG: 5 TABLET ORAL at 21:24

## 2017-08-30 RX ADMIN — DIAZEPAM 20 MG: 5 TABLET ORAL at 19:34

## 2017-08-30 RX ADMIN — THERA TABS 1 TABLET: TAB at 08:00

## 2017-08-30 RX ADMIN — DIAZEPAM 10 MG: 5 TABLET ORAL at 14:37

## 2017-08-30 RX ADMIN — HYDROCHLOROTHIAZIDE 12.5 MG: 25 TABLET ORAL at 08:00

## 2017-08-30 RX ADMIN — DIAZEPAM 20 MG: 5 TABLET ORAL at 02:26

## 2017-08-30 RX ADMIN — CYANOCOBALAMIN TAB 500 MCG 1000 MCG: 500 TAB at 08:00

## 2017-08-30 RX ADMIN — DIAZEPAM 10 MG: 5 TABLET ORAL at 07:58

## 2017-08-30 RX ADMIN — Medication 10 ML: at 15:23

## 2017-08-30 RX ADMIN — Medication 100 MG: at 08:00

## 2017-08-30 RX ADMIN — Medication 10 ML: at 08:39

## 2017-08-30 RX ADMIN — DIAZEPAM 10 MG: 5 TABLET ORAL at 17:53

## 2017-08-30 RX ADMIN — DIAZEPAM 10 MG: 5 TABLET ORAL at 16:34

## 2017-08-30 RX ADMIN — ATENOLOL 50 MG: 50 TABLET ORAL at 08:00

## 2017-08-30 RX ADMIN — ASPIRIN 81 MG 81 MG: 81 TABLET ORAL at 08:00

## 2017-08-30 NOTE — PROGRESS NOTES
Spiritual Care Partner Volunteer visited patient on 8/30/17. Documented by:     Renald Cranker. M.S.   Spiritual Care Department  If needs arise please call John-ADONIS ([phone])

## 2017-08-30 NOTE — PROGRESS NOTES
7:30 PM Bedside report received from [employer], RN.    8:30 PM Paged hospitalist d/t patient's /109. Awaiting return call. 9:00 PM Spoke with Dr. Jigna Encarnacion. Orders received for clonidine 0.1 mg Q8 PRN for SBP > 155 or DBP > 105. See MAR. Will f/u and continue to monitor. 2:15 AM Patient woke up disoriented and confused about where he is and states he does not remember coming into the hospital. Patient noted to be diaphoretic, tremulous, and restless. Patient pulled out his PIV, tip intact. VSS. CIWA = 14. PRN valium 20 mg PO given, as ordered. See MAR. New #22 placed in right [facility]. Labs obtained. Bed alarm remains active and telesitter placed at bedside. Will follow up and continue to monitor closely.

## 2017-08-30 NOTE — PROGRESS NOTES
Hospitalist Progress Note   Patient Name  Yamileth Méndez   Admit date:  8/29/2017   Medical Record Number  [id number]    Age  58 y.o. Date of Birth 1955   PCP Jewell Smith MD    Room Number  2211/01 [facility]     Admission Diagnoses:  Alcohol withdrawal seizure [facility])     Assessment/ Plan:     Alcohol withdrawal seizure POA  Chronic Heavy Alcoholism  Fatty Liver/elevated LFTs POA   -agitated, disoriented last night, requiring Valium, today anxious and aggressive about going home  -pt had been binging for 2.5 weeks, improved today on CIWA sedation,but still in danger zone  CT head neg  TBili= 1.8, will repeat tomorrow     Admit to telemetry bed  Load with PO Valium x 3 doses then PO Ativan prn as per the CIWA scoring protocol  IV ativan prn seizures only  Goody bag x 1 in ER, cont PO FA, B12, Thiamine  Seizure precautions  CM consult for - OP alcohol rehab options on discharge   Cessation counseling given in ER- pt seems to be motivated to quit this time     Accelerated HTN  H/o CVA     Cont atenolol, HCTZ        Code Status: Full  Surrogate Decision Maker: wife Lynette Vora # [phone]     DVT Prophylaxis: Sq lovenox  GI Prophylaxis: IV pepcid     Baseline: Pt is independent at home with wife    UGARTE:    Principal Problem:    Alcohol withdrawal seizure ([facility] (8/29/2017)    Active Problems:    Chronic alcoholism ([facility] (7/9/2017)      Hepatic steatosis (7/9/2017)      Alcoholism /alcohol abuse ([facility] [address] (8/29/2017)      Accelerated hypertension (8/29/2017)      Body mass index is 25.13 kg/(m^2). Functional Status  good   CODE STATUS  Full   Surrogate decision maker: Pt is competent   Prophylaxis  None needed, pt now ambulant   Discharge Plan: Home w/Family,       Payor: BLUE CROSS / Plan: Mita Cruz [id number] PPO / Product Type: PPO /    Query No queries noted (chunky button not showing )        Subjective:   \"What am I still doing here? \"      Objective:     Physical Exam:    Gen: Angry , agitated, tremulous  HEENT:  Pink conjunctivae, PERRL, hearing intact to voice, moist mucous membranes  Neck:  Supple, without masses, thyroid non-tender  Resp:  No accessory muscle use, clear breath sounds without wheezes rales or rhonchi  Card:  No murmurs, normal S1, S2 without thrills, bruits or peripheral edema  Abd:  Soft, non-tender, non-distended, normoactive bowel sounds are present, no palpable organomegaly  Lymph:  No cervical adenopathy  Musc:  No cyanosis or clubbing  Skin:  No rashes or ulcers, skin turgor is good  Neuro:  Cranial nerves 3-12 are grossly intact,  strength is 5/5 bilaterally, dorsi / plantarflexion strength is 5/5 bilaterally, follows commands appropriately  Psych:  Alert with good insight. Oriented to person, place, and time            Intake/Output Summary (Last 24 hours) at 08/30/17 0905  Last data filed at 08/30/17 0300   Gross per 24 hour   Intake              720 ml   Output                0 ml   Net              720 ml    Weight change: 0 kg (0 lb)     Wt Readings from Last 10 Encounters:   08/29/17 77.2 kg (170 lb 3.1 oz)   07/15/17 83.1 kg (183 lb 3.2 oz)   02/01/17 78.8 kg (173 lb 11.6 oz)   01/24/17 79.2 kg (174 lb 9.7 oz)   11/03/16 73.9 kg (163 lb)   09/23/16 81.3 kg (179 lb 3.7 oz)   07/11/16 83.4 kg (183 lb 13.8 oz)   12/08/15 71.4 kg (157 lb 6.5 oz)   07/30/14 79.7 kg (175 lb 11.3 oz)   12/20/13 78 kg (172 lb)        Relevant informations: Other medical conditions listed in this following active hospital problem list section; all of these and other pertinent data were taken into consideration when treatment plan is developed and customized to this patient's unique overall circumstances and needs.    Patient Active Problem List    Diagnosis Date Noted    Alcoholism /alcohol abuse ([facility] 08/29/2017    Accelerated hypertension 08/29/2017    Alcohol withdrawal seizure ([facility] 08/29/2017    [facility] 07/09/2017    ARF (acute renal failure) ([address] 07/09/2017    HTN (hypertension) 07/09/2017     Class: Chronic    Hepatic steatosis 07/09/2017     Class: Chronic    History of CVA (cerebrovascular accident) 07/09/2017     Class: Present on Admission    UTI (urinary tract infection) 07/09/2017     Class: Present on Admission          Data Review:   Recent Days:  Recent Labs      08/30/17   0251  08/29/17 0218   WBC  2.8*  4.1   HGB  12.4  12.7   HCT  38.7  37.7   PLT  205  241     Recent Labs      08/30/17 0251 08/29/17 0218   NA  133*  134*   K  3.5  3.6   CL  99  98   CO2  28  29   GLU  109*  139*   BUN  6  4*   CREA  0.79  0.83   CA  9.3  9.0   MG  2.0  1.7   ALB  3.6  3.9   TBILI  1.4*  1.8*   SGOT  38*  41*   ALT  33  35     Lab Results   Component Value Date/Time    TSH 2.64 12/04/2015 01:17 PM     ______________________________________________________________________________________________________    Medications reviewed     Current Facility-Administered Medications   Medication Dose Route Frequency    aspirin chewable tablet 81 mg  81 mg Oral DAILY    atenolol (TENORMIN) tablet 50 mg  50 mg Oral DAILY    folic acid (FOLVITE) tablet 1 mg  1 mg Oral DAILY    hydroCHLOROthiazide (HYDRODIURIL) tablet 12.5 mg  12.5 mg Oral DAILY    thiamine (B-1) tablet 100 mg  100 mg Oral DAILY    sodium chloride (NS) flush 5-10 mL  5-10 mL IntraVENous Q8H    sodium chloride (NS) flush 5-10 mL  5-10 mL IntraVENous PRN    ondansetron (ZOFRAN) injection 4 mg  4 mg IntraVENous Q4H PRN    diazePAM (VALIUM) tablet 10 mg  10 mg Oral Q1H PRN    diazePAM (VALIUM) tablet 20 mg  20 mg Oral Q1H PRN    LORazepam (ATIVAN) injection 1 mg  1 mg IntraVENous Q5MIN PRN    famotidine (PF) (PEPCID) 20 mg in sodium chloride 0.9 % 10 mL injection  20 mg IntraVENous Q12H    cyanocobalamin (VITAMIN B12) tablet 1,000 mcg  1,000 mcg Oral DAILY    therapeutic multivitamin (THERAGRAN) tablet 1 Tab  1 Tab Oral DAILY    cloNIDine HCl (CATAPRES) tablet 0.1 mg  0.1 mg Oral Q8H PRN       ______________________________________________________________________________________________________  Care Plan discussed with: Patient/Family  ____________________________________________________________________________________________________    High complexity decision making was performed for this patient who is at high risk for decompensation with multiple organ involvement. Today total floor/unit time was 25 minutes while caring for this patient and greater than 50% of that time was spent with patient (and/or family) discussing patients clinical issues.   ______________________________________________________________________________________________________  [facility]                      8/30/2017

## 2017-08-30 NOTE — PROGRESS NOTES
Cardiopulmonary Care Interdisciplinary rounds were held today to discuss patient plan of care and outcomes. The following members were present: PT, NP/Physician, Pharmacy, Nursing, Nutritionist and Case Management. Plan of Care: Continue current treatment plan;  On CIWA

## 2017-08-30 NOTE — PROGRESS NOTES
Bedside shift change report given to Mayda Wagner (oncoming nurse) by Jw Becerra, RN (offgoing nurse). Report included the following information SBAR.       2105: Paged on-call MD. Pt pulled out IV and does not want a new one placed. 2108: On-call MD. Dr Nadine Tang returned page. Stated he was ok with pt not having IV access at this time. 18: Unable to obtain labwork after multiple failed attemps. Pt refused anymore sticks.

## 2017-08-30 NOTE — PROGRESS NOTES
Occupational Therapy EVALUATION/discharge  Patient: lAan Ahn (56 y.o. male)  Date: 8/30/2017  Primary Diagnosis: Alcohol withdrawal seizure ([facility] [address]  Alcoholism /alcohol abuse ([facility] [address]  Accelerated hypertension        Precautions: Fall       ASSESSMENT:   Based on the objective data described below, the patient presents at his independent baseline for ADLs and functional mobility. No LOB during standing ADLs and functional mobility, and patient demonstrating good safety awareness. Further skilled acute occupational therapy is not indicated at this time. Discharge Recommendations: none  Further Equipment Recommendations for Discharge: none          OBJECTIVE DATA SUMMARY:   HISTORY:   Past Medical History:   Diagnosis Date    Alcoholism ([facility] [address]     Hypertension     Ill-defined condition     Hernia     Seizures ([facility]     Withdrawal      Past Surgical History:   Procedure Laterality Date    HX HERNIA REPAIR         Prior Level of Function/Home Situation: Independent and working out. Expanded or extensive additional review of patient history:     Home Situation  Home Environment: Private residence  # Steps to Enter: 20  One/Two Story Residence: Two story  Living Alone: No  Support Systems: Spouse/Significant Other/Partner  Patient Expects to be Discharged to[de-identified] Private residence  Current DME Used/Available at Home: None  [x]  Right hand dominant   []  Left hand dominant    EXAMINATION OF PERFORMANCE DEFICITS:  Cognitive/Behavioral Status:  Neurologic State: Alert  Orientation Level: Oriented X4  Cognition: Appropriate decision making; Appropriate for age attention/concentration; Appropriate safety awareness; Follows commands        Safety/Judgement: Awareness of environment;Good awareness of safety precautions    Hearing: Auditory  Auditory Impairment: None    Vision/Perceptual:    Acuity: Within Defined Limits         Range of Motion:  AROM: Within functional limits                       Strength:  Strength:  Within functional limits              Coordination:  Coordination: Within functional limits          Tone & Sensation:  Tone: Normal  Sensation: Intact                    Balance:  Sitting: Intact  Standing: Intact    Functional Mobility and Transfers for ADLs:  Bed Mobility:  Scooting: Independent    Transfers:  Sit to Stand: Independent  Stand to Sit: Independent  Bed to Chair: Independent  Toilet Transfer : Independent    ADL Assessment:  Feeding: Independent    Oral Facial Hygiene/Grooming: Independent    Bathing: Independent    Upper Body Dressing: Independent    Lower Body Dressing: Independent    Toileting: Independent                Functional Measure:  Barthel Index:    Bathin  Bladder: 10  Bowels: 10  Groomin  Dressing: 10  Feeding: 10  Mobility: 15  Stairs: 10  Toilet Use: 10  Transfer (Bed to Chair and Back): 15  Total: 100       Barthel and G-code impairment scale:  Percentage of impairment CH  0% CI  1-19% CJ  20-39% CK  40-59% CL  60-79% CM  80-99% CN  100%   Barthel Score 0-100 100 99-80 79-60 59-40 20-39 1-19   0   Barthel Score 0-20 20 17-19 13-16 9-12 5-8 1-4 0      The Barthel ADL Index: Guidelines  1. The index should be used as a record of what a patient does, not as a record of what a patient could do. 2. The main aim is to establish degree of independence from any help, physical or verbal, however minor and for whatever reason. 3. The need for supervision renders the patient not independent. 4. A patient's performance should be established using the best available evidence. Asking the patient, friends/relatives and nurses are the usual sources, but direct observation and common sense are also important. However direct testing is not needed. 5. Usually the patient's performance over the preceding 24-48 hours is important, but occasionally longer periods will be relevant. 6. Middle categories imply that the patient supplies over 50 per cent of the effort.   7. Use of aids to be independent is allowed. Torito Payton., Barthel, D.W. (5148). Functional evaluation: the Barthel Index. [address] (14)2. Gaile Rubinstein magalys JESSI Dominguez, Emmanuel Marinelli., Jaz Parra., Agustin Heath, [address] (1999). Measuring the change indisability after inpatient rehabilitation; comparison of the responsiveness of the Barthel Index and Functional [place] Measure. Journal of Neurology, Neurosurgery, and Psychiatry, 66(4), 273-413. CHOLO Sanchez, KAYCEE Irving, & Brianna Salazar M.A. (2004.) Assessment of post-stroke quality of life in cost-effectiveness studies: The usefulness of the Barthel Index and the EuroQoL-5D. Quality of Life Research, 13, 219-00       G codes: In compliance with CMSs Claims Based Outcome Reporting, the following G-code set was chosen for this patient based on their primary functional limitation being treated: The outcome measure chosen to determine the severity of the functional limitation was the Barthel Index with a score of 100/100 which was correlated with the impairment scale. ? Self Care:     - CURRENT STATUS: CH - 0% impaired, limited or restricted    - GOAL STATUS: CH - 0% impaired, limited or restricted    - D/C STATUS:  CH - 0% impaired, limited or restricted     Pain:Pain Scale 1: Numeric (0 - 10)  Pain Intensity 1: 0     . After treatment:   [x]  Patient left in no apparent distress sitting up in chair  []  Patient left in no apparent distress in bed  [x]  Call bell left within reach  [x]  Nursing notified  []  Caregiver present  [x]  Bed alarm activated    COMMUNICATION/EDUCATION:   Communication/Collaboration:  [x]      Home safety education was provided and the patient/caregiver indicated understanding. [x]      Patient/family have participated as able and agree with findings and recommendations. []      Patient is unable to participate in plan of care at this time.   Findings and recommendations were discussed with: Rangel Sahu, OTR/L  Time Calculation: 9 mins

## 2017-08-31 LAB
ANION GAP SERPL CALC-SCNC: 6 MMOL/L (ref 5–15)
BASOPHILS # BLD: 0 K/UL (ref 0–0.1)
BASOPHILS NFR BLD: 1 % (ref 0–1)
BUN SERPL-MCNC: 9 MG/DL (ref 6–20)
BUN/CREAT SERPL: 12 (ref 12–20)
CALCIUM SERPL-MCNC: 9.2 MG/DL (ref 8.5–10.1)
CHLORIDE SERPL-SCNC: 102 MMOL/L (ref 97–108)
CO2 SERPL-SCNC: 31 MMOL/L (ref 21–32)
CREAT SERPL-MCNC: 0.76 MG/DL (ref 0.7–1.3)
DIFFERENTIAL METHOD BLD: ABNORMAL
EOSINOPHIL # BLD: 0.1 K/UL (ref 0–0.4)
EOSINOPHIL NFR BLD: 3 % (ref 0–7)
ERYTHROCYTE [DISTWIDTH] IN BLOOD BY AUTOMATED COUNT: 11.9 % (ref 11.5–14.5)
GLUCOSE BLD STRIP.AUTO-MCNC: 170 MG/DL (ref 65–100)
GLUCOSE SERPL-MCNC: 47 MG/DL (ref 65–100)
HCT VFR BLD AUTO: 36.5 % (ref 36.6–50.3)
HGB BLD-MCNC: 12.1 G/DL (ref 12.1–17)
LYMPHOCYTES # BLD: 0.8 K/UL (ref 0.8–3.5)
LYMPHOCYTES NFR BLD: 27 % (ref 12–49)
MCH RBC QN AUTO: 34.5 PG (ref 26–34)
MCHC RBC AUTO-ENTMCNC: 33.2 G/DL (ref 30–36.5)
MCV RBC AUTO: 104 FL (ref 80–99)
MONOCYTES # BLD: 0.4 K/UL (ref 0–1)
MONOCYTES NFR BLD: 14 % (ref 5–13)
NEUTS SEG # BLD: 1.5 K/UL (ref 1.8–8)
NEUTS SEG NFR BLD: 55 % (ref 32–75)
PLATELET # BLD AUTO: 195 K/UL (ref 150–400)
POTASSIUM SERPL-SCNC: 3.1 MMOL/L (ref 3.5–5.1)
RBC # BLD AUTO: 3.51 M/UL (ref 4.1–5.7)
RBC MORPH BLD: ABNORMAL
SERVICE CMNT-IMP: ABNORMAL
SODIUM SERPL-SCNC: 139 MMOL/L (ref 136–145)
WBC # BLD AUTO: 2.8 K/UL (ref 4.1–11.1)

## 2017-08-31 PROCEDURE — 74011250637 HC RX REV CODE- 250/637: Performed by: INTERNAL MEDICINE

## 2017-08-31 PROCEDURE — 80048 BASIC METABOLIC PNL TOTAL CA: CPT | Performed by: STUDENT IN AN ORGANIZED HEALTH CARE EDUCATION/TRAINING PROGRAM

## 2017-08-31 PROCEDURE — 74011000258 HC RX REV CODE- 258: Performed by: INTERNAL MEDICINE

## 2017-08-31 PROCEDURE — 36415 COLL VENOUS BLD VENIPUNCTURE: CPT | Performed by: STUDENT IN AN ORGANIZED HEALTH CARE EDUCATION/TRAINING PROGRAM

## 2017-08-31 PROCEDURE — 74011250637 HC RX REV CODE- 250/637: Performed by: NURSE PRACTITIONER

## 2017-08-31 PROCEDURE — 82962 GLUCOSE BLOOD TEST: CPT

## 2017-08-31 PROCEDURE — 74011250636 HC RX REV CODE- 250/636: Performed by: INTERNAL MEDICINE

## 2017-08-31 PROCEDURE — 65610000006 HC RM INTENSIVE CARE

## 2017-08-31 PROCEDURE — 85025 COMPLETE CBC W/AUTO DIFF WBC: CPT | Performed by: STUDENT IN AN ORGANIZED HEALTH CARE EDUCATION/TRAINING PROGRAM

## 2017-08-31 RX ORDER — THIAMINE HYDROCHLORIDE 100 MG/ML
500 INJECTION, SOLUTION INTRAMUSCULAR; INTRAVENOUS DAILY
Status: DISCONTINUED | OUTPATIENT
Start: 2017-08-31 | End: 2017-08-31 | Stop reason: SDUPTHER

## 2017-08-31 RX ORDER — DIAZEPAM 10 MG/2ML
10 INJECTION INTRAMUSCULAR
Status: DISCONTINUED | OUTPATIENT
Start: 2017-08-31 | End: 2017-09-02 | Stop reason: HOSPADM

## 2017-08-31 RX ORDER — DIAZEPAM 10 MG/2ML
20 INJECTION INTRAMUSCULAR
Status: DISCONTINUED | OUTPATIENT
Start: 2017-08-31 | End: 2017-09-02 | Stop reason: HOSPADM

## 2017-08-31 RX ORDER — MUPIROCIN 20 MG/G
OINTMENT TOPICAL EVERY 12 HOURS
Status: DISCONTINUED | OUTPATIENT
Start: 2017-09-01 | End: 2017-09-02 | Stop reason: HOSPADM

## 2017-08-31 RX ORDER — POTASSIUM CHLORIDE 750 MG/1
40 TABLET, FILM COATED, EXTENDED RELEASE ORAL
Status: COMPLETED | OUTPATIENT
Start: 2017-08-31 | End: 2017-08-31

## 2017-08-31 RX ADMIN — THERA TABS 1 TABLET: TAB at 09:26

## 2017-08-31 RX ADMIN — Medication 10 ML: at 21:25

## 2017-08-31 RX ADMIN — Medication 100 MG: at 09:26

## 2017-08-31 RX ADMIN — ASPIRIN 81 MG 81 MG: 81 TABLET ORAL at 09:25

## 2017-08-31 RX ADMIN — FOLIC ACID 1 MG: 1 TABLET ORAL at 09:25

## 2017-08-31 RX ADMIN — CYANOCOBALAMIN TAB 500 MCG 1000 MCG: 500 TAB at 09:25

## 2017-08-31 RX ADMIN — Medication 10 ML: at 16:17

## 2017-08-31 RX ADMIN — POTASSIUM CHLORIDE 40 MEQ: 750 TABLET, FILM COATED, EXTENDED RELEASE ORAL at 16:11

## 2017-08-31 RX ADMIN — LORAZEPAM 1 MG: 2 INJECTION INTRAMUSCULAR; INTRAVENOUS at 23:59

## 2017-08-31 RX ADMIN — THIAMINE HYDROCHLORIDE 500 MG: 100 INJECTION, SOLUTION INTRAMUSCULAR; INTRAVENOUS at 20:51

## 2017-08-31 RX ADMIN — DIAZEPAM 20 MG: 5 TABLET ORAL at 18:27

## 2017-08-31 RX ADMIN — DIAZEPAM 10 MG: 5 INJECTION, SOLUTION INTRAMUSCULAR; INTRAVENOUS at 23:58

## 2017-08-31 RX ADMIN — DIAZEPAM 10 MG: 5 TABLET ORAL at 04:53

## 2017-08-31 RX ADMIN — ATENOLOL 50 MG: 50 TABLET ORAL at 09:26

## 2017-08-31 RX ADMIN — HYDROCHLOROTHIAZIDE 12.5 MG: 25 TABLET ORAL at 09:26

## 2017-08-31 NOTE — PROGRESS NOTES
Bedside shift change report given to Felipe Greenfield (oncoming nurse) by Macario Carney RN (offgoing nurse). Report included the following information SBAR.    1900: Pt is agitated and aggressive. CIWA scale is 20. Day shift nurse medicated pt with PRN valium. 1910:Paged Dr. Judi Madden. 1920: Dr. Judi Madden on floor to see pt. New orders received for pt to be transferred to CCU.        2000: TRANSFER - OUT REPORT:    Verbal report given to Judah Wright RN(name) on Elina Freedman  being transferred to CCU 2542(unit) for change in patient condition(Increased agitation)       Report consisted of patients Situation, Background, Assessment and   Recommendations(SBAR). Information from the following report(s) SBAR, Kardex, [facility] and Cardiac Rhythm NSR was reviewed with the receiving nurse. Lines:   Peripheral IV 08/31/17 Left Forearm (Active)   Site Assessment Clean, dry, & intact 8/31/2017  7:30 PM   Phlebitis Assessment 0 8/31/2017  7:30 PM   Infiltration Assessment 0 8/31/2017  7:30 PM   Dressing Status Clean, dry, & intact 8/31/2017  7:30 PM   Dressing Type Transparent;Tape 8/31/2017  7:30 PM   Hub Color/Line Status Blue;Capped 8/31/2017  7:30 PM   Action Taken Other (comment) 8/31/2017 11:19 AM        Opportunity for questions and clarification was provided.       Patient transported with:   Registered Nurse  Tech

## 2017-08-31 NOTE — PROGRESS NOTES
Hospitalist Progress Note   Patient Name  Yamileth Méndez   Admit date:  8/29/2017   Medical Record Number  [id number]    Age  58 y.o. Date of Birth 1955   PCP Pauline Alfonso MD    Room Number  2211/01 [facility]     Admission Diagnoses:  Alcohol withdrawal seizure [facility])     Assessment/ Plan:     Alcohol withdrawal seizure POA  Chronic Heavy Alcoholism  Fatty Liver/elevated LFTs POA   -pulled out iv and refused lab draws overnight  -agitated, disoriented 8/29 a night, requiring Valium, today anxious and aggressive about going home  -pt had been binging for 2.5 weeks  CT head neg  TBili1.8-> 1.4     Admit to telemetry bed  Load with PO Valium x 3 doses then PO Ativan prn as per the CIWA scoring protocol  IV ativan prn seizures only  Goody bag x 1 in ER, cont PO FA, B12, Thiamine  Seizure precautions  CM consult for - OP alcohol rehab options on discharge   Cessation counseling given in ER- pt seems to be motivated to quit this time     Accelerated HTN  H/o CVA     Cont atenolol, HCTZ        Code Status: Full  Surrogate Decision Maker: wife Lynette Vora # [phone]     DVT Prophylaxis: Sq lovenox  GI Prophylaxis: IV pepcid     Baseline: Pt is independent at home with wife    UGARTE:    Principal Problem:    Alcohol withdrawal seizure ([facility] [address] (8/29/2017)    Active Problems:    Chronic alcoholism ([facility] [address] (7/9/2017)      Hepatic steatosis (7/9/2017)      Alcoholism /alcohol abuse ([facility] [address] (8/29/2017)      Accelerated hypertension (8/29/2017)      Body mass index is 25.13 kg/(m^2).     Functional Status  good   CODE STATUS  Full   Surrogate decision maker: Pt is competent   Prophylaxis  None needed, pt now ambulant   Discharge Plan: Home w/Family,       Payor: BLUE CROSS / Plan: Mita Cruz [id number] PPO / Product Type: PPO /    Query No queries noted (chunky button not showing )        Subjective:   Disappointed at not being able to go home today  Overnight, was delirious, confabulated and attempted to abscond, removed his iv and refused labs  Got valium 20mg this am for CIWA of 13      Objective:     Physical Exam:    Gen: Upset, in bed  HEENT:  Pink conjunctivae, PERRL, hearing intact to voice, moist mucous membranes  Neck:  Supple, without masses, thyroid non-tender  Resp:  No accessory muscle use, clear breath sounds without wheezes rales or rhonchi  Card:  No murmurs, normal S1, S2 without thrills, bruits or peripheral edema  Abd:  Soft, non-tender, non-distended, normoactive bowel sounds are present, no palpable organomegaly  Lymph:  No cervical adenopathy  Musc:  No cyanosis or clubbing  Skin:  No rashes or ulcers, skin turgor is good  Neuro:  Cranial nerves 3-12 are grossly intact,  strength is 5/5 bilaterally, dorsi / plantarflexion strength is 5/5 bilaterally, follows commands appropriately  Psych:  Alert with good insight. Oriented to person, place, and time          No intake or output data in the 24 hours ending 08/31/17 0836 Weight change: Wt Readings from Last 10 Encounters:   08/29/17 77.2 kg (170 lb 3.1 oz)   07/15/17 83.1 kg (183 lb 3.2 oz)   02/01/17 78.8 kg (173 lb 11.6 oz)   01/24/17 79.2 kg (174 lb 9.7 oz)   11/03/16 73.9 kg (163 lb)   09/23/16 81.3 kg (179 lb 3.7 oz)   07/11/16 83.4 kg (183 lb 13.8 oz)   12/08/15 71.4 kg (157 lb 6.5 oz)   07/30/14 79.7 kg (175 lb 11.3 oz)   12/20/13 78 kg (172 lb)        Relevant informations: Other medical conditions listed in this following active hospital problem list section; all of these and other pertinent data were taken into consideration when treatment plan is developed and customized to this patient's unique overall circumstances and needs.    Patient Active Problem List    Diagnosis Date Noted    Alcoholism /alcohol abuse ([facility] [address] 08/29/2017    Accelerated hypertension 08/29/2017    [facility] 08/[facility]/2017    [facility] 07/09/2017    [facility]) [facility] 07/09/2017    HTN (hypertension) 07/09/2017     Class: Chronic    Hepatic steatosis 07/09/2017     Class: Chronic    History of CVA (cerebrovascular accident) 07/09/2017     Class: Present on Admission    UTI (urinary tract infection) 07/09/2017     Class: Present on Admission          Data Review:   Recent Days:  Recent Labs      08/30/17   0251  08/29/17 0218   WBC  2.8*  4.1   HGB  12.4  12.7   HCT  38.7  37.7   PLT  205  241     Recent Labs      08/30/17 0251 08/29/17 0218   NA  133*  134*   K  3.5  3.6   CL  99  98   CO2  28  29   GLU  109*  139*   BUN  6  4*   CREA  0.79  0.83   CA  9.3  9.0   MG  2.0  1.7   ALB  3.6  3.9   TBILI  1.4*  1.8*   SGOT  38*  41*   ALT  33  35     Lab Results   Component Value Date/Time    TSH 2.64 12/04/2015 01:17 PM     ______________________________________________________________________________________________________    Medications reviewed     Current Facility-Administered Medications   Medication Dose Route Frequency    aspirin chewable tablet 81 mg  81 mg Oral DAILY    atenolol (TENORMIN) tablet 50 mg  50 mg Oral DAILY    folic acid (FOLVITE) tablet 1 mg  1 mg Oral DAILY    hydroCHLOROthiazide (HYDRODIURIL) tablet 12.5 mg  12.5 mg Oral DAILY    thiamine (B-1) tablet 100 mg  100 mg Oral DAILY    sodium chloride (NS) flush 5-10 mL  5-10 mL IntraVENous Q8H    sodium chloride (NS) flush 5-10 mL  5-10 mL IntraVENous PRN    ondansetron (ZOFRAN) injection 4 mg  4 mg IntraVENous Q4H PRN    diazePAM (VALIUM) tablet 10 mg  10 mg Oral Q1H PRN    diazePAM (VALIUM) tablet 20 mg  20 mg Oral Q1H PRN    LORazepam (ATIVAN) injection 1 mg  1 mg IntraVENous Q5MIN PRN    cyanocobalamin (VITAMIN B12) tablet 1,000 mcg  1,000 mcg Oral DAILY    therapeutic multivitamin (THERAGRAN) tablet 1 Tab  1 Tab Oral DAILY    cloNIDine HCl (CATAPRES) tablet 0.1 mg  0.1 mg Oral Q8H PRN       ______________________________________________________________________________________________________  Care Plan discussed with: Patient/Family  ____________________________________________________________________________________________________    High complexity decision making was performed for this patient who is at high risk for decompensation with multiple organ involvement. Today total floor/unit time was 25 minutes while caring for this patient and greater than 50% of that time was spent with patient (and/or family) discussing patients clinical issues.   ______________________________________________________________________________________________________  Solo Irizarry MD                      8/31/2017

## 2017-08-31 NOTE — PROGRESS NOTES
Called by RN because of CIWA score of 20, Came to evaluated the patient. Pt is confuse and thinks that [place]. See my assessment and plan below for details    Visit Vitals    /70 (BP 1 Location: Right arm, BP Patient Position: Sitting)    Pulse 73    Temp 97.3 °F (36.3 °C)    Resp 18    Ht 5' 9\" (1.753 m)    Wt 77.2 kg (170 lb 3.1 oz)    SpO2 99%    BMI 25.13 kg/m2       PHYSICAL EXAM:  General: WD, WN. Alert,  Not in acute distress  EENT:  Anicteric sclerae,  Resp:  Clear lungs, no wheezing or rales. No accessory muscle use  CV:  Regular  rhythm,  No edema  GI:  Soft, Non distended, Non tender.  +Bowel sounds,   Neurologic:  Alert with poor insight. Poor Finger nose test / coordination  Psych:   Poor insight.   Skin:  no rashes or ulcers. No jaundice. Assessment/Plan:  Delirium Tremens  Transfer to ICU  Switch CIWA with Valium to IV form from PO  Start Precedex Drip  Switch PO thiamine to High dose IV considering risk for Wernicke-Korsakoff encephalopathy    I have spent critical care time involved in lab review, consultations with specialist, family decision-making, and documentation. During this entire length of time I was immediately available to the patient. The reason for providing this level of medical care for this critically ill patient was due to a critical illness that impaired one or more vital organ systems such that there was a high probability of imminent or life threatening deterioration in the patients condition. This care involved high complexity decision making to assess, manipulate, and support vital system functions, to treat this degreee vital organ system failure and to prevent further life threatening deterioration of the patients condition.     Total critical care time spent: 35 mins    ________________________________________________________________________  Amol Barker MD

## 2017-09-01 LAB
ANION GAP SERPL CALC-SCNC: 4 MMOL/L (ref 5–15)
BASOPHILS # BLD: 0 K/UL (ref 0–0.1)
BASOPHILS NFR BLD: 1 % (ref 0–1)
BUN SERPL-MCNC: 12 MG/DL (ref 6–20)
BUN/CREAT SERPL: 15 (ref 12–20)
CALCIUM SERPL-MCNC: 9.2 MG/DL (ref 8.5–10.1)
CHLORIDE SERPL-SCNC: 104 MMOL/L (ref 97–108)
CO2 SERPL-SCNC: 31 MMOL/L (ref 21–32)
CREAT SERPL-MCNC: 0.8 MG/DL (ref 0.7–1.3)
EOSINOPHIL # BLD: 0.1 K/UL (ref 0–0.4)
EOSINOPHIL NFR BLD: 2 % (ref 0–7)
ERYTHROCYTE [DISTWIDTH] IN BLOOD BY AUTOMATED COUNT: 11.9 % (ref 11.5–14.5)
GLUCOSE SERPL-MCNC: 89 MG/DL (ref 65–100)
HCT VFR BLD AUTO: 38.6 % (ref 36.6–50.3)
HGB BLD-MCNC: 12.7 G/DL (ref 12.1–17)
LYMPHOCYTES # BLD: 1 K/UL (ref 0.8–3.5)
LYMPHOCYTES NFR BLD: 31 % (ref 12–49)
MAGNESIUM SERPL-MCNC: 1.9 MG/DL (ref 1.6–2.4)
MCH RBC QN AUTO: 33.9 PG (ref 26–34)
MCHC RBC AUTO-ENTMCNC: 32.9 G/DL (ref 30–36.5)
MCV RBC AUTO: 102.9 FL (ref 80–99)
MONOCYTES # BLD: 0.5 K/UL (ref 0–1)
MONOCYTES NFR BLD: 16 % (ref 5–13)
NEUTS SEG # BLD: 1.6 K/UL (ref 1.8–8)
NEUTS SEG NFR BLD: 50 % (ref 32–75)
PLATELET # BLD AUTO: 226 K/UL (ref 150–400)
POTASSIUM SERPL-SCNC: 3.8 MMOL/L (ref 3.5–5.1)
RBC # BLD AUTO: 3.75 M/UL (ref 4.1–5.7)
SODIUM SERPL-SCNC: 139 MMOL/L (ref 136–145)
WBC # BLD AUTO: 3.1 K/UL (ref 4.1–11.1)

## 2017-09-01 PROCEDURE — 74011250636 HC RX REV CODE- 250/636: Performed by: INTERNAL MEDICINE

## 2017-09-01 PROCEDURE — 80048 BASIC METABOLIC PNL TOTAL CA: CPT | Performed by: STUDENT IN AN ORGANIZED HEALTH CARE EDUCATION/TRAINING PROGRAM

## 2017-09-01 PROCEDURE — 74011250637 HC RX REV CODE- 250/637: Performed by: STUDENT IN AN ORGANIZED HEALTH CARE EDUCATION/TRAINING PROGRAM

## 2017-09-01 PROCEDURE — 85025 COMPLETE CBC W/AUTO DIFF WBC: CPT | Performed by: STUDENT IN AN ORGANIZED HEALTH CARE EDUCATION/TRAINING PROGRAM

## 2017-09-01 PROCEDURE — 65660000000 HC RM CCU STEPDOWN

## 2017-09-01 PROCEDURE — 74011250637 HC RX REV CODE- 250/637: Performed by: NURSE PRACTITIONER

## 2017-09-01 PROCEDURE — 74011250637 HC RX REV CODE- 250/637: Performed by: INTERNAL MEDICINE

## 2017-09-01 PROCEDURE — 36415 COLL VENOUS BLD VENIPUNCTURE: CPT | Performed by: STUDENT IN AN ORGANIZED HEALTH CARE EDUCATION/TRAINING PROGRAM

## 2017-09-01 PROCEDURE — 83735 ASSAY OF MAGNESIUM: CPT | Performed by: STUDENT IN AN ORGANIZED HEALTH CARE EDUCATION/TRAINING PROGRAM

## 2017-09-01 RX ORDER — CHLORDIAZEPOXIDE HYDROCHLORIDE 5 MG/1
10 CAPSULE, GELATIN COATED ORAL 3 TIMES DAILY
Status: DISCONTINUED | OUTPATIENT
Start: 2017-09-01 | End: 2017-09-02

## 2017-09-01 RX ORDER — LORAZEPAM 1 MG/1
1 TABLET ORAL EVERY 8 HOURS
Status: DISCONTINUED | OUTPATIENT
Start: 2017-09-01 | End: 2017-09-01

## 2017-09-01 RX ADMIN — ASPIRIN 81 MG 81 MG: 81 TABLET ORAL at 10:02

## 2017-09-01 RX ADMIN — Medication 10 ML: at 21:14

## 2017-09-01 RX ADMIN — THERA TABS 1 TABLET: TAB at 10:04

## 2017-09-01 RX ADMIN — DIAZEPAM 20 MG: 5 INJECTION, SOLUTION INTRAMUSCULAR; INTRAVENOUS at 03:09

## 2017-09-01 RX ADMIN — CYANOCOBALAMIN TAB 500 MCG 1000 MCG: 500 TAB at 10:02

## 2017-09-01 RX ADMIN — FOLIC ACID 1 MG: 1 TABLET ORAL at 10:11

## 2017-09-01 RX ADMIN — DIAZEPAM 10 MG: 5 INJECTION, SOLUTION INTRAMUSCULAR; INTRAVENOUS at 01:03

## 2017-09-01 RX ADMIN — DIAZEPAM 20 MG: 5 INJECTION, SOLUTION INTRAMUSCULAR; INTRAVENOUS at 06:16

## 2017-09-01 RX ADMIN — HYDROCHLOROTHIAZIDE 12.5 MG: 25 TABLET ORAL at 09:59

## 2017-09-01 RX ADMIN — MUPIROCIN: 20 OINTMENT TOPICAL at 10:04

## 2017-09-01 RX ADMIN — LORAZEPAM 1 MG: 2 INJECTION INTRAMUSCULAR; INTRAVENOUS at 03:50

## 2017-09-01 RX ADMIN — ATENOLOL 50 MG: 50 TABLET ORAL at 09:58

## 2017-09-01 RX ADMIN — Medication 10 ML: at 06:17

## 2017-09-01 RX ADMIN — LORAZEPAM 1 MG: 1 TABLET ORAL at 10:02

## 2017-09-01 RX ADMIN — CHLORDIAZEPOXIDE HYDROCHLORIDE 10 MG: 5 CAPSULE ORAL at 21:12

## 2017-09-01 NOTE — PROGRESS NOTES
Visited by Jose Swartz Partner on 9/1/17.     KAY Gomes, [facility], [address] [facility] [address]     Paging Service  [phone]

## 2017-09-01 NOTE — PROGRESS NOTES
TRANSFER - IN REPORT:    Verbal report received from [address] [place] [address] [place] on Anila Kwan  being received from [facility] for urgent transfer      Report consisted of patients Situation, Background, Assessment and   Recommendations(SBAR). Information from the following report(s) SBAR was reviewed with the receiving nurse. Opportunity for questions and clarification was provided. Assessment completed upon patients arrival to unit and care assumed.

## 2017-09-01 NOTE — PROGRESS NOTES
Nutrition Assessment:    RECOMMENDATIONS:   Continue Cardiac/2gNa diet    ASSESSMENT:   Chart reviewed, case discussed during CCU rounds. Pt moved to CCU 2' CIWA. He is less confused and combative now per RN. Diet advanced. Labs reviewed, WNL. BM noted on 8/30. Will monitor his appetite now that diet is advanced. Dietitians Intervention(s)/Plan(s): Continue diet, monitor PO intake  SUBJECTIVE/OBJECTIVE:     Diet Order: Cardiac, Other (comment) (2gNa diet)  % Eaten:  No data found. Pertinent Medications:Vitamin B12, folvite, MVI, thiamin. Chemistries:  Lab Results   Component Value Date/Time    Sodium 139 09/01/2017 08:04 AM    Potassium 3.8 09/01/2017 08:04 AM    Chloride 104 09/01/2017 08:04 AM    CO2 31 09/01/2017 08:04 AM    Anion gap 4 09/01/2017 08:04 AM    Glucose 89 09/01/2017 08:04 AM    BUN 12 09/01/2017 08:04 AM    Creatinine 0.80 09/01/2017 08:04 AM    BUN/Creatinine ratio 15 09/01/2017 08:04 AM    GFR est AA >60 09/01/2017 08:04 AM    GFR est non-AA >60 09/01/2017 08:04 AM    Calcium 9.2 09/01/2017 08:04 AM    Albumin 3.6 08/30/2017 02:51 AM      Anthropometrics: Height: 5' 9\" (175.3 cm) Weight: 77.2 kg (170 lb 3.1 oz)    IBW (%IBW):   ( ) UBW (%UBW):   (  %)    BMI: Body mass index is 25.13 kg/(m^2). This BMI is indicative of:  []Underweight   [x]Normal   []Overweight   [] Obesity   [] Extreme Obesity (BMI>40)  Estimated Nutrition Needs (Based on): 2028 Kcals/day (BMR 1560x1. 3(AF)) , 77.2 g (1.0 g/kg BW) Protein  Carbohydrate: At Least 130 g/day  Fluids: 2000 mL/day    Last BM: 8/30   []Active     []Hyperactive  []Hypoactive       [] Absent   BS  Skin:    [x] Intact   [] Incision  [] Breakdown   [] DTI   [] Tears/Excoriation/Abrasion  []Edema [] Other:    Wt Readings from Last 30 Encounters:   08/29/17 77.2 kg (170 lb 3.1 oz)   07/15/17 83.1 kg (183 lb 3.2 oz)   02/01/17 78.8 kg (173 lb 11.6 oz)   01/24/17 79.2 kg (174 lb 9.7 oz)   11/03/16 73.9 kg (163 lb)   09/23/16 81.3 kg (179 lb 3.7 oz)   07/11/16 83.4 kg (183 lb 13.8 oz)   12/08/15 71.4 kg (157 lb 6.5 oz)   07/30/14 79.7 kg (175 lb 11.3 oz)   12/20/13 78 kg (172 lb)   12/29/12 82.8 kg (182 lb 8.7 oz)   01/04/12 73.2 kg (161 lb 6 oz)   05/25/10 78 kg (172 lb)      NUTRITION DIAGNOSES:   Problem:  Inadequate energy intake      Etiology: related to poor appetite secondary to alcohol abuse     Signs/Symptoms: as evidenced by wt loss of 8 lb in last few weeks, pt reported decreased PO intake. Previous dx re: inadequate energy intake resolving, diet advanced. NUTRITION INTERVENTIONS:  Meals/Snacks: General/healthful diet                  GOAL:   Pt will consume >50% of meals in 3-5 days.     NUTRITION MONITORING AND EVALUATION   Previous Goal: Consume >50% of meals over next 3-5 days   Previous Goal Met: Yes   Previous Recommendations Implemented: Yes   Cultural, Yarsanism, or Ethnic Dietary Needs: None   LEARNING NEEDS (Diet, Food/Nutrient-Drug Interaction):    [x] None Identified   [] Identified and Education Provided/Documented   [] Identified and Pt declined/was not appropriate      [x] Interdisciplinary Care Plan Reviewed/Documented    [x] Participated in Discharge Planning: Cardiac diet   [x] Interdisciplinary Rounds     NUTRITION RISK:    [] High              [] Moderate           [x]  Low  []  Minimal/Uncompromised      Gualberto Cain [address], [address]   Pager [phone]  Weekend Pager [phone]

## 2017-09-01 NOTE — PROGRESS NOTES
[facility]  Pulmonary, Critical Care, and Sleep Medicine    Name: Madi Mehta MRN: [id number]   : 1955 Hospital: [facility]   Date: 2017        Critical Care Initial Patient Consult    IMPRESSION:   · Acute Alcohol withdrawal, Hx of CVA  · Acute alcohol withdrawal seizure  · Anemia  · Elevated LFTs  · Head Ct unremarkable. · Hypertension      RECOMMENDATIONS:   · Ativan scheduled and prn. · Not on precedex  · Goody bag  · Has a sitter at present     Subjective/History: This patient has been seen and evaluated at the request of Dr. Beto Veliz for above. Patient is a 58 y.o. male who presents for above. He was moved to ICU last pm for moderate to     ROS is otherwise negative. Past Medical History:   Diagnosis Date    [facility]ism ([facility] [address]     Hypertension     Ill-defined condition     Hernia     Seizures ([facility] [address]     Withdrawal       Past Surgical History:   Procedure Laterality Date    HX HERNIA REPAIR        Prior to Admission medications    Medication Sig Start Date End Date Taking? Authorizing Provider   hydroCHLOROthiazide (HYDRODIURIL) 12.5 mg tablet Take 1 Tab by mouth daily.  17   AtulSkytop MD Jhoana     Current Facility-Administered Medications   Medication Dose Route Frequency    LORazepam (ATIVAN) tablet 1 mg  1 mg Oral Q8H    dexmedeTOMidine (PRECEDEX) 400 mcg in 0.9% sodium chloride 100 mL infusion  0.2-0.7 mcg/kg/hr IntraVENous TITRATE    thiamine (B-1) 500 mg in 0.9% sodium chloride 50 mL IVPB  500 mg IntraVENous DAILY    mupirocin (BACTROBAN) 2 % ointment   Topical Q12H    aspirin chewable tablet 81 mg  81 mg Oral DAILY    atenolol (TENORMIN) tablet 50 mg  50 mg Oral DAILY    folic acid (FOLVITE) tablet 1 mg  1 mg Oral DAILY    hydroCHLOROthiazide (HYDRODIURIL) tablet 12.5 mg  12.5 mg Oral DAILY    sodium chloride (NS) flush 5-10 mL  5-10 mL IntraVENous Q8H    cyanocobalamin (VITAMIN B12) tablet 1,000 mcg  1,000 mcg Oral DAILY  therapeutic multivitamin (THERAGRAN) tablet 1 Tab  1 Tab Oral DAILY     Allergies   Allergen Reactions    Lisinopril Angioedema      Social History   Substance Use Topics    Smoking status: Never Smoker    Smokeless tobacco: Never Used    Alcohol use Yes      Comment: 1/2-1 pint vodka/day +/- beer      Family History   Problem Relation Age of Onset    Hypertension Other     Breast Cancer Other         ROS is otherwise negative,     Objective:   Vital Signs:    Visit Vitals    /89    Pulse 63    Temp 97.2 °F (36.2 °C)    Resp 13    Ht 5' 9\" (1.753 m)    Wt 77.2 kg (170 lb 3.1 oz)    SpO2 92%    BMI 25.13 kg/m2       O2 Device: Room air       Temp (24hrs), Av.4 °F (36.3 °C), Min:97.2 °F (36.2 °C), Max:97.7 °F (36.5 °C)       Intake/Output:   Last shift:      701 - 1900  In: 20 [I.V.:20]  Out: -   Last 3 shifts: 1901 -  07  In: 200 [P.O.:200]  Out: -       Physical Exam:    General:  Alert, cooperative, no distress, appears stated age. Head:  Normocephalic, without obvious abnormality, atraumatic. Eyes:  Conjunctivae/corneas clear. PERRL, EOMs intact. Nose: Nares normal. Septum midline. Mucosa normal. No drainage or sinus tenderness. Throat: Lips, mucosa, and tongue normal. Teeth and gums normal.   Neck: Supple, symmetrical, trachea midline, no adenopathy, thyroid: no enlargment/tenderness/nodules, no carotid bruit and no JVD. Back:   Symmetric, no curvature. ROM normal.   Lungs:   Clear to auscultation bilaterally. Chest wall:  No tenderness or deformity. Heart:  Regular rate and rhythm, S1, S2 normal, no murmur, click, rub or gallop. Abdomen:   Soft, non-tender. Bowel sounds normal. No masses,  No organomegaly. Extremities: Extremities normal, atraumatic, no cyanosis or edema. Pulses: 2+ and symmetric all extremities.    Skin: Skin color, texture, turgor normal. No rashes or lesions   Lymph nodes: Cervical, supraclavicular, and axillary nodes normal.   Neurologic: Grossly nonfocal       Data:     Recent Results (from the past 24 hour(s))   CBC WITH AUTOMATED DIFF    Collection Time: 08/31/17 11:27 AM   Result Value Ref Range    WBC 2.8 (L) 4.1 - 11.1 K/uL    RBC 3.51 (L) 4.10 - 5.70 M/uL    HGB 12.1 12.1 - 17.0 g/dL    HCT 36.5 (L) 36.6 - 50.3 %    .0 (H) 80.0 - 99.0 FL    MCH 34.5 (H) 26.0 - 34.0 PG    MCHC 33.2 30.0 - 36.5 g/dL    RDW 11.9 11.5 - 14.5 %    PLATELET 923 364 - 535 K/uL    NEUTROPHILS 55 32 - 75 %    LYMPHOCYTES 27 12 - 49 %    MONOCYTES 14 (H) 5 - 13 %    EOSINOPHILS 3 0 - 7 %    BASOPHILS 1 0 - 1 %    ABS. NEUTROPHILS 1.5 (L) 1.8 - 8.0 K/UL    ABS. LYMPHOCYTES 0.8 0.8 - 3.5 K/UL    ABS. MONOCYTES 0.4 0.0 - 1.0 K/UL    ABS. EOSINOPHILS 0.1 0.0 - 0.4 K/UL    ABS.  BASOPHILS 0.0 0.0 - 0.1 K/UL    RBC COMMENTS NORMOCYTIC, NORMOCHROMIC      DF SMEAR SCANNED     METABOLIC PANEL, BASIC    Collection Time: 08/31/17 11:27 AM   Result Value Ref Range    Sodium 139 136 - 145 mmol/L    Potassium 3.1 (L) 3.5 - 5.1 mmol/L    Chloride 102 97 - 108 mmol/L    CO2 31 21 - 32 mmol/L    Anion gap 6 5 - 15 mmol/L    Glucose 47 (LL) 65 - 100 mg/dL    BUN 9 6 - 20 MG/DL    Creatinine 0.76 0.70 - 1.30 MG/DL    BUN/Creatinine ratio 12 12 - 20      GFR est AA >60 >60 ml/min/1.73m2    GFR est non-AA >60 >60 ml/min/1.73m2    Calcium 9.2 8.5 - 10.1 MG/DL   GLUCOSE, POC    Collection Time: 08/31/17 12:18 PM   Result Value Ref Range    Glucose (POC) 170 (H) 65 - 100 mg/dL    Performed by Hillary Agarwal              Telemetry:NSR    Imaging:  I have personally reviewed the patients radiographs and have reviewed the reports:  CXR none       Vivian Cardoso MD

## 2017-09-01 NOTE — PROGRESS NOTES
TRANSFER - OUT REPORT:    Verbal report given to Krishna Dodson RN on Cliff Marquez  being transferred to Neuro room [phone] for routine progression of care       Report consisted of patients Situation, Background, Assessment and   Recommendations(SBAR). Information from the following report(s) SBAR, Intake/Output, MAR and Cardiac Rhythm NSR was reviewed with the receiving nurse. Lines:   Peripheral IV 08/31/17 Left Forearm (Active)   Site Assessment Clean, dry, & intact 9/1/2017  3:58 PM   Phlebitis Assessment 0 9/1/2017  3:58 PM   Infiltration Assessment 0 9/1/2017  3:58 PM   Dressing Status Clean, dry, & intact 9/1/2017  3:58 PM   Dressing Type Tape;Transparent 9/1/2017  3:58 PM   Hub Color/Line Status Blue; Infusing 9/1/2017  3:58 PM   Action Taken Other (comment) 8/31/2017 11:19 AM        Opportunity for questions and clarification was provided. Patient transported with:   Patient-specific medications from Pharmacy  Registered Nurse        Transferred via wheelchair without incident to [phone], sitter in attendance.

## 2017-09-01 NOTE — PROGRESS NOTES
Report received, Male sitter here now, will ask him to assist patient now. Report received. 0800  Talking with Tech, cooperative with care. Labs obtained without difficulty. Will continue to assess agitation or aggressive behavior. Assessment completed. 0920  Currently asleep, will give meds when he awakens. Voided with assistance of Clinton Sleet in bed without difficulty prior to returning to sleep. 1125  1125 Cooperative, following commands and compliant with staff. Restraints removed. Able to answer all questions. Will ask regarding transfer out of unit later today. Sitter remains at bedside. Agrees to ask for help, not get out of bed, and be compliant with staff. 1320   Eating lunch at present, sitter and sister at bedside. 1445  Remains compliant, watching TV. Now up in chair watching TV. Assisted with bath in room. 1505  Still up in chair, appears to be sleeping. 1600  Remains up in chair, napping at intervals. Compliant with therapy, but states wants to go home. Hospitalist has not yet rounded. 2pm Ativan held due to alert, oriented and following commands. CIWA remains low. 1730 Up to toilet to void with little assistance. Reminded again to ask for help since many things are connected to patient. Returned to chair to finish dinner.

## 2017-09-01 NOTE — PROGRESS NOTES
2100 Received care of patient. Patient sleeping at this time. [phone] Patient agitated, trying to get OOB. Confused and combative. Dr. Favio Leos notified, orders received for restraints and 1:1 sitter. Patient's CIWA scale is 15.  0400 Patient again agitated and trying to get Daniel Gang is 15. Unable to draw labs due to patient combative at this time. 0700 Bedside and Verbal shift change report given to Luz Elena Ellis (oncoming nurse) by Bettie Hicks (offgoing nurse). Report included the following information SBAR, Kardex, Procedure Summary, Intake/Output, MAR and Recent Results.

## 2017-09-01 NOTE — INTERDISCIPLINARY ROUNDS
Interdisciplinary team rounds were held 9/1/2017 with the following team members:Nursing, Nutrition, Pharmacy, Physical Therapy and Physician. Plan of care discussed. See clinical pathway and/or care plan for interventions and desired outcomes.

## 2017-09-01 NOTE — PROGRESS NOTES
Problem: Falls - Risk of  Goal: *Absence of Falls  Document Royal Fall Risk and appropriate interventions in the flowsheet.    Outcome: Progressing Towards Goal  Fall Risk Interventions:  Mobility Interventions: Assess mobility with egress test, Bed/chair exit alarm, Communicate number of staff needed for ambulation/transfer     Mentation Interventions: Adequate sleep, hydration, pain control, Bed/chair exit alarm, Door open when patient unattended, Evaluate medications/consider consulting pharmacy, Family/sitter at bedside     Medication Interventions: Bed/chair exit alarm, Evaluate medications/consider consulting pharmacy, Patient to call before getting OOB, Teach patient to arise slowly     Elimination Interventions: Bed/chair exit alarm, Call light in reach, Elevated toilet seat, Patient to call for help with toileting needs

## 2017-09-01 NOTE — PROGRESS NOTES
Hospitalist Progress Note   Patient Name  Andree Washington   Admit date:  8/29/2017   Medical Record Number  [id number]    Age  58 y.o. Date of Birth 1955   PCP Migel Ruby MD    Room Number  8121/43 [facility]     Admission Diagnoses:  Alcohol withdrawal seizure [facility])     Assessment/ Plan:     Sp Leach night of 8/31, CIWA 20, transferred to ICU    Alcohol withdrawal seizure POA  Chronic Heavy Alcoholism  Fatty Liver/elevated LFTs POA   Encephalopathy in the setting of alcohol withdrawal & delirium requiring IV sedatives, IV thiamine & ICU care  -still with delirium, elevated CIWAs and requiring high doses of benzos  -remains anxious and aggressive about going home  -pt had been binging for 2.5 weeks  CT head neg  TBili1.8-> 1.4     Admit to telemetry bed  Load with PO Valium x 3 doses then PO Ativan prn as per the CIWA scoring protocol  IV ativan prn seizures only  Goody bag x 1 in ER, cont PO FA, B12, Thiamine  Seizure precautions  CM consult for - OP alcohol rehab options on discharge   Cessation counseling given in ER- pt seems to be motivated to quit this time     Accelerated HTN  H/o CVA     Cont atenolol, HCTZ        Code Status: Full  Surrogate Decision Maker: wife Kelechi Keller # [phone]     DVT Prophylaxis: Sq lovenox  GI Prophylaxis: IV pepcid     Baseline: Pt is independent at home with wife    UGARTE:    Principal Problem:    Alcohol withdrawal seizure ([facility] [address] (8/29/2017)    Active Problems:    Chronic alcoholism ([facility] [address] (7/9/2017)      Hepatic steatosis (7/9/2017)      Alcoholism /alcohol abuse ([facility] [address] (8/29/2017)      Accelerated hypertension (8/29/2017)      Body mass index is 25.13 kg/(m^2).     Functional Status  good   CODE STATUS  Full   Surrogate decision maker: Pt is competent   Prophylaxis  None needed, pt now ambulant   Discharge Plan: Home w/Family,       Payor: BLUE CROSS / Plan: [facility] PPO / Product Type: PPO /    Query No queries noted madie button not showing )        Subjective:   Still angry about not being able to go home  Seems coherent, but is actually not oriented to person place and time  Overnight, was delirious,trasferred to ICU, but precedex was not actually started      Objective:     Physical Exam:    Gen: Upset,sitting up in bed  HEENT:  Pink conjunctivae, PERRL, hearing intact to voice, moist mucous membranes  Neck:  Supple, without masses, thyroid non-tender  Resp:  No accessory muscle use, clear breath sounds without wheezes rales or rhonchi  Card:  No murmurs, normal S1, S2 without thrills, bruits or peripheral edema  Abd:  Soft, non-tender, non-distended, normoactive bowel sounds are present, no palpable organomegaly  Lymph:  No cervical adenopathy  Musc:  No cyanosis or clubbing  Skin:  No rashes or ulcers, skin turgor is good  Neuro:  Cranial nerves 3-12 are grossly intact,  strength is 5/5 bilaterally, dorsi / plantarflexion strength is 5/5 bilaterally, follows commands appropriately  Psych:  Alert with good insight. Oriented to person, place, and time       09/01 0701 - 09/01 1900  In: 320 [P.O.:300; I.V.:20]  Out: 625 [Urine:625]    Intake/Output Summary (Last 24 hours) at 09/01/17 0959  Last data filed at 09/01/17 0909   Gross per 24 hour   Intake              320 ml   Output              625 ml   Net             -305 ml    Weight change: Wt Readings from Last 10 Encounters:   08/29/17 77.2 kg (170 lb 3.1 oz)   07/15/17 83.1 kg (183 lb 3.2 oz)   02/01/17 78.8 kg (173 lb 11.6 oz)   01/24/17 79.2 kg (174 lb 9.7 oz)   11/03/16 73.9 kg (163 lb)   09/23/16 81.3 kg (179 lb 3.7 oz)   07/11/16 83.4 kg (183 lb 13.8 oz)   12/08/15 71.4 kg (157 lb 6.5 oz)   07/30/14 79.7 kg (175 lb 11.3 oz)   12/20/13 78 kg (172 lb)        Relevant informations:      Other medical conditions listed in this following active hospital problem list section; all of these and other pertinent data were taken into consideration when treatment plan is developed and customized to this patient's unique overall circumstances and needs.    Patient Active Problem List    Diagnosis Date Noted    Alcoholism /alcohol abuse ([facility] 08/29/2017    Accelerated hypertension 08/29/2017    Alcohol withdrawal seizure ([facility] 08/29/2017    Chronic alcoholism ([facility] 07/09/2017    ARF (acute renal failure) ([facility] 07/09/2017    HTN (hypertension) 07/09/2017     Class: Chronic    Hepatic steatosis 07/09/2017     Class: Chronic    History of CVA (cerebrovascular accident) 07/09/2017     Class: Present on Admission    UTI (urinary tract infection) 07/09/2017     Class: Present on Admission          Data Review:   Recent Days:  Recent Labs      09/01/17   0804  08/31/17   1127  08/30/17   0251   WBC  3.1*  2.8*  2.8*   HGB  12.7  12.1  12.4   HCT  38.6  36.5*  38.7   PLT  226  195  205     Recent Labs      09/01/17   0804 08/31/17   1127  08/30/17   0251   NA  139  139  133*   K  3.8  3.1*  3.5   CL  104  102  99   CO2  31  31  28   GLU  89  47*  109*   BUN  12  9  6   CREA  0.80  0.76  0.79   CA  9.2  9.2  9.3   MG  1.9   --   2.0   ALB   --    --   3.6   TBILI   --    --   1.4*   SGOT   --    --   38*   ALT   --    --   33     Lab Results   Component Value Date/Time    TSH 2.64 12/04/2015 01:17 PM     ______________________________________________________________________________________________________    Medications reviewed     Current Facility-Administered Medications   Medication Dose Route Frequency    LORazepam (ATIVAN) tablet 1 mg  1 mg Oral Q8H    diazePAM (VALIUM) injection 10 mg  10 mg IntraVENous Q1H PRN    diazePAM (VALIUM) injection 20 mg  20 mg IntraVENous Q1H PRN    thiamine (B-1) 500 mg in 0.9% sodium chloride 50 mL IVPB  500 mg IntraVENous DAILY    mupirocin (BACTROBAN) 2 % ointment   Topical Q12H    aspirin chewable tablet 81 mg  81 mg Oral DAILY    atenolol (TENORMIN) tablet 50 mg  50 mg Oral DAILY    folic acid (FOLVITE) tablet 1 mg  1 mg Oral DAILY    hydroCHLOROthiazide (HYDRODIURIL) tablet 12.5 mg  12.5 mg Oral DAILY    sodium chloride (NS) flush 5-10 mL  5-10 mL IntraVENous Q8H    sodium chloride (NS) flush 5-10 mL  5-10 mL IntraVENous PRN    ondansetron (ZOFRAN) injection 4 mg  4 mg IntraVENous Q4H PRN    LORazepam (ATIVAN) injection 1 mg  1 mg IntraVENous Q5MIN PRN    cyanocobalamin (VITAMIN B12) tablet 1,000 mcg  1,000 mcg Oral DAILY    therapeutic multivitamin (THERAGRAN) tablet 1 Tab  1 Tab Oral DAILY    cloNIDine HCl (CATAPRES) tablet 0.1 mg  0.1 mg Oral Q8H PRN       ______________________________________________________________________________________________________  Care Plan discussed with: Patient/Family  ____________________________________________________________________________________________________    High complexity decision making was performed for this patient who is at high risk for decompensation with multiple organ involvement. Today total floor/unit time was 25 minutes while caring for this patient and greater than 50% of that time was spent with patient (and/or family) discussing patients clinical issues.   ______________________________________________________________________________________________________  Avani Williamson MD                      9/1/2017

## 2017-09-01 NOTE — PROGRESS NOTES
Pt transferred to CCU 2542 from [facility].  VSS. Alert and oriented x2-3. Confused as to where he used to work and where he is at times. Pleasant at this time. Sitting up in bed talking to wife and nurse. Sitter in room also. 2100: Thiamine additive started without difficulty. Pt still calm, talking about football.

## 2017-09-01 NOTE — CDMP QUERY
Account Number: [de-identified]  MRN: [id number]  Patient: Kuldip Bueno  Created: [id number]  Clinician Name: Frank Das RN, CCDS   Dr. Silvestre Kocher :  Please clarify if this patient is being treated/managed for:    => Encephalopathy in the setting of alcohol withdrawal & delirium requiring IV sedatives, IV thiamine & ICU care. =>Other Explanation of clinical findings  =>Unable to Determine (no explanation of clinical findings)    The medical record reflects the following clinical findings, treatment, and risk factors:    Risk Factors: ETOH binging, withdrawal/seizures  Clinical Indicators: CIWA 20, confused , combative, agitated, trying to get OOB. Treatment: 1:1 sitter, IV thiamine high dose, IV ativan , goody bag, frequent orientation, prn restraints, IV valium & transferred to ICU. Please clarify and document your clinical opinion in the progress notes and discharge summary including the definitive and/or presumptive diagnosis, (suspected or probable), related to the above clinical findings. Please include clinical findings supporting your diagnosis.     Thank Kishore Blue RN, CCDS

## 2017-09-02 VITALS
HEIGHT: 69 IN | TEMPERATURE: 98.7 F | WEIGHT: 170.19 LBS | SYSTOLIC BLOOD PRESSURE: 130 MMHG | BODY MASS INDEX: 25.21 KG/M2 | HEART RATE: 69 BPM | OXYGEN SATURATION: 99 % | RESPIRATION RATE: 18 BRPM | DIASTOLIC BLOOD PRESSURE: 81 MMHG

## 2017-09-02 LAB
ALBUMIN SERPL-MCNC: 3.3 G/DL (ref 3.5–5)
ALBUMIN/GLOB SERPL: 0.9 {RATIO} (ref 1.1–2.2)
ALP SERPL-CCNC: 68 U/L (ref 45–117)
ALT SERPL-CCNC: 48 U/L (ref 12–78)
ANION GAP SERPL CALC-SCNC: 8 MMOL/L (ref 5–15)
AST SERPL-CCNC: 42 U/L (ref 15–37)
BASOPHILS # BLD: 0 K/UL (ref 0–0.1)
BASOPHILS NFR BLD: 1 % (ref 0–1)
BILIRUB SERPL-MCNC: 0.6 MG/DL (ref 0.2–1)
BUN SERPL-MCNC: 12 MG/DL (ref 6–20)
BUN/CREAT SERPL: 16 (ref 12–20)
CALCIUM SERPL-MCNC: 9.1 MG/DL (ref 8.5–10.1)
CHLORIDE SERPL-SCNC: 102 MMOL/L (ref 97–108)
CO2 SERPL-SCNC: 26 MMOL/L (ref 21–32)
CREAT SERPL-MCNC: 0.77 MG/DL (ref 0.7–1.3)
EOSINOPHIL # BLD: 0.1 K/UL (ref 0–0.4)
EOSINOPHIL NFR BLD: 4 % (ref 0–7)
ERYTHROCYTE [DISTWIDTH] IN BLOOD BY AUTOMATED COUNT: 11.7 % (ref 11.5–14.5)
GLOBULIN SER CALC-MCNC: 3.8 G/DL (ref 2–4)
GLUCOSE SERPL-MCNC: 126 MG/DL (ref 65–100)
HCT VFR BLD AUTO: 37.6 % (ref 36.6–50.3)
HGB BLD-MCNC: 12.3 G/DL (ref 12.1–17)
LYMPHOCYTES # BLD: 0.9 K/UL (ref 0.8–3.5)
LYMPHOCYTES NFR BLD: 30 % (ref 12–49)
MAGNESIUM SERPL-MCNC: 1.9 MG/DL (ref 1.6–2.4)
MCH RBC QN AUTO: 33.4 PG (ref 26–34)
MCHC RBC AUTO-ENTMCNC: 32.7 G/DL (ref 30–36.5)
MCV RBC AUTO: 102.2 FL (ref 80–99)
MONOCYTES # BLD: 0.5 K/UL (ref 0–1)
MONOCYTES NFR BLD: 16 % (ref 5–13)
NEUTS SEG # BLD: 1.4 K/UL (ref 1.8–8)
NEUTS SEG NFR BLD: 49 % (ref 32–75)
PLATELET # BLD AUTO: 233 K/UL (ref 150–400)
POTASSIUM SERPL-SCNC: 3.3 MMOL/L (ref 3.5–5.1)
PROT SERPL-MCNC: 7.1 G/DL (ref 6.4–8.2)
RBC # BLD AUTO: 3.68 M/UL (ref 4.1–5.7)
SODIUM SERPL-SCNC: 136 MMOL/L (ref 136–145)
WBC # BLD AUTO: 2.9 K/UL (ref 4.1–11.1)

## 2017-09-02 PROCEDURE — 85025 COMPLETE CBC W/AUTO DIFF WBC: CPT | Performed by: STUDENT IN AN ORGANIZED HEALTH CARE EDUCATION/TRAINING PROGRAM

## 2017-09-02 PROCEDURE — 74011250637 HC RX REV CODE- 250/637: Performed by: STUDENT IN AN ORGANIZED HEALTH CARE EDUCATION/TRAINING PROGRAM

## 2017-09-02 PROCEDURE — 74011250637 HC RX REV CODE- 250/637: Performed by: INTERNAL MEDICINE

## 2017-09-02 PROCEDURE — 74011250636 HC RX REV CODE- 250/636: Performed by: INTERNAL MEDICINE

## 2017-09-02 PROCEDURE — 76937 US GUIDE VASCULAR ACCESS: CPT

## 2017-09-02 PROCEDURE — 36600 WITHDRAWAL OF ARTERIAL BLOOD: CPT

## 2017-09-02 PROCEDURE — 83735 ASSAY OF MAGNESIUM: CPT | Performed by: STUDENT IN AN ORGANIZED HEALTH CARE EDUCATION/TRAINING PROGRAM

## 2017-09-02 PROCEDURE — 74011000258 HC RX REV CODE- 258: Performed by: INTERNAL MEDICINE

## 2017-09-02 PROCEDURE — 80053 COMPREHEN METABOLIC PANEL: CPT | Performed by: STUDENT IN AN ORGANIZED HEALTH CARE EDUCATION/TRAINING PROGRAM

## 2017-09-02 PROCEDURE — 36415 COLL VENOUS BLD VENIPUNCTURE: CPT | Performed by: STUDENT IN AN ORGANIZED HEALTH CARE EDUCATION/TRAINING PROGRAM

## 2017-09-02 PROCEDURE — 74011250637 HC RX REV CODE- 250/637: Performed by: NURSE PRACTITIONER

## 2017-09-02 RX ORDER — CHLORDIAZEPOXIDE HYDROCHLORIDE 25 MG/1
25 CAPSULE, GELATIN COATED ORAL ONCE
Status: COMPLETED | OUTPATIENT
Start: 2017-09-02 | End: 2017-09-02

## 2017-09-02 RX ADMIN — MUPIROCIN: 20 OINTMENT TOPICAL at 09:08

## 2017-09-02 RX ADMIN — HYDROCHLOROTHIAZIDE 12.5 MG: 25 TABLET ORAL at 09:07

## 2017-09-02 RX ADMIN — THIAMINE HYDROCHLORIDE 500 MG: 100 INJECTION, SOLUTION INTRAMUSCULAR; INTRAVENOUS at 09:46

## 2017-09-02 RX ADMIN — CYANOCOBALAMIN TAB 500 MCG 1000 MCG: 500 TAB at 09:07

## 2017-09-02 RX ADMIN — CHLORDIAZEPOXIDE HYDROCHLORIDE 10 MG: 5 CAPSULE ORAL at 09:07

## 2017-09-02 RX ADMIN — FOLIC ACID 1 MG: 1 TABLET ORAL at 09:08

## 2017-09-02 RX ADMIN — ASPIRIN 81 MG 81 MG: 81 TABLET ORAL at 09:06

## 2017-09-02 RX ADMIN — Medication 10 ML: at 04:26

## 2017-09-02 RX ADMIN — CHLORDIAZEPOXIDE HYDROCHLORIDE 25 MG: 25 CAPSULE ORAL at 14:33

## 2017-09-02 RX ADMIN — ATENOLOL 50 MG: 50 TABLET ORAL at 09:07

## 2017-09-02 RX ADMIN — THERA TABS 1 TABLET: TAB at 09:08

## 2017-09-02 NOTE — PROGRESS NOTES
Cm acknowledged discharge order. Jez Marcelo RN asked KATLIN to review chart for discharge needs. PCP follow up appointment has been scheduled. Patient declined resources for substance abuse. Patient has no further needs at this time.     Lenin Pope RN CM  Ext [phone]

## 2017-09-02 NOTE — DISCHARGE SUMMARY
Physician Discharge Summary     Pt Name  Stormy Gregg   Admit date:  8/29/2017   Discharge date and time:  9/2/2017 1:21 PM    Room Number  3121/01    Medical Record Number  [id number] @ [facility]   Age  58 y.o. Date of Birth 1955   PCP Shalini Downs MD     Admission Diagnoses:                        Alcohol withdrawal seizure [facility])     Hospital Problems  Date Reviewed: 8/29/2017          Codes Class Noted POA    Alcoholism /alcohol abuse ([facility] [address] ICD-10-CM: F10.20  ICD-9-CM: 303.90  8/29/2017 Yes        Accelerated hypertension ICD-10-CM: I10  ICD-9-CM: 401.0  8/29/2017 Yes        * (Principal)Alcohol withdrawal seizure ([facility] [address] ICD-10-CM: F10.239, R56.9  ICD-9-CM: 291.81, 780.39  8/29/2017 Yes        Chronic alcoholism ([facility] [address] ICD-10-CM: F10.20  ICD-9-CM: 303.90  7/9/2017 Yes        Hepatic steatosis ICD-10-CM: K76.0  ICD-9-CM: 571.8 Chronic 7/9/2017 Yes               Allergies   Allergen Reactions    Lisinopril Angioedema        Excerpt from HPI : Seizure at home  Stormy Gregg is a 58 y.o.  male who presents with above complains from home ambulatory. Pt presents with CC of alcohol withdrawal seizure x 1 yesterday AM.  H/o similar seizure episodes when pt cuts down on alcohol - trying to quit  Last alcoholic drink was yesterday PM (1 beer) after all day of no drinking as per the wife. H/o binge alcohol use over the weekend - all weekend as per the wife- has a history of binge drinking occasionally. Pt has desired to stop drinking & so had cut down on it- which gave him seizure yesterday AM.     Pt still having high CIWA score in ER when seen by me.     We were asked to admit for work up and evaluation of the above problems. Hospital Course: This pt was admitted with  Alcohol withdrawal seizure ([facility] [address] on 8/29/2017.     Delerious night of 8/31, CIWA 20, transferred to ICU  Pts stay was marked by intermittent delirium, strong desire to leave hospital, but he was slowly tapered down and improved     Alcohol withdrawal seizure POA  Chronic Heavy Alcoholism  Fatty Liver/elevated LFTs POA   -still with delirium, elevated CIWAs and requiring high doses of benzos  -remains anxious and aggressive about going home  -pt had been binging for 2.5 weeks  CT head neg  TBili1.8-> 1.4      Admit to telemetry bed  Load with PO Valium x 3 doses then PO Ativan prn as per the CIWA scoring protocol  IV ativan prn seizures only  Goody bag x 1 in ER, cont PO FA, B12, Thiamine  Seizure precautions  CM consult for - OP alcohol rehab options on discharge   Cessation counseling given in ER- pt seems to be motivated to quit this time      Accelerated HTN  H/o CVA      Cont atenolol, HCTZ          Code Status: Full  Surrogate Decision Maker: wife Kolby Huang # [phone]      DVT Prophylaxis: Sq lovenox  GI Prophylaxis: IV pepcid      Baseline: Pt is independent at home with wife        Condition at the time of discharge improved and stable .      Query:   Treatment team[de-identified] Treatment Team: Utilization Review: Lucas Souza       Other Pertinent data:   TODAY's CLINICAL FINDINGS:   Visit Vitals    /81 (BP 1 Location: Left arm, BP Patient Position: Sitting)    Pulse 69    Temp 98.7 °F (37.1 °C)    Resp 18    Ht 5' 9\" (1.753 m)    Wt 77.2 kg (170 lb 3.1 oz)    SpO2 99%    BMI 25.13 kg/m2      Wt Readings from Last 10 Encounters:   08/29/17 77.2 kg (170 lb 3.1 oz)   07/15/17 83.1 kg (183 lb 3.2 oz)   02/01/17 78.8 kg (173 lb 11.6 oz)   01/24/17 79.2 kg (174 lb 9.7 oz)   11/03/16 73.9 kg (163 lb)   09/23/16 81.3 kg (179 lb 3.7 oz)   07/11/16 83.4 kg (183 lb 13.8 oz)   12/08/15 71.4 kg (157 lb 6.5 oz)   07/30/14 79.7 kg (175 lb 11.3 oz)   12/20/13 78 kg (172 lb)       Physical Exam:    Gen:  Well-developed, well-nourished, in no acute distress  HEENT:  Pink conjunctivae, PERRL, hearing intact to voice, moist mucous membranes  Neck:  Supple, without masses, thyroid non-tender  Resp:  No accessory muscle use, clear breath sounds without wheezes rales or rhonchi  Card:  No murmurs, normal S1, S2 without thrills, bruits or peripheral edema  Abd:  Soft, non-tender, non-distended, normoactive bowel sounds are present, no palpable organomegaly  Lymph:  No cervical adenopathy  Musc:  No cyanosis or clubbing  Skin:  No rashes or ulcers, skin turgor is good  Neuro:  Cranial nerves 3-12 are grossly intact,  strength is 5/5 bilaterally, dorsi / plantarflexion strength is 5/5 bilaterally, follows commands appropriately  Psych:  Alert with good insight. Oriented to person, place, and time       Disposition:Home. Diet: Regular Diet   Care Plan discussed with: Patient/Family   Follow up   Follow-up Information     Follow up With Details Comments Contact Richard Galarza MD Go on 9/6/2017 PCP follow up at 3:15 PM [address]    [address]  [phone]      Substance Abuse Resources  You have been given a packet of substance abuse resources. Please call the number listed above for information about AA meetings in your area. Alcoholics Anonymous [facility] [phone]         Discharge Medication List as of 9/2/2017  2:13 PM      CONTINUE these medications which have NOT CHANGED    Details   hydroCHLOROthiazide (HYDRODIURIL) 12.5 mg tablet Take 1 Tab by mouth daily. , Print, Disp-30 Tab, R-1         STOP taking these medications       aspirin 81 mg chewable tablet Comments:   Reason for Stopping:         atenolol (TENORMIN) 50 mg tablet Comments:   Reason for Stopping:         folic acid (FOLVITE) 1 mg tablet Comments:   Reason for Stopping:                  Significant Diagnostic Studies:   Recent Labs      09/02/17   0459 09/01/17   0804   WBC  2.9*  3.1*   HGB  12.3  12.7   HCT  37.6  38.6   PLT  233  226     Recent Labs      09/02/17   0459  09/01/17   0804  08/31/17   1127   NA  136  139  139   K  3.3*  3.8  3.1*   CL  102  104  102   CO2  26  31  31   BUN 12  12  9   CREA  0.77  0.80  0.76   GLU  126*  89  47*   CA  9.1  9.2  9.2   MG  1.9  1.9   --      Recent Labs      09/02/17   0459   SGOT  42*   ALT  48   AP  68   TBILI  0.6   TP  7.1   ALB  3.3*   GLOB  3.8     No results for input(s): INR, PTP, APTT in the last 72 hours. No lab exists for component: INREXT, INREXT   No results for input(s): FE, TIBC, PSAT, FERR in the last 72 hours. No results for input(s): PH, PCO2, PO2 in the last 72 hours. No results for input(s): CPK, CKMB in the last 72 hours. No lab exists for component: TROPONINI  Lab Results   Component Value Date/Time    Glucose (POC) 170 08/31/2017 12:18 PM    Glucose (POC) 84 12/03/2015 09:34 AM           ________________________________________________________________________    Manual Four States Time for face to face meeting with the pt and examination including care coordination with staff and chart review (>50% of total time listed here )  approx.  30 min]    ________________________________________________________________________    Jolene Coles MD  9/2/2017

## 2017-09-02 NOTE — PROGRESS NOTES
Patient discharged home. IV was removed and educated patient on the importance to stop drinking. All questions were answered.

## 2017-09-02 NOTE — ROUTINE PROCESS
* No surgery found *  * No surgery found *  Bedside and Verbal shift change report given to [address] (oncoming nurse) by Samuel Heath RN (offgoing nurse). Report included the following information SBAR, Kardex, MAR and Recent Results. Zone Phone:   [phone]      Significant changes during shift:  xfer to [facility]ST[facility]        Patient Information    Stormy Gregg  58 y.o.  8/29/2017  1:52 AM by Wilfredo Jordan MD. Stormy Gregg was admitted from Home    Problem List    Patient Active Problem List    Diagnosis Date Noted    Alcoholism /alcohol abuse ([address] 08/29/2017    Accelerated hypertension 08/29/2017    Alcohol withdrawal seizure ([address] 08/29/2017    Chronic alcoholism ([facility] 07/09/2017    ARF (acute renal failure) ([address] 07/09/2017    HTN (hypertension) 07/09/2017     Class: Chronic    Hepatic steatosis 07/09/2017     Class: Chronic    History of CVA (cerebrovascular accident) 07/09/2017     Class: Present on Admission    UTI (urinary tract infection) 07/09/2017     Class: Present on Admission     Past Medical History:   Diagnosis Date    Alcoholism ([address]     Hypertension     Ill-defined condition     Hernia     Seizures ([address]     Withdrawal          Core Measures:    CVA: No Not applicable  CHF:No Not applicable  PNA:No Not applicable      Activity Status:    OOB to Chair No  Ambulated this shift Yes   Bed Rest No    Supplemental O2: (If Applicable)    NC No  NRB No  Venti-mask No  On 0 Liters/min      LINES AND DRAINS: PIV only        DVT prophylaxis:    DVT prophylaxis Med- No  DVT prophylaxis SCD or OLIVERIO- Yes refused    Wounds: (If Applicable)    Wounds- No    Location none    Patient Safety:    Falls Score Total Score: 2  Safety Level_______  Bed Alarm On? Yes  Sitter?  Yes    Plan for upcoming shift: safety, CIWA q4        Discharge Plan: Yes plan to d/c home when stable    Active Consults:  None

## 2017-10-29 ENCOUNTER — HOSPITAL ENCOUNTER (INPATIENT)
Age: 62
LOS: 4 days | Discharge: HOME | DRG: 897 | End: 2017-11-03
Attending: EMERGENCY MEDICINE | Admitting: INTERNAL MEDICINE
Payer: COMMERCIAL

## 2017-10-29 DIAGNOSIS — F10.930 ALCOHOL WITHDRAWAL SYNDROME WITHOUT COMPLICATION (HCC): ICD-10-CM

## 2017-10-29 DIAGNOSIS — F10.929 ALCOHOLIC INTOXICATION WITH COMPLICATION (HCC): Primary | ICD-10-CM

## 2017-10-29 DIAGNOSIS — E87.20 LACTIC ACIDOSIS: ICD-10-CM

## 2017-10-29 DIAGNOSIS — I10 ACCELERATED HYPERTENSION: ICD-10-CM

## 2017-10-29 DIAGNOSIS — R79.89 LACTATE BLOOD INCREASED: ICD-10-CM

## 2017-10-29 PROCEDURE — 99285 EMERGENCY DEPT VISIT HI MDM: CPT

## 2017-10-29 RX ORDER — SODIUM CHLORIDE 0.9 % (FLUSH) 0.9 %
5-10 SYRINGE (ML) INJECTION EVERY 8 HOURS
Status: DISCONTINUED | OUTPATIENT
Start: 2017-10-29 | End: 2017-11-03 | Stop reason: HOSPADM

## 2017-10-29 RX ORDER — LORAZEPAM 2 MG/ML
1 INJECTION INTRAMUSCULAR
Status: COMPLETED | OUTPATIENT
Start: 2017-10-29 | End: 2017-10-30

## 2017-10-29 RX ORDER — SODIUM CHLORIDE 0.9 % (FLUSH) 0.9 %
5-10 SYRINGE (ML) INJECTION AS NEEDED
Status: DISCONTINUED | OUTPATIENT
Start: 2017-10-29 | End: 2017-11-03 | Stop reason: HOSPADM

## 2017-10-29 NOTE — IP AVS SNAPSHOT
[address] [facility]. 
[phone] Patient: Eva Redmond MRN: [id number] HBA:[birth year] You are allergic to the following Allergen Reactions Lisinopril Angioedema Recent Documentation Height Weight BMI Smoking Status 1.753 m 77.1 kg 25.1 kg/m2 Never Smoker Emergency Contacts  (Rel.) Home Phone Work Phone Mobile Phone Edwige Norris (Spouse) -- [phone] About your hospitalization You were admitted on:  October 30, 2017 You last received care in the:  [address] You were discharged on:  November 3, 2017 Why you were hospitalized Your primary diagnosis was:  Not on File Your diagnoses also included:  Delirium Tremens (Hcc) Providers Seen During Your Hospitalization Provider Specialty Primary office phone Chaya Norton MD Emergency Medicine [phone] Latesha Benedict MD Internal Medicine [phone] Christy Courtney MD Internal Medicine [phone] Your Primary Care Physician (PCP) Primary Care Physician Office Phone Office Fax Shane JAMES [phone] Follow-up Information Follow up With Details Comments Contact Info Mohamud Fernandez MD Go on 11/7/2017 You have a hospital follow-up appointment with Dr. Robe Pimentel on 11/7/2017 at 1:30pm.   [address]  [address]. 
 
[address] [phone] My Medications TAKE these medications as instructed Instructions Each Dose to Equal  
 Morning Noon Evening Bedtime  
 folic acid 1 mg tablet Commonly known as:  [employer] Start taking on:  11/4/2017 Your last dose was: Your next dose is: Take 1 Tab by mouth daily. 1 mg  
    
   
   
   
  
 hydroCHLOROthiazide 12.5 mg tablet Commonly known as:  HYDRODIURIL  
   
 Your last dose was: Your next dose is: Take 1 Tab by mouth daily. 12.5 mg LORazepam 0.5 mg tablet Commonly known as:  ATIVAN Your last dose was: Your next dose is: Take 2 Tabs by mouth every eight (8) hours as needed for Anxiety. Max Daily Amount: 3 mg.  
 1 mg  
    
   
   
   
  
 potassium chloride SR 10 mEq tablet Commonly known as:  KLOR-CON 10 Your last dose was: Your next dose is: Take 1 Tab by mouth daily. 10 mEq  
    
   
   
   
  
 therapeutic multivitamin tablet Commonly known as:  [facility] Start taking on:  11/4/2017 Your last dose was: Your next dose is: Take 1 Tab by mouth daily. 1 Tab  
    
   
   
   
  
 thiamine 100 mg tablet Commonly known as:  B-1 Start taking on:  11/4/2017 Your last dose was: Your next dose is: Take 1 Tab by mouth daily. 100 mg Where to Get Your Medications Information on where to get these meds will be given to you by the nurse or doctor. ! Ask your nurse or doctor about these medications  
  folic acid 1 mg tablet LORazepam 0.5 mg tablet  
 potassium chloride SR 10 mEq tablet  
 therapeutic multivitamin tablet  
 thiamine 100 mg tablet Discharge Instructions Take Ativan only  for anxiety or tremors Discharge Orders None [facility] Announcement We are excited to announce that we are making your provider's discharge notes available to you in Personetat. You will see these notes when they are completed and signed by the physician that discharged you from your recent hospital stay. If you have any questions or concerns about any information you see in [facility], please call the Health Information Department where you were seen or reach out to your Primary Care Provider for more information about your plan of care. Introducing [facility]! [facility] introduces [employer]t patient portal. Now you can access parts of your medical record, email your doctor's office, and request medication refills online. 1. In your internet browser, go to [email or web address] 2. Click on the First Time User? Click Here link in the Sign In box. You will see the New Member Sign Up page. 3. Enter your Nerd [employer] Access Code exactly as it appears below. You will not need to use this code after youve completed the sign-up process. If you do not sign up before the expiration date, you must request a new code. · Nerd Attack Access Code: [facility] Expires: 1/28/2018 12:10 AM 
 
4. Enter the last four digits of your Social Security Number (xxxx) and Date of Birth (mm/dd/yyyy) as indicated and click Submit. You will be taken to the next sign-up page. 5. Create a Nerd Attack ID. This will be your Nerd Attack login ID and cannot be changed, so think of one that is secure and easy to remember. 6. Create a N[employer] Attack password. You can change your password at any time. 7. Enter your Password Reset Question and Answer. This can be used at a later time if you forget your password. 8. Enter your e-mail address. You will receive e-mail notification when new information is available in [facility] 9. Click Sign Up. You can now view and download portions of your medical record. 10. Click the Download Summary menu link to download a portable copy of your medical information. If you have questions, please visit the Frequently Asked Questions section of the N[employer] website. Remember, N[employer] is NOT to be used for urgent needs. For medical emergencies, dial 911. Now available from your iPhone and Android! General Information Please provide this summary of care documentation to your next provider. Patient Signature:  ____________________________________________________________ Date:  ____________________________________________________________  
  
Althia Kras Provider Signature:  ____________________________________________________________ Date:  ____________________________________________________________

## 2017-10-30 PROBLEM — F10.931 DELIRIUM TREMENS (HCC): Status: ACTIVE | Noted: 2017-10-30

## 2017-10-30 LAB
ALBUMIN SERPL-MCNC: 3.6 G/DL (ref 3.5–5)
ALBUMIN SERPL-MCNC: 4.4 G/DL (ref 3.5–5)
ALBUMIN/GLOB SERPL: 0.9 {RATIO} (ref 1.1–2.2)
ALBUMIN/GLOB SERPL: 0.9 {RATIO} (ref 1.1–2.2)
ALP SERPL-CCNC: 56 U/L (ref 45–117)
ALP SERPL-CCNC: 72 U/L (ref 45–117)
ALT SERPL-CCNC: 53 U/L (ref 12–78)
ALT SERPL-CCNC: 68 U/L (ref 12–78)
AMPHET UR QL SCN: NEGATIVE
ANION GAP SERPL CALC-SCNC: 25 MMOL/L (ref 5–15)
ANION GAP SERPL CALC-SCNC: 8 MMOL/L (ref 5–15)
APPEARANCE UR: CLEAR
AST SERPL-CCNC: 122 U/L (ref 15–37)
AST SERPL-CCNC: 89 U/L (ref 15–37)
ATRIAL RATE: 125 BPM
BACTERIA URNS QL MICRO: NEGATIVE /HPF
BARBITURATES UR QL SCN: NEGATIVE
BASOPHILS # BLD: 0 K/UL (ref 0–0.1)
BASOPHILS NFR BLD: 1 % (ref 0–1)
BENZODIAZ UR QL: POSITIVE
BILIRUB SERPL-MCNC: 1.8 MG/DL (ref 0.2–1)
BILIRUB SERPL-MCNC: 2.3 MG/DL (ref 0.2–1)
BILIRUB UR QL: NEGATIVE
BUN SERPL-MCNC: 3 MG/DL (ref 6–20)
BUN SERPL-MCNC: 3 MG/DL (ref 6–20)
BUN/CREAT SERPL: 3 (ref 12–20)
BUN/CREAT SERPL: 4 (ref 12–20)
CALCIUM SERPL-MCNC: 8.2 MG/DL (ref 8.5–10.1)
CALCIUM SERPL-MCNC: 9.5 MG/DL (ref 8.5–10.1)
CALCULATED P AXIS, ECG09: 30 DEGREES
CALCULATED R AXIS, ECG10: 20 DEGREES
CALCULATED T AXIS, ECG11: -52 DEGREES
CANNABINOIDS UR QL SCN: NEGATIVE
CHLORIDE SERPL-SCNC: 102 MMOL/L (ref 97–108)
CHLORIDE SERPL-SCNC: 94 MMOL/L (ref 97–108)
CO2 SERPL-SCNC: 16 MMOL/L (ref 21–32)
CO2 SERPL-SCNC: 27 MMOL/L (ref 21–32)
COCAINE UR QL SCN: NEGATIVE
COLOR UR: ABNORMAL
CREAT SERPL-MCNC: 0.68 MG/DL (ref 0.7–1.3)
CREAT SERPL-MCNC: 1.1 MG/DL (ref 0.7–1.3)
DIAGNOSIS, 93000: NORMAL
DIFFERENTIAL METHOD BLD: ABNORMAL
DRUG SCRN COMMENT,DRGCM: ABNORMAL
EOSINOPHIL # BLD: 0 K/UL (ref 0–0.4)
EOSINOPHIL NFR BLD: 0 % (ref 0–7)
EPITH CASTS URNS QL MICRO: ABNORMAL /LPF
ERYTHROCYTE [DISTWIDTH] IN BLOOD BY AUTOMATED COUNT: 12.8 % (ref 11.5–14.5)
ETHANOL SERPL-MCNC: 57 MG/DL
GLOBULIN SER CALC-MCNC: 3.9 G/DL (ref 2–4)
GLOBULIN SER CALC-MCNC: 4.7 G/DL (ref 2–4)
GLUCOSE SERPL-MCNC: 101 MG/DL (ref 65–100)
GLUCOSE SERPL-MCNC: 110 MG/DL (ref 65–100)
GLUCOSE UR STRIP.AUTO-MCNC: NEGATIVE MG/DL
HCT VFR BLD AUTO: 37.5 % (ref 36.6–50.3)
HGB BLD-MCNC: 12.9 G/DL (ref 12.1–17)
HGB UR QL STRIP: ABNORMAL
HYALINE CASTS URNS QL MICRO: ABNORMAL /LPF (ref 0–5)
KETONES UR QL STRIP.AUTO: ABNORMAL MG/DL
LACTATE SERPL-SCNC: 1.6 MMOL/L (ref 0.4–2)
LACTATE SERPL-SCNC: 16.5 MMOL/L (ref 0.4–2)
LACTATE SERPL-SCNC: 3.7 MMOL/L (ref 0.4–2)
LEUKOCYTE ESTERASE UR QL STRIP.AUTO: NEGATIVE
LYMPHOCYTES # BLD: 0.2 K/UL (ref 0.8–3.5)
LYMPHOCYTES NFR BLD: 6 % (ref 12–49)
MAGNESIUM SERPL-MCNC: 2.2 MG/DL (ref 1.6–2.4)
MCH RBC QN AUTO: 35.1 PG (ref 26–34)
MCHC RBC AUTO-ENTMCNC: 34.4 G/DL (ref 30–36.5)
MCV RBC AUTO: 102.2 FL (ref 80–99)
METHADONE UR QL: NEGATIVE
MONOCYTES # BLD: 0.3 K/UL (ref 0–1)
MONOCYTES NFR BLD: 9 % (ref 5–13)
NEUTS SEG # BLD: 3.1 K/UL (ref 1.8–8)
NEUTS SEG NFR BLD: 84 % (ref 32–75)
NITRITE UR QL STRIP.AUTO: NEGATIVE
OPIATES UR QL: NEGATIVE
P-R INTERVAL, ECG05: 164 MS
PCP UR QL: NEGATIVE
PH UR STRIP: 5.5 [PH] (ref 5–8)
PLATELET # BLD AUTO: 122 K/UL (ref 150–400)
POTASSIUM SERPL-SCNC: 3.3 MMOL/L (ref 3.5–5.1)
POTASSIUM SERPL-SCNC: 3.6 MMOL/L (ref 3.5–5.1)
PROT SERPL-MCNC: 7.5 G/DL (ref 6.4–8.2)
PROT SERPL-MCNC: 9.1 G/DL (ref 6.4–8.2)
PROT UR STRIP-MCNC: 100 MG/DL
Q-T INTERVAL, ECG07: 298 MS
QRS DURATION, ECG06: 86 MS
QTC CALCULATION (BEZET), ECG08: 430 MS
RBC # BLD AUTO: 3.67 M/UL (ref 4.1–5.7)
RBC #/AREA URNS HPF: ABNORMAL /HPF (ref 0–5)
RBC MORPH BLD: ABNORMAL
SODIUM SERPL-SCNC: 135 MMOL/L (ref 136–145)
SODIUM SERPL-SCNC: 137 MMOL/L (ref 136–145)
SP GR UR REFRACTOMETRY: 1.02 (ref 1–1.03)
UA: UC IF INDICATED,UAUC: ABNORMAL
UROBILINOGEN UR QL STRIP.AUTO: 0.2 EU/DL (ref 0.2–1)
VENTRICULAR RATE, ECG03: 125 BPM
WBC # BLD AUTO: 3.6 K/UL (ref 4.1–11.1)
WBC URNS QL MICRO: ABNORMAL /HPF (ref 0–4)

## 2017-10-30 PROCEDURE — 83605 ASSAY OF LACTIC ACID: CPT | Performed by: EMERGENCY MEDICINE

## 2017-10-30 PROCEDURE — 80307 DRUG TEST PRSMV CHEM ANLYZR: CPT | Performed by: EMERGENCY MEDICINE

## 2017-10-30 PROCEDURE — 65660000000 HC RM CCU STEPDOWN

## 2017-10-30 PROCEDURE — 77010033678 HC OXYGEN DAILY

## 2017-10-30 PROCEDURE — 74011250636 HC RX REV CODE- 250/636: Performed by: EMERGENCY MEDICINE

## 2017-10-30 PROCEDURE — 93005 ELECTROCARDIOGRAM TRACING: CPT

## 2017-10-30 PROCEDURE — 85025 COMPLETE CBC W/AUTO DIFF WBC: CPT | Performed by: EMERGENCY MEDICINE

## 2017-10-30 PROCEDURE — 96365 THER/PROPH/DIAG IV INF INIT: CPT

## 2017-10-30 PROCEDURE — 83735 ASSAY OF MAGNESIUM: CPT | Performed by: INTERNAL MEDICINE

## 2017-10-30 PROCEDURE — 74011250637 HC RX REV CODE- 250/637: Performed by: INTERNAL MEDICINE

## 2017-10-30 PROCEDURE — 74011000258 HC RX REV CODE- 258: Performed by: INTERNAL MEDICINE

## 2017-10-30 PROCEDURE — 74011250636 HC RX REV CODE- 250/636

## 2017-10-30 PROCEDURE — 36415 COLL VENOUS BLD VENIPUNCTURE: CPT | Performed by: EMERGENCY MEDICINE

## 2017-10-30 PROCEDURE — 80053 COMPREHEN METABOLIC PANEL: CPT | Performed by: INTERNAL MEDICINE

## 2017-10-30 PROCEDURE — 96375 TX/PRO/DX INJ NEW DRUG ADDON: CPT

## 2017-10-30 PROCEDURE — 74011250636 HC RX REV CODE- 250/636: Performed by: INTERNAL MEDICINE

## 2017-10-30 PROCEDURE — 80053 COMPREHEN METABOLIC PANEL: CPT | Performed by: EMERGENCY MEDICINE

## 2017-10-30 PROCEDURE — 96376 TX/PRO/DX INJ SAME DRUG ADON: CPT

## 2017-10-30 PROCEDURE — 81001 URINALYSIS AUTO W/SCOPE: CPT | Performed by: EMERGENCY MEDICINE

## 2017-10-30 PROCEDURE — 95816 EEG AWAKE AND DROWSY: CPT | Performed by: INTERNAL MEDICINE

## 2017-10-30 PROCEDURE — 74011000250 HC RX REV CODE- 250: Performed by: EMERGENCY MEDICINE

## 2017-10-30 RX ORDER — MAGNESIUM SULFATE HEPTAHYDRATE 40 MG/ML
2 INJECTION, SOLUTION INTRAVENOUS ONCE
Status: COMPLETED | OUTPATIENT
Start: 2017-10-30 | End: 2017-11-01

## 2017-10-30 RX ORDER — LORAZEPAM 2 MG/ML
2 INJECTION INTRAMUSCULAR
Status: DISPENSED | OUTPATIENT
Start: 2017-10-30 | End: 2017-10-30

## 2017-10-30 RX ORDER — POTASSIUM CHLORIDE 20 MEQ/1
40 TABLET, EXTENDED RELEASE ORAL
Status: COMPLETED | OUTPATIENT
Start: 2017-10-30 | End: 2017-10-30

## 2017-10-30 RX ORDER — CLONIDINE HYDROCHLORIDE 0.1 MG/1
0.1 TABLET ORAL
Status: DISCONTINUED | OUTPATIENT
Start: 2017-10-30 | End: 2017-11-03 | Stop reason: HOSPADM

## 2017-10-30 RX ORDER — HYDROCHLOROTHIAZIDE 25 MG/1
12.5 TABLET ORAL DAILY
Status: DISCONTINUED | OUTPATIENT
Start: 2017-10-31 | End: 2017-11-03 | Stop reason: HOSPADM

## 2017-10-30 RX ORDER — LORAZEPAM 2 MG/ML
2 INJECTION INTRAMUSCULAR
Status: DISCONTINUED | OUTPATIENT
Start: 2017-10-30 | End: 2017-10-31

## 2017-10-30 RX ORDER — LORAZEPAM 2 MG/ML
1-2 INJECTION INTRAMUSCULAR
Status: DISCONTINUED | OUTPATIENT
Start: 2017-10-30 | End: 2017-10-30

## 2017-10-30 RX ORDER — DEXTROSE MONOHYDRATE AND SODIUM CHLORIDE 5; .9 G/100ML; G/100ML
75 INJECTION, SOLUTION INTRAVENOUS CONTINUOUS
Status: DISCONTINUED | OUTPATIENT
Start: 2017-10-30 | End: 2017-10-31

## 2017-10-30 RX ORDER — CHLORDIAZEPOXIDE HYDROCHLORIDE 5 MG/1
5 CAPSULE, GELATIN COATED ORAL 3 TIMES DAILY
Status: DISCONTINUED | OUTPATIENT
Start: 2017-10-30 | End: 2017-10-31

## 2017-10-30 RX ORDER — LORAZEPAM 2 MG/ML
INJECTION INTRAMUSCULAR
Status: COMPLETED
Start: 2017-10-30 | End: 2017-10-30

## 2017-10-30 RX ORDER — LORAZEPAM 2 MG/ML
1 INJECTION INTRAMUSCULAR
Status: DISCONTINUED | OUTPATIENT
Start: 2017-10-30 | End: 2017-10-30

## 2017-10-30 RX ADMIN — DEXTROSE MONOHYDRATE AND SODIUM CHLORIDE 125 ML/HR: 5; .9 INJECTION, SOLUTION INTRAVENOUS at 04:59

## 2017-10-30 RX ADMIN — CHLORDIAZEPOXIDE HYDROCHLORIDE 5 MG: 5 CAPSULE ORAL at 15:15

## 2017-10-30 RX ADMIN — LORAZEPAM 2 MG: 2 INJECTION INTRAMUSCULAR; INTRAVENOUS at 13:22

## 2017-10-30 RX ADMIN — LORAZEPAM 2 MG: 2 INJECTION INTRAMUSCULAR; INTRAVENOUS at 16:59

## 2017-10-30 RX ADMIN — FOLIC ACID: 5 INJECTION, SOLUTION INTRAMUSCULAR; INTRAVENOUS; SUBCUTANEOUS at 01:14

## 2017-10-30 RX ADMIN — LORAZEPAM 2 MG: 2 INJECTION INTRAMUSCULAR; INTRAVENOUS at 22:13

## 2017-10-30 RX ADMIN — LORAZEPAM 2 MG: 2 INJECTION INTRAMUSCULAR; INTRAVENOUS at 04:54

## 2017-10-30 RX ADMIN — POTASSIUM CHLORIDE 40 MEQ: 20 TABLET, EXTENDED RELEASE ORAL at 11:14

## 2017-10-30 RX ADMIN — LORAZEPAM 2 MG: 2 INJECTION INTRAMUSCULAR; INTRAVENOUS at 23:31

## 2017-10-30 RX ADMIN — LORAZEPAM 2 MG: 2 INJECTION INTRAMUSCULAR; INTRAVENOUS at 21:20

## 2017-10-30 RX ADMIN — LORAZEPAM 1 MG: 2 INJECTION INTRAMUSCULAR; INTRAVENOUS at 02:42

## 2017-10-30 RX ADMIN — Medication 10 ML: at 13:26

## 2017-10-30 RX ADMIN — DEXTROSE MONOHYDRATE AND SODIUM CHLORIDE 125 ML/HR: 5; .9 INJECTION, SOLUTION INTRAVENOUS at 13:22

## 2017-10-30 RX ADMIN — LORAZEPAM 2 MG: 2 INJECTION INTRAMUSCULAR; INTRAVENOUS at 09:31

## 2017-10-30 RX ADMIN — LORAZEPAM 1 MG: 2 INJECTION INTRAMUSCULAR; INTRAVENOUS at 01:21

## 2017-10-30 RX ADMIN — LORAZEPAM 1 MG: 2 INJECTION INTRAMUSCULAR; INTRAVENOUS at 03:51

## 2017-10-30 RX ADMIN — MAGNESIUM SULFATE HEPTAHYDRATE 2 G: 40 INJECTION, SOLUTION INTRAVENOUS at 04:22

## 2017-10-30 RX ADMIN — CLONIDINE HYDROCHLORIDE 0.1 MG: 0.1 TABLET ORAL at 21:01

## 2017-10-30 RX ADMIN — POTASSIUM CHLORIDE 40 MEQ: 20 TABLET, EXTENDED RELEASE ORAL at 14:45

## 2017-10-30 RX ADMIN — SODIUM CHLORIDE 2000 ML: 900 INJECTION, SOLUTION INTRAVENOUS at 01:05

## 2017-10-30 RX ADMIN — LORAZEPAM 2 MG: 2 INJECTION INTRAMUSCULAR; INTRAVENOUS at 04:21

## 2017-10-30 RX ADMIN — LORAZEPAM 2 MG: 2 INJECTION INTRAMUSCULAR; INTRAVENOUS at 20:15

## 2017-10-30 RX ADMIN — Medication 5 ML: at 21:01

## 2017-10-30 RX ADMIN — CHLORDIAZEPOXIDE HYDROCHLORIDE 5 MG: 5 CAPSULE ORAL at 21:01

## 2017-10-30 RX ADMIN — LORAZEPAM 1 MG: 2 INJECTION INTRAMUSCULAR; INTRAVENOUS at 00:20

## 2017-10-30 NOTE — ED NOTES
TRANSFER - OUT REPORT:    Verbal report given to Larissa Dupont RN (name) on Peggy Quijano  being transferred to PCU (unit) for routine progression of care       Report consisted of patients Situation, Background, Assessment and   Recommendations(SBAR). Information from the following report(s) SBAR, Kardex, ED Summary, MAR, Accordion, Recent Results, Med Rec Status and Cardiac Rhythm Sinus Tach was reviewed with the receiving nurse. Lines:   Peripheral IV 10/30/17 Right Arm (Active)   Site Assessment Clean, dry, & intact 10/30/2017  1:43 AM   Phlebitis Assessment 0 10/30/2017  1:43 AM   Infiltration Assessment 0 10/30/2017  1:43 AM   Dressing Status Clean, dry, & intact 10/30/2017  1:43 AM   Dressing Type Transparent;Tape 10/30/2017  1:43 AM   Hub Color/Line Status Patent; Flushed;Pink 10/30/2017  1:43 AM   Action Taken Blood drawn 10/30/2017  1:43 AM        Opportunity for questions and clarification was provided.       Patient transported with:  O2 at 2L/min   Registered Nurse

## 2017-10-30 NOTE — ED NOTES
Patient continues to be tremulous and visibly anxious. Ativan administered. Will continue to monitor neuro status and vital signs closely.

## 2017-10-30 NOTE — ED PROVIDER NOTES
[address]  EMERGENCY DEPARTMENT HISTORY AND PHYSICAL EXAM       Date of Service: 10/29/2017   Patient Name: Do Salamanca   YOB: 1955  Medical Record Number: [id number]    History of Presenting Illness     Chief Complaint   Patient presents with    Alcohol Problem        History Provided By:  patient and spouse    Additional History:   Do Salamanca is a 58 y.o. male, pmhx HTN, seizures, hernia, alcohol abuse, who presents via EMS to the ED c/o subjective alcohol withdrawal x today. Per EMS, the pt's wife called EMS for the pt's excessive drinking recently and intermittent vomiting. Pt states his last drink was yesterday morning. Pt is currently tremulous and currently denying any alcohol use. He denies any daily medications, chest pain or SOB. Given pt is currently asymptomatic and without complaint there is no applicable location, quality, duration, severity, timing, context, associated sxs, additional context, or modifying factors. PCP: Rasheed Marie MD    PMHx: Significant for HTN, seizures, hernia, alcohol abuse  PSHx: Significant for hernia repair  Social Hx: -tobacco, +EtOH, -Illicit Drugs     There are no other complaints, changes, or physical findings at this time.      Primary Care Provider: Rasheed Marie MD       Past History     Past Medical History:   Past Medical History:   Diagnosis Date    Alcoholism ([facility] [address]     Hypertension     Ill-defined condition     Hernia     Seizures ([facility] [address]     Withdrawal         Past Surgical History:   Past Surgical History:   Procedure Laterality Date    HX HERNIA REPAIR          Family History:   Family History   Problem Relation Age of Onset    Hypertension Other     Breast Cancer Other         Social History:   Social History   Substance Use Topics    Smoking status: Never Smoker    Smokeless tobacco: Never Used    Alcohol use Yes      Comment: 1/2-1 pint vodka/day +/- beer        Allergies: Allergies   Allergen Reactions    Lisinopril Angioedema         Review of Systems   Review of Systems   Constitutional: Negative. Negative for chills and fever. HENT: Negative. Negative for congestion, facial swelling, rhinorrhea, sore throat, trouble swallowing and voice change. Respiratory: Negative. Negative for apnea, cough, chest tightness and shortness of breath. Cardiovascular: Negative for chest pain, palpitations and leg swelling. Gastrointestinal: Positive for vomiting. Negative for abdominal distention, abdominal pain, constipation, diarrhea and nausea. Genitourinary: Negative. Negative for difficulty urinating, dysuria, frequency, hematuria and urgency. Musculoskeletal: Negative. Negative for arthralgias, back pain and myalgias. Skin: Negative for rash. Neurological: Positive for tremors. Negative for dizziness, facial asymmetry, weakness, light-headedness, numbness and headaches. Psychiatric/Behavioral: Negative. Physical Exam  Physical Exam   Constitutional: He is oriented to person, place, and time. Vital signs are normal. He appears well-developed and well-nourished. He is cooperative. Non-toxic appearance. No distress. HENT:   Head: Normocephalic and atraumatic. Mouth/Throat: Mucous membranes are dry. No posterior oropharyngeal erythema. Eyes: Conjunctivae and EOM are normal. Pupils are equal, round, and reactive to light. Neck: Normal range of motion. Cardiovascular: Regular rhythm, normal heart sounds and intact distal pulses. Tachycardia present. Exam reveals no gallop and no friction rub. No murmur heard. Pulmonary/Chest: Effort normal and breath sounds normal. No respiratory distress. He has no wheezes. He has no rales. He exhibits no tenderness. Abdominal: Soft. Bowel sounds are normal. He exhibits no distension and no mass. There is no tenderness. There is no rebound and no guarding. A hernia (umbilical) is present.    Musculoskeletal: Normal range of motion. He exhibits no edema, tenderness or deformity. Neurological: He is alert and oriented to person, place, and time. He displays normal reflexes. No cranial nerve deficit. He exhibits normal muscle tone. Coordination normal.   Skin: Skin is warm. No rash noted. Psychiatric: He has a normal mood and affect. Nursing note and vitals reviewed. Medical Decision Making   I am the first provider for this patient. I reviewed the vital signs, available nursing notes, past medical history, past surgical history, family history and social history. Old Medical Records: Old medical records. Nursing notes. Provider Notes:   DDx: Alcohol withdrawal, electrolyte abnormality, sepsis     Plan: 58year old presents with concern for withdrawal. Tachycardic and tachypnea. Will give IV fluids and Benzos. Check labs and likely admission. ED Course:  4:13 AM   Initial assessment performed. The patients presenting problems have been discussed, and they are in agreement with the care plan formulated and outlined with them. I have encouraged them to ask questions as they arise throughout their visit. Progress Notes:     Progress Note:  1:05 AM  Lactate 16.5. Progress Note:  4:13 AM  Pt sleeping comfortably. Tachycardia improved to 110. Not found in respiratory distress. Pt received 2 L of IV fluids and completed goody bag. Will repeat lactate.         Diagnostic Study Results     Labs -      Recent Results (from the past 12 hour(s))   EKG, 12 LEAD, INITIAL    Collection Time: 10/30/17 12:03 AM   Result Value Ref Range    Ventricular Rate 125 BPM    Atrial Rate 125 BPM    P-R Interval 164 ms    QRS Duration 86 ms    Q-T Interval 298 ms    QTC Calculation (Bezet) 430 ms    Calculated P Axis 30 degrees    Calculated R Axis 20 degrees    Calculated T Axis -52 degrees    Diagnosis       Sinus tachycardia  Inferior infarct , age undetermined  ST & T wave abnormality, consider lateral ischemia  Abnormal ECG  When compared with ECG of 09-JUL-2017 09:08,  Inferior infarct is now present  T wave inversion now evident in Inferior leads  T wave inversion now evident in Lateral leads     ETHYL ALCOHOL    Collection Time: 10/30/17 12:10 AM   Result Value Ref Range    ALCOHOL(ETHYL),SERUM 57 (H) <71 MG/DL   METABOLIC PANEL, COMPREHENSIVE    Collection Time: 10/30/17 12:10 AM   Result Value Ref Range    Sodium 135 (L) 136 - 145 mmol/L    Potassium 3.6 3.5 - 5.1 mmol/L    Chloride 94 (L) 97 - 108 mmol/L    CO2 16 (L) 21 - 32 mmol/L    Anion gap 25 (H) 5 - 15 mmol/L    Glucose 110 (H) 65 - 100 mg/dL    BUN 3 (L) 6 - 20 MG/DL    Creatinine 1.10 0.70 - 1.30 MG/DL    BUN/Creatinine ratio 3 (L) 12 - 20      GFR est AA >60 >60 ml/min/1.73m2    GFR est non-AA >60 >60 ml/min/1.73m2    Calcium 9.5 8.5 - 10.1 MG/DL    Bilirubin, total 1.8 (H) 0.2 - 1.0 MG/DL    ALT (SGPT) 68 12 - 78 U/L    AST (SGOT) 122 (H) 15 - 37 U/L    Alk.  phosphatase 72 45 - 117 U/L    Protein, total 9.1 (H) 6.4 - 8.2 g/dL    Albumin 4.4 3.5 - 5.0 g/dL    Globulin 4.7 (H) 2.0 - 4.0 g/dL    A-G Ratio 0.9 (L) 1.1 - 2.2     LACTIC ACID    Collection Time: 10/30/17 12:10 AM   Result Value Ref Range    Lactic acid 16.5 (HH) 0.4 - 2.0 MMOL/L   URINALYSIS W/ REFLEX CULTURE    Collection Time: 10/30/17 12:23 AM   Result Value Ref Range    Color YELLOW/STRAW      Appearance CLEAR CLEAR      Specific gravity 1.017 1.003 - 1.030      pH (UA) 5.5 5.0 - 8.0      Protein 100 (A) NEG mg/dL    Glucose NEGATIVE  NEG mg/dL    Ketone TRACE (A) NEG mg/dL    Bilirubin NEGATIVE  NEG      Blood LARGE (A) NEG      Urobilinogen 0.2 0.2 - 1.0 EU/dL    Nitrites NEGATIVE  NEG      Leukocyte Esterase NEGATIVE  NEG      WBC 0-4 0 - 4 /hpf    RBC 5-10 0 - 5 /hpf    Epithelial cells FEW FEW /lpf    Bacteria NEGATIVE  NEG /hpf    UA:UC IF INDICATED CULTURE NOT INDICATED BY UA RESULT CNI      Hyaline cast 2-5 0 - 5 /lpf   DRUG SCREEN, URINE    Collection Time: 10/30/17 12:23 AM   Result Value Ref Range    AMPHETAMINES NEGATIVE  NEG      BARBITURATES NEGATIVE  NEG      BENZODIAZEPINES POSITIVE (A) NEG      COCAINE NEGATIVE  NEG      METHADONE NEGATIVE  NEG      OPIATES NEGATIVE  NEG      PCP(PHENCYCLIDINE) NEGATIVE  NEG      THC (TH-CANNABINOL) NEGATIVE  NEG      Drug screen comment (NOTE)    CBC WITH AUTOMATED DIFF    Collection Time: 10/30/17  1:07 AM   Result Value Ref Range    WBC 3.6 (L) 4.1 - 11.1 K/uL    RBC 3.67 (L) 4.10 - 5.70 M/uL    HGB 12.9 12.1 - 17.0 g/dL    HCT 37.5 36.6 - 50.3 %    .2 (H) 80.0 - 99.0 FL    MCH 35.1 (H) 26.0 - 34.0 PG    MCHC 34.4 30.0 - 36.5 g/dL    RDW 12.8 11.5 - 14.5 %    PLATELET 995 (L) 880 - 400 K/uL    NEUTROPHILS 84 (H) 32 - 75 %    LYMPHOCYTES 6 (L) 12 - 49 %    MONOCYTES 9 5 - 13 %    EOSINOPHILS 0 0 - 7 %    BASOPHILS 1 0 - 1 %    ABS. NEUTROPHILS 3.1 1.8 - 8.0 K/UL    ABS. LYMPHOCYTES 0.2 (L) 0.8 - 3.5 K/UL    ABS. MONOCYTES 0.3 0.0 - 1.0 K/UL    ABS. EOSINOPHILS 0.0 0.0 - 0.4 K/UL    ABS. BASOPHILS 0.0 0.0 - 0.1 K/UL    RBC COMMENTS MACROCYTOSIS  1+        DF SMEAR SCANNED     LACTIC ACID    Collection Time: 10/30/17  3:54 AM   Result Value Ref Range    Lactic acid 3.7 (HH) 0.4 - 2.0 MMOL/L       Vital Signs-Reviewed the patient's vital signs.    Patient Vitals for the past 12 hrs:   Temp Pulse Resp BP SpO2   10/30/17 0558 - 90 29 - 96 %   10/30/17 0545 - (!) 102 29 (!) 140/91 92 %   10/30/17 0530 - 99 26 (!) 146/96 95 %   10/30/17 0518 - 100 (!) 33 - 96 %   10/30/17 0515 - (!) 101 30 145/87 93 %   10/30/17 0500 - (!) 103 25 (!) 154/91 96 %   10/30/17 0445 - (!) 108 21 (!) 153/102 97 %   10/30/17 0430 - (!) 102 (!) 31 (!) 147/98 96 %   10/30/17 0330 - (!) 109 (!) 36 (!) 154/106 97 %   10/30/17 0315 - - - (!) 137/115 94 %   10/30/17 0300 - (!) 101 (!) 35 (!) 152/94 97 %   10/30/17 0245 - (!) 110 (!) 32 (!) 168/129 97 %   10/30/17 0215 - (!) 104 (!) 31 (!) 174/102 96 %   10/30/17 0200 - (!) 103 27 (!) 168/96 97 %   10/30/17 0145 - 100 (!) 31 (!) 169/108 97 %   10/30/17 0130 - (!) 104 24 (!) 152/133 96 %   10/30/17 0125 - (!) 112 26 (!) 153/114 95 %   10/30/17 0015 - (!) 130 20 (!) 189/136 96 %   10/30/17 0010 - (!) 127 (!) 39 (!) 155/107 96 %   10/29/17 2356 99.2 °F (37.3 °C) (!) 128 (!) 34 - 96 %       Medications Given in the ED:  Medications   sodium chloride (NS) flush 5-10 mL (not administered)   sodium chloride (NS) flush 5-10 mL (not administered)   LORazepam (ATIVAN) injection 2 mg (0 mg IntraVENous Held 10/30/17 0501)   dextrose 5% and 0.9% NaCl infusion (125 mL/hr IntraVENous New Bag 10/30/17 0459)   0.9% sodium chloride 4,882 mL with folic acid 1 mg, thiamine 100 mg, mvi, adult no. 4 with vit K 10 mL infusion (not administered)   prochlorperazine (COMPAZINE) with saline injection 10 mg (not administered)   LORazepam (ATIVAN) injection 2 mg (not administered)   sodium chloride 0.9 % bolus infusion 2,000 mL (0 mL IntraVENous IV Completed 10/30/17 0230)   LORazepam (ATIVAN) injection 1 mg (1 mg IntraVENous Given 10/30/17 0020)   0.9% sodium chloride 1,000 mL with mvi, adult no. 4 with vit K 10 mL, thiamine 893 mg, folic acid 1 mg infusion ( IntraVENous IV Completed 10/30/17 0230)   magnesium sulfate 2 g/50 ml IVPB (premix or compounded) (2 g IntraVENous New Bag 10/30/17 0422)       Pulse Oximetry Analysis - Normal 97% on RA     Cardiac Monitor:   Rate: 126  Rhythm: Sinus Tachycardia          Diagnosis   Clinical Impression:   1. Alcoholic intoxication with complication ([address]    2. Lactate blood increased    3. Alcohol withdrawal syndrome without complication ([address]    4. Lactic acidosis    5. Accelerated hypertension         Disposition Note:  PLAN:  1. Admit to Hospitalist.    Admit Note:  1:15 AM  Pt is being admitted by Dr. Neldon Day. The results of their tests and reason(s) for their admission have been discussed with pt and/or available family.  They convey agreement and understanding for the need to be admitted and for admission diagnosis. _______________________________   Attestations: This note is prepared by Reese Toure, acting as Scribe for Shirley Chew MD      The scribe's documentation has been prepared under my direction and personally reviewed by me in its entirety. I confirm that the note above accurately reflects all work, treatment, procedures, and medical decision making performed by me. Shirley Chew MD        _______________________________           This note will not be viewable in [address]

## 2017-10-30 NOTE — ED NOTES
Hospitalist consulted concerning patient's BP. Hospitalist instructed RN to use Ativan as method to lower BP. RN monitoring BPs closely and will administered Ativan and ordered.

## 2017-10-30 NOTE — ED NOTES
Blood drawn from IV placed upon arrival. IV infiltrated. IV fluids have been ordered, IV access attempted by multiple nurses. MD aware.

## 2017-10-30 NOTE — ED NOTES
Assumed care of patient. Patient arrives via EMS from home. Wife states to EMS that patient has been drinking excessively recently, with liquor bottles throughout the house. Patient states his last drink was yesterday morning, and EMS reports his only alcohol consumption yesterday was a 40 ounce of malt liquor. Patient continues to deny excessive alcohol use, but states he has had seizures in the past due to withdrawal. Patient is tremulous upon arrival, slightly confused and unable to state the current year and his birth year. Patient placed on seizure precautions, monitor x3, call bell within reach.

## 2017-10-30 NOTE — PROGRESS NOTES
Hospitalist Progress Note    NAME: Katalina Boo   :  1955   MRN:  [id number]       Assessment / Plan:  Alcohol withdrawal  POA  Chronic Heavy Alcoholism  Fatty Liver/elevated LFTs POA due to above  Lactic Acidosis, suspect has had Seizure POA  HTN as part of DT  -CT head neg  -TBili 1.8 ,, Alcohol level 57 on admission  -lactic acidosis improving on IVF  -start librium and continue CIWA protocol  -cont' goody bad x 3 days  -seizure/aspiration precaution  -obtain EEG  -PT/OT/CM consult     Accelerated HTN  H/o CVA  -will use clonidine prn for BP and agitation due to DT  -on  HCTZ at home          Code Status: Full  Surrogate Decision Maker: wife Bel Melendez # [phone]      DVT Prophylaxis: Sq lovenox  GI Prophylaxis: IV pepcid    Baseline: Pt is independent at home with wife     Subjective:     Chief Complaint / Reason for Physician Visit  Pt seen at bedside, appears tremulous, intermittent confused. Discussed with RN events overnight. Review of Systems:  Symptom Y/N Comments  Symptom Y/N Comments   Fever/Chills    Chest Pain     Poor Appetite    Edema     Cough    Abdominal Pain     Sputum    Joint Pain     SOB/AMEZQUITA    Pruritis/Rash     Nausea/vomit    Tolerating PT/OT     Diarrhea    Tolerating Diet     Constipation    Other       Could NOT obtain due to: confused     Objective:     VITALS:   Last 24hrs VS reviewed since prior progress note.  Most recent are:  Patient Vitals for the past 24 hrs:   Temp Pulse Resp BP SpO2   10/30/17 0718 98.1 °F (36.7 °C) 88 20 (!) 156/107 97 %   10/30/17 0625 98.4 °F (36.9 °C) 86 18 (!) 157/101 -   10/30/17 0558 - 90 29 - 96 %   10/30/17 0545 - (!) 102 29 (!) 140/91 92 %   10/30/17 0530 - 99 26 (!) 146/96 95 %   10/30/17 0518 - 100 (!) 33 - 96 %   10/30/17 0515 - (!) 101 30 145/87 93 %   10/30/17 0500 - (!) 103 25 (!) 154/91 96 %   10/30/17 0445 - (!) 108 21 (!) 153/102 97 %   10/30/17 0430 - (!) 102 (!) 31 (!) 147/98 96 %   10/30/17 0330 - (!) 109 (!) 36 (!) 154/106 97 %   10/30/17 0315 - - - (!) 137/115 94 %   10/30/17 0300 - (!) 101 (!) 35 (!) 152/94 97 %   10/30/17 0245 - (!) 110 (!) 32 (!) 168/129 97 %   10/30/17 0215 - (!) 104 (!) 31 (!) 174/102 96 %   10/30/17 0200 - (!) 103 27 (!) 168/96 97 %   10/30/17 0145 - 100 (!) 31 (!) 169/108 97 %   10/30/17 0130 - (!) 104 24 (!) 152/133 96 %   10/30/17 0125 - (!) 112 26 (!) 153/114 95 %   10/30/17 0015 - (!) 130 20 (!) 189/136 96 %   10/30/17 0010 - (!) 127 (!) 39 (!) 155/107 96 %   10/29/17 2356 99.2 °F (37.3 °C) (!) 128 (!) 34 - 96 %       Intake/Output Summary (Last 24 hours) at 10/30/17 1353  Last data filed at 10/30/17 1114   Gross per 24 hour   Intake              120 ml   Output              825 ml   Net             -705 ml        PHYSICAL EXAM:  General: Pt in bed, appears to be confused, tremulous   EENT:  EOMI. Anicteric sclerae. MMM  Resp:  CTA bilaterally, no wheezing or rales. No accessory muscle use  CV:  Tachycardic,  No edema  GI:  Soft, Non distended, Non tender.  +Bowel sounds  Neurologic:  AAOx2, conversant but confused at times  Psych:   Not anxious nor agitated  Skin:  No rashes. No jaundice    Reviewed most current lab test results and cultures  YES  Reviewed most current radiology test results   YES  Review and summation of old records today    NO  Reviewed patient's current orders and MAR    YES  PMH/SH reviewed - no change compared to H&P  ________________________________________________________________________  Care Plan discussed with:    Comments   Patient x    Family      RN x    Care Manager     Consultant                        Multidiciplinary team rounds were held today with , nursing, pharmacist and clinical coordinator. Patient's plan of care was discussed; medications were reviewed and discharge planning was addressed.      ________________________________________________________________________  Total NON critical care TIME:  35   Minutes    Total CRITICAL CARE TIME Spent: Minutes non procedure based      Comments   >50% of visit spent in counseling and coordination of care     ________________________________________________________________________  Afia Kyle MD     Procedures: see electronic medical records for all procedures/Xrays and details which were not copied into this note but were reviewed prior to creation of Plan. LABS:  I reviewed today's most current labs and imaging studies.   Pertinent labs include:  Recent Labs      10/30/17   0107   WBC  3.6*   HGB  12.9   HCT  37.5   PLT  122*     Recent Labs      10/30/17   0757  10/30/17   0010   NA  137  135*   K  3.3*  3.6   CL  102  94*   CO2  27  16*   GLU  101*  110*   BUN  3*  3*   CREA  0.68*  1.10   CA  8.2*  9.5   MG  2.2   --    ALB  3.6  4.4   TBILI  2.3*  1.8*   SGOT  89*  122*   ALT  53  68       Signed: Afia Kyle MD

## 2017-10-30 NOTE — PROGRESS NOTES
PCU SHIFT NURSING NOTE      Bedside and Verbal shift change report given to Andrés Hines RN  (oncoming nurse) by ER Nurse Alvino Florez RN  (offgoing nurse). Report included the following information SBAR, ED Summary, MAR, Recent Results, Med Rec Status, Cardiac Rhythm Sinus Tach and Alarm Parameters . Shift Summary: Pt transferred to bed. Pt sedated. Difficult to answer admit assessment questions. D5NS @ 125 ml/hr to 20g HL R FA. Next daily banana bag due tomorrow. Pt oriented to room and call bell pt denies pain. Will monitor. Bedside and Verbal shift change report given to [address] (oncoming nurse) by Andrés Hines RN (offgoing nurse). Report included the following information SBAR, Kardex, Intake/Output, MAR, Recent Results, Med Rec Status, Cardiac Rhythm NSR to ST and Alarm Parameters . Admission Date 10/29/2017   Admission Diagnosis Delirium tremens ([facility] [address]   Consults IP CONSULT TO HOSPITALIST        Consults   []PT   []OT   []Speech   []Case Management      [] Palliative      Cardiac Monitoring Order   [x]Yes   []No     IV drips   [x]Yes    Drip:                            Dose:  Drip:                            Dose:  Drip:                            Dose:   []No     GI Prophylaxis   []Yes   []No         DVT Prophylaxis   SCDs:             Solis stockings:         [] Medication   []Contraindicated   []None      Activity Level           Purposeful Rounding every 1-2 hour? [x]Yes   Atkinson Score  Total Score: 3   Bed Alarm (If score 3 or >)   [x]Yes   [] Refused (See signed refusal form in chart)   Robinson Score      Robinson Score (if score 14 or less)   []PMT consult   []Wound Care consult      []Specialty bed   [] Nutrition consult          Needs prior to discharge:   Home O2 required:    []Yes   10-29-17[x]No    If yes, how much O2 required?     Other:    Last Bowel Movement:        Influenza Vaccine          Pneumonia Vaccine           Diet Active Orders   There are no active orders of the following type(s): Diet. LDAs               Peripheral IV 10/30/17 Right Arm (Active)   Site Assessment Clean, dry, & intact 10/30/2017  1:43 AM   Phlebitis Assessment 0 10/30/2017  1:43 AM   Infiltration Assessment 0 10/30/2017  1:43 AM   Dressing Status Clean, dry, & intact 10/30/2017  1:43 AM   Dressing Type Transparent;Tape 10/30/2017  1:43 AM   Hub Color/Line Status Patent; Flushed;Pink 10/30/2017  1:43 AM   Action Taken Blood drawn 10/30/2017  1:43 AM                      Urinary Catheter      Intake & Output        Readmission Risk Assessment Tool Score Medium Risk            17       Total Score        3 Has Seen PCP in Last 6 Months (Yes=3, No=0)    2 . Living with Significant Other. Assisted Living. LTAC. SNF. or   Rehab    4 IP Visits Last 12 Months (1-3=4, 4=9, >4=11)    8 Charlson Comorbidity Score (Age + Comorbid Conditions)        Criteria that do not apply:    Patient Length of Stay (>5 days = 3)    Pt.  Coverage (Medicare=5 , Medicaid, or Self-Pay=4)       Expected Length of Stay - - -   Actual Length of Stay 0

## 2017-10-30 NOTE — H&P
Hospitalist Admission Note    NAME: Aristeo Santillan   :  1955   MRN:  [id number]     Date/Time:  10/30/2017 3:04 AM    Patient PCP: Norma Dumont MD  ________________________________________________________________________    My assessment of this patient's clinical condition and my plan of care is as follows. Assessment / Plan:  Alcohol withdrawal  POA  Chronic Heavy Alcoholism  Fatty Liver/elevated LFTs POA due to above  Lactic Acidosis, suspect has had Seizure POA  HTN as part of DT  CT head neg  TBili= 1.8 AST  122,, Alcohol level 57     Admit to PCU bed  Load with IV Ativan 2mg x 3  prn as per the CIWA scoring protocol  IV ativan prn seizures only  Goody bag x 1 in ER, cont PO FA, B12, Thiamine, alternate with D5NS with K  MG 2 gms Now, then Follow levels  Seizure precautions, Follow Lactate  CM consult for - OP alcohol rehab options on discharge   Cessation counseling given in ER- pt seems to be motivated to quit this time     Accelerated HTN  H/o CVA   Use Ativan tp control BP as marker for Dt's   On  HCTZ at home        Code Status: Full  Surrogate Decision Maker: wife Marcus Lanes # [phone]     DVT Prophylaxis: Sq lovenox  GI Prophylaxis: IV pepcid     Baseline: Pt is independent at home with wife          Subjective:   CHIEF COMPLAINT: Brought in by wife due to Tremolousness    HISTORY OF PRESENT ILLNESS:     Aristeo Santillan is a 58 y.o.  male who presents brought in by wife concerned for impeding Seizures. He has known history of Heavy ETOH and was recently admitted to [facility]  In Sept for DT's. Wife states he has been excessively drinking with multiple alcohol bottes in the house. Today he is so tremolous so he was brought  To Ed where he was intoxicated, confused and unable to contribute to history    We were asked to admit for work up and evaluation of the above problems.      Past Medical History:   Diagnosis Date    Alcoholism ([address]     Hypertension     Ill-defined condition     Hernia     Seizures (HCC)     Withdrawal         Past Surgical History:   Procedure Laterality Date    HX HERNIA REPAIR         Social History   Substance Use Topics    Smoking status: Never Smoker    Smokeless tobacco: Never Used    Alcohol use Yes      Comment: 1/2-1 pint vodka/day +/- beer        Family History   Problem Relation Age of Onset    Hypertension Other     Breast Cancer Other      Allergies   Allergen Reactions    Lisinopril Angioedema        Prior to Admission medications    Medication Sig Start Date End Date Taking? Authorizing Provider   hydroCHLOROthiazide (HYDRODIURIL) 12.5 mg tablet Take 1 Tab by mouth daily. 7/16/17   Srinivasa Vazquez MD       REVIEW OF SYSTEMS:     I am not able to complete the review of systems because:    The patient is intubated and sedated   y The patient has altered mental status due to his acute medical problems    The patient has baseline aphasia from prior stroke(s)    The patient has baseline dementia and is not reliable historian    The patient is in acute medical distress and unable to provide information           Total of 12 systems reviewed as follows:       POSITIVE= underlined text  Negative = text not underlined  General:  fever, chills, sweats, generalized weakness, weight loss/gain,      loss of appetite   Eyes:    blurred vision, eye pain, loss of vision, double vision  ENT:    rhinorrhea, pharyngitis   Respiratory:   cough, sputum production, SOB, AMEZQUITA, wheezing, pleuritic pain   Cardiology:   chest pain, palpitations, orthopnea, PND, edema, syncope   Gastrointestinal:  abdominal pain , N/V, diarrhea, dysphagia, constipation, bleeding   Genitourinary:  frequency, urgency, dysuria, hematuria, incontinence   Muskuloskeletal :  arthralgia, myalgia, back pain  Hematology:  easy bruising, nose or gum bleeding, lymphadenopathy   Dermatological: rash, ulceration, pruritis, color change / jaundice  Endocrine:   hot flashes or polydipsia   Neurological:  headache, dizziness, confusion, focal weakness, paresthesia,     Speech difficulties, memory loss, gait difficulty  Psychological: Feelings of anxiety, depression, agitation    Objective:   VITALS:    Visit Vitals    BP (!) 174/102    Pulse (!) 104    Temp 99.2 °F (37.3 °C)    Resp (!) 31    Ht 5' 9\" (1.753 m)    Wt 77.1 kg (170 lb)    SpO2 96%    BMI 25.1 kg/m2       PHYSICAL EXAM:    General:    Alert, cooperative, no distress, appears stated age. HEENT: Atraumatic, anicteric sclerae, pink conjunctivae     No oral ulcers, mucosa moist, throat clear, dentition fair  Neck:  Supple, symmetrical,  thyroid: non tender  Lungs:   Clear to auscultation bilaterally. No Wheezing or Rhonchi. No rales. Chest wall:  No tenderness  No Accessory muscle use. Heart:   Regular  rhythm,  No  murmur   No edema  Abdomen:   Soft, non-tender. Not distended. Bowel sounds normal  Extremities: No cyanosis. No clubbing,      Skin turgor normal, Capillary refill normal, Radial dial pulse 2+  Skin:     Not pale. Not Jaundiced  No rashes   Psych:  Good insight. Not depressed. Not anxious or agitated. Neurologic: EOMs intact. No facial asymmetry. No aphasia or slurred speech. Symmetrical strength, Sensation grossly intact.  Alert and oriented X 4.     _______________________________________________________________________  Care Plan discussed with:    Comments   Patient y    Family      RN y    Care Manager                    Consultant:      _______________________________________________________________________  Expected  Disposition:   Home with Family y   HH/PT/OT/RN    SNF/LTC    CLAUDIO    ________________________________________________________________________  TOTAL TIME:  48 Minutes    Critical Care Provided     Minutes non procedure based      Comments    yes Reviewed previous records   >50% of visit spent in counseling and coordination of care  Discussion with patient and/or family and questions answered       ________________________________________________________________________  Signed: Jeanne Altamirano MD    Procedures: see electronic medical records for all procedures/Xrays and details which were not copied into this note but were reviewed prior to creation of Plan. LAB DATA REVIEWED:    Recent Results (from the past 24 hour(s))   EKG, 12 LEAD, INITIAL    Collection Time: 10/30/17 12:03 AM   Result Value Ref Range    Ventricular Rate 125 BPM    Atrial Rate 125 BPM    P-R Interval 164 ms    QRS Duration 86 ms    Q-T Interval 298 ms    QTC Calculation (Bezet) 430 ms    Calculated P Axis 30 degrees    Calculated R Axis 20 degrees    Calculated T Axis -52 degrees    Diagnosis       Sinus tachycardia  Inferior infarct , age undetermined  ST & T wave abnormality, consider lateral ischemia  Abnormal ECG  When compared with ECG of 09-JUL-2017 09:08,  Inferior infarct is now present  T wave inversion now evident in Inferior leads  T wave inversion now evident in Lateral leads     ETHYL ALCOHOL    Collection Time: 10/30/17 12:10 AM   Result Value Ref Range    ALCOHOL(ETHYL),SERUM 57 (H) <28 MG/DL   METABOLIC PANEL, COMPREHENSIVE    Collection Time: 10/30/17 12:10 AM   Result Value Ref Range    Sodium 135 (L) 136 - 145 mmol/L    Potassium 3.6 3.5 - 5.1 mmol/L    Chloride 94 (L) 97 - 108 mmol/L    CO2 16 (L) 21 - 32 mmol/L    Anion gap 25 (H) 5 - 15 mmol/L    Glucose 110 (H) 65 - 100 mg/dL    BUN 3 (L) 6 - 20 MG/DL    Creatinine 1.10 0.70 - 1.30 MG/DL    BUN/Creatinine ratio 3 (L) 12 - 20      GFR est AA >60 >60 ml/min/1.73m2    GFR est non-AA >60 >60 ml/min/1.73m2    Calcium 9.5 8.5 - 10.1 MG/DL    Bilirubin, total 1.8 (H) 0.2 - 1.0 MG/DL    ALT (SGPT) 68 12 - 78 U/L    AST (SGOT) 122 (H) 15 - 37 U/L    Alk.  phosphatase 72 45 - 117 U/L    Protein, total 9.1 (H) 6.4 - 8.2 g/dL    Albumin 4.4 3.5 - 5.0 g/dL    Globulin 4.7 (H) 2.0 - 4.0 g/dL    A-G Ratio 0.9 (L) 1.1 - 2.2     LACTIC ACID    Collection Time: 10/30/17 12:10 AM   Result Value Ref Range    Lactic acid 16.5 (HH) 0.4 - 2.0 MMOL/L   URINALYSIS W/ REFLEX CULTURE    Collection Time: 10/30/17 12:23 AM   Result Value Ref Range    Color YELLOW/STRAW      Appearance CLEAR CLEAR      Specific gravity 1.017 1.003 - 1.030      pH (UA) 5.5 5.0 - 8.0      Protein 100 (A) NEG mg/dL    Glucose NEGATIVE  NEG mg/dL    Ketone TRACE (A) NEG mg/dL    Bilirubin NEGATIVE  NEG      Blood LARGE (A) NEG      Urobilinogen 0.2 0.2 - 1.0 EU/dL    Nitrites NEGATIVE  NEG      Leukocyte Esterase NEGATIVE  NEG      WBC 0-4 0 - 4 /hpf    RBC 5-10 0 - 5 /hpf    Epithelial cells FEW FEW /lpf    Bacteria NEGATIVE  NEG /hpf    UA:UC IF INDICATED CULTURE NOT INDICATED BY UA RESULT CNI      Hyaline cast 2-5 0 - 5 /lpf   DRUG SCREEN, URINE    Collection Time: 10/30/17 12:23 AM   Result Value Ref Range    AMPHETAMINES NEGATIVE  NEG      BARBITURATES NEGATIVE  NEG      BENZODIAZEPINES POSITIVE (A) NEG      COCAINE NEGATIVE  NEG      METHADONE NEGATIVE  NEG      OPIATES NEGATIVE  NEG      PCP(PHENCYCLIDINE) NEGATIVE  NEG      THC (TH-CANNABINOL) NEGATIVE  NEG      Drug screen comment (NOTE)    CBC WITH AUTOMATED DIFF    Collection Time: 10/30/17  1:07 AM   Result Value Ref Range    WBC 3.6 (L) 4.1 - 11.1 K/uL    RBC 3.67 (L) 4.10 - 5.70 M/uL    HGB 12.9 12.1 - 17.0 g/dL    HCT 37.5 36.6 - 50.3 %    .2 (H) 80.0 - 99.0 FL    MCH 35.1 (H) 26.0 - 34.0 PG    MCHC 34.4 30.0 - 36.5 g/dL    RDW 12.8 11.5 - 14.5 %    PLATELET 546 (L) 095 - 400 K/uL    NEUTROPHILS 84 (H) 32 - 75 %    LYMPHOCYTES 6 (L) 12 - 49 %    MONOCYTES 9 5 - 13 %    EOSINOPHILS 0 0 - 7 %    BASOPHILS 1 0 - 1 %    ABS. NEUTROPHILS 3.1 1.8 - 8.0 K/UL    ABS. LYMPHOCYTES 0.2 (L) 0.8 - 3.5 K/UL    ABS. MONOCYTES 0.3 0.0 - 1.0 K/UL    ABS. EOSINOPHILS 0.0 0.0 - 0.4 K/UL    ABS.  BASOPHILS 0.0 0.0 - 0.1 K/UL    RBC COMMENTS MACROCYTOSIS  1+        DF SMEAR SCANNED

## 2017-10-31 LAB
ALBUMIN SERPL-MCNC: 3.6 G/DL (ref 3.5–5)
ALBUMIN/GLOB SERPL: 1.1 {RATIO} (ref 1.1–2.2)
ALP SERPL-CCNC: 52 U/L (ref 45–117)
ALT SERPL-CCNC: 39 U/L (ref 12–78)
ANION GAP SERPL CALC-SCNC: 8 MMOL/L (ref 5–15)
AST SERPL-CCNC: 58 U/L (ref 15–37)
BILIRUB SERPL-MCNC: 2.5 MG/DL (ref 0.2–1)
BUN SERPL-MCNC: 1 MG/DL (ref 6–20)
BUN/CREAT SERPL: 2 (ref 12–20)
CALCIUM SERPL-MCNC: 8.2 MG/DL (ref 8.5–10.1)
CHLORIDE SERPL-SCNC: 108 MMOL/L (ref 97–108)
CO2 SERPL-SCNC: 24 MMOL/L (ref 21–32)
CREAT SERPL-MCNC: 0.62 MG/DL (ref 0.7–1.3)
GLOBULIN SER CALC-MCNC: 3.2 G/DL (ref 2–4)
GLUCOSE SERPL-MCNC: 97 MG/DL (ref 65–100)
POTASSIUM SERPL-SCNC: 3.8 MMOL/L (ref 3.5–5.1)
PROT SERPL-MCNC: 6.8 G/DL (ref 6.4–8.2)
SODIUM SERPL-SCNC: 140 MMOL/L (ref 136–145)

## 2017-10-31 PROCEDURE — 36415 COLL VENOUS BLD VENIPUNCTURE: CPT | Performed by: INTERNAL MEDICINE

## 2017-10-31 PROCEDURE — 80053 COMPREHEN METABOLIC PANEL: CPT | Performed by: INTERNAL MEDICINE

## 2017-10-31 PROCEDURE — 74011250637 HC RX REV CODE- 250/637: Performed by: INTERNAL MEDICINE

## 2017-10-31 PROCEDURE — 97161 PT EVAL LOW COMPLEX 20 MIN: CPT

## 2017-10-31 PROCEDURE — G8979 MOBILITY GOAL STATUS: HCPCS

## 2017-10-31 PROCEDURE — 74011000250 HC RX REV CODE- 250: Performed by: INTERNAL MEDICINE

## 2017-10-31 PROCEDURE — 65620000000 HC RM CCU GENERAL

## 2017-10-31 PROCEDURE — G8978 MOBILITY CURRENT STATUS: HCPCS

## 2017-10-31 PROCEDURE — 74011250636 HC RX REV CODE- 250/636: Performed by: INTERNAL MEDICINE

## 2017-10-31 PROCEDURE — 74011000258 HC RX REV CODE- 258: Performed by: INTERNAL MEDICINE

## 2017-10-31 PROCEDURE — 97116 GAIT TRAINING THERAPY: CPT

## 2017-10-31 RX ORDER — DIAZEPAM 10 MG/2ML
10 INJECTION INTRAMUSCULAR ONCE
Status: DISCONTINUED | OUTPATIENT
Start: 2017-10-31 | End: 2017-10-31 | Stop reason: RX

## 2017-10-31 RX ORDER — SODIUM CHLORIDE 9 MG/ML
75 INJECTION, SOLUTION INTRAVENOUS CONTINUOUS
Status: DISCONTINUED | OUTPATIENT
Start: 2017-10-31 | End: 2017-11-01

## 2017-10-31 RX ORDER — HYDRALAZINE HYDROCHLORIDE 20 MG/ML
10 INJECTION INTRAMUSCULAR; INTRAVENOUS
Status: DISCONTINUED | OUTPATIENT
Start: 2017-10-31 | End: 2017-11-03 | Stop reason: HOSPADM

## 2017-10-31 RX ORDER — LORAZEPAM 2 MG/ML
2 INJECTION INTRAMUSCULAR
Status: COMPLETED | OUTPATIENT
Start: 2017-10-31 | End: 2017-10-31

## 2017-10-31 RX ORDER — MUPIROCIN 20 MG/G
OINTMENT TOPICAL 2 TIMES DAILY
Status: DISCONTINUED | OUTPATIENT
Start: 2017-11-01 | End: 2017-11-03 | Stop reason: HOSPADM

## 2017-10-31 RX ORDER — LORAZEPAM 2 MG/ML
2 INJECTION INTRAMUSCULAR
Status: DISCONTINUED | OUTPATIENT
Start: 2017-10-31 | End: 2017-11-03 | Stop reason: HOSPADM

## 2017-10-31 RX ORDER — DIAZEPAM 10 MG/2ML
10 INJECTION INTRAMUSCULAR
Status: DISCONTINUED | OUTPATIENT
Start: 2017-10-31 | End: 2017-10-31 | Stop reason: RX

## 2017-10-31 RX ORDER — LORAZEPAM 2 MG/ML
4 INJECTION INTRAMUSCULAR
Status: DISCONTINUED | OUTPATIENT
Start: 2017-10-31 | End: 2017-11-03 | Stop reason: HOSPADM

## 2017-10-31 RX ORDER — CHLORDIAZEPOXIDE HYDROCHLORIDE 25 MG/1
25 CAPSULE, GELATIN COATED ORAL 3 TIMES DAILY
Status: DISCONTINUED | OUTPATIENT
Start: 2017-10-31 | End: 2017-11-02

## 2017-10-31 RX ORDER — DIAZEPAM 10 MG/2ML
20 INJECTION INTRAMUSCULAR
Status: DISCONTINUED | OUTPATIENT
Start: 2017-10-31 | End: 2017-10-31 | Stop reason: RX

## 2017-10-31 RX ADMIN — LORAZEPAM 2 MG: 2 INJECTION INTRAMUSCULAR; INTRAVENOUS at 17:59

## 2017-10-31 RX ADMIN — LORAZEPAM 2 MG: 2 INJECTION INTRAMUSCULAR; INTRAVENOUS at 03:26

## 2017-10-31 RX ADMIN — LORAZEPAM 2 MG: 2 INJECTION INTRAMUSCULAR; INTRAVENOUS at 08:41

## 2017-10-31 RX ADMIN — LORAZEPAM 2 MG: 2 INJECTION INTRAMUSCULAR; INTRAVENOUS at 15:16

## 2017-10-31 RX ADMIN — SODIUM CHLORIDE 0.2 MCG/KG/HR: 900 INJECTION, SOLUTION INTRAVENOUS at 18:55

## 2017-10-31 RX ADMIN — LORAZEPAM 2 MG: 2 INJECTION INTRAMUSCULAR; INTRAVENOUS at 19:47

## 2017-10-31 RX ADMIN — LORAZEPAM 2 MG: 2 INJECTION INTRAMUSCULAR; INTRAVENOUS at 00:45

## 2017-10-31 RX ADMIN — LORAZEPAM 2 MG: 2 INJECTION INTRAMUSCULAR; INTRAVENOUS at 20:25

## 2017-10-31 RX ADMIN — Medication 10 ML: at 20:27

## 2017-10-31 RX ADMIN — Medication 10 ML: at 14:54

## 2017-10-31 RX ADMIN — Medication 5 ML: at 05:22

## 2017-10-31 RX ADMIN — CHLORDIAZEPOXIDE HYDROCHLORIDE 25 MG: 25 CAPSULE ORAL at 16:37

## 2017-10-31 RX ADMIN — LORAZEPAM 2 MG: 2 INJECTION INTRAMUSCULAR; INTRAVENOUS at 16:23

## 2017-10-31 RX ADMIN — LORAZEPAM 2 MG: 2 INJECTION INTRAMUSCULAR; INTRAVENOUS at 06:32

## 2017-10-31 RX ADMIN — FOLIC ACID: 5 INJECTION, SOLUTION INTRAMUSCULAR; INTRAVENOUS; SUBCUTANEOUS at 02:36

## 2017-10-31 RX ADMIN — HYDROCHLOROTHIAZIDE 12.5 MG: 25 TABLET ORAL at 08:47

## 2017-10-31 RX ADMIN — SODIUM CHLORIDE 75 ML/HR: 900 INJECTION, SOLUTION INTRAVENOUS at 18:55

## 2017-10-31 RX ADMIN — LORAZEPAM 2 MG: 2 INJECTION INTRAMUSCULAR; INTRAVENOUS at 02:36

## 2017-10-31 RX ADMIN — LORAZEPAM 2 MG: 2 INJECTION INTRAMUSCULAR; INTRAVENOUS at 05:21

## 2017-10-31 RX ADMIN — LORAZEPAM 2 MG: 2 INJECTION INTRAMUSCULAR; INTRAVENOUS at 13:23

## 2017-10-31 RX ADMIN — LORAZEPAM 2 MG: 2 INJECTION INTRAMUSCULAR; INTRAVENOUS at 04:20

## 2017-10-31 RX ADMIN — CHLORDIAZEPOXIDE HYDROCHLORIDE 5 MG: 5 CAPSULE ORAL at 16:23

## 2017-10-31 RX ADMIN — CHLORDIAZEPOXIDE HYDROCHLORIDE 5 MG: 5 CAPSULE ORAL at 08:51

## 2017-10-31 RX ADMIN — LORAZEPAM 2 MG: 2 INJECTION INTRAMUSCULAR; INTRAVENOUS at 10:35

## 2017-10-31 RX ADMIN — LORAZEPAM 2 MG: 2 INJECTION INTRAMUSCULAR; INTRAVENOUS at 18:49

## 2017-10-31 RX ADMIN — DEXTROSE MONOHYDRATE AND SODIUM CHLORIDE 75 ML/HR: 5; .9 INJECTION, SOLUTION INTRAVENOUS at 09:59

## 2017-10-31 NOTE — PROGRESS NOTES
Pt had been up with PT and needed ativan to participate. Pt is now back in bed and nursing is requesting to have pt rest as pt remains restless. Will defer eval today and have OT follow up tomorrow.

## 2017-10-31 NOTE — PROGRESS NOTES
PCU SHIFT NURSING NOTE      Bedside shift change report given to RICARDO Kovacs (oncoming nurse) by [employer]. (offgoing nurse). Report included the following information SBAR, Kardex and Cardiac Rhythm Sinus Tach. Shift Summary:   7050 Patient very agitated and excited. Telesitter in the room. Patient in and out of orientation. 0800 Patient continues to try to get out of the bed. Placed him back in the bed  0900 patient still trying to get out of bed several times. Requesting a sitter for patients safety. 0930 I fed patient his breakfast and drink. He was able to eat at least 60% of breakfast.  1000 Patient is still trembling and agitatad, but very cooperative. 1100 Patient beginning to rest off and on but having tremors as he rest.  1115 Patient worked with PT and walked down the humphreys with walker. 1200 Patient sleeping   1300 patient still resting   1330 Patient woke up from a dream screaming and trembling. Patient very anxious and disoriented to place. Gave patient Ativan and was able to reorient patient and calm him down. Admission Date 10/29/2017   Admission Diagnosis Delirium tremens ([facility] [address]   Consults IP CONSULT TO HOSPITALIST        Consults   []PT   []OT   []Speech   []Case Management      [] Palliative      Cardiac Monitoring Order   []Yes   []No     IV drips   []Yes    Drip:                            Dose:  Drip:                            Dose:  Drip:                            Dose:   []No     GI Prophylaxis   []Yes   []No         DVT Prophylaxis   SCDs:             Solis stockings:         [] Medication   []Contraindicated   []None      Activity Level Activity Level: Bed Rest     Activity Assistance: Partial (one person)   Purposeful Rounding every 1-2 hour?    []Yes   Atkinson Score  Total Score: 4   Bed Alarm (If score 3 or >)   []Yes   [] Refused (See signed refusal form in chart)   Robinson Score  Robinson Score: 19   Robinson Score (if score 14 or less)   []PMT consult   []Wound Care consult []Specialty bed   [] Nutrition consult          Needs prior to discharge:   Home O2 required:    []Yes   []No    If yes, how much O2 required? Other:    Last Bowel Movement: Last Bowel Movement Date: 10/29/17      Influenza Vaccine Received Flu Vaccine for Current Season (usually Sept-March): No    Patient/Guardian Refused (Notify MD): No   Pneumonia Vaccine           Diet Active Orders   Diet    DIET CARDIAC Regular      LDAs               Peripheral IV 10/30/17 Right Hand (Active)   Site Assessment Clean, dry, & intact 10/30/2017 10:18 PM   Phlebitis Assessment 0 10/30/2017 10:18 PM   Infiltration Assessment 0 10/30/2017 10:18 PM   Dressing Status Clean, dry, & intact 10/30/2017 10:18 PM   Dressing Type Tape;Transparent 10/30/2017 10:18 PM   Hub Color/Line Status Blue; Infusing 10/30/2017 10:18 PM                      Urinary Catheter      Intake & Output   Date 10/30/17 0700 - 10/31/17 0659 10/31/17 0700 - 11/01/17 0659   Shift 1423-5871 7418-4524 24 Hour Total 6917-3446 8022-6924 24 Hour Total   I  N  T  A  K  E   P.O. 840  840         P. O. 840  840       Shift Total  (mL/kg) 840  (10.9)  840  (10.9)      O  U  T  P  U  T   Urine  (mL/kg/hr) 1676  (1.8)  1676  (0.9)         Urine Voided 1676  1676       Stool 1  1         Stool 1  1       Shift Total  (mL/kg) 1677  (21.7)  1677  (21.7)      NET -837  -837      Weight (kg) 77.1 77.1 77.1 77.1 77.1 77.1         Readmission Risk Assessment Tool Score Medium Risk            15       Total Score        3 Has Seen PCP in Last 6 Months (Yes=3, No=0)    4 IP Visits Last 12 Months (1-3=4, 4=9, >4=11)    8 Charlson Comorbidity Score (Age + Comorbid Conditions)        Criteria that do not apply:    . Living with Significant Other. Assisted Living. LTAC. SNF. or   Rehab    Patient Length of Stay (>5 days = 3)    Pt.  Coverage (Medicare=5 , Medicaid, or Self-Pay=4)       Expected Length of Stay 3d 9h   Actual Length of Stay 1

## 2017-10-31 NOTE — PROGRESS NOTES
Pt is a Avonne Conn old AAM admitted for Alcohol withdrawal and possible seizures. Pt was last admitted 8/29-9/2/17 for alcohol withdrawal. CM met pt mildly confused, in bed, sitter and nurse at bedside. Pt needs consistent safety reminders not to exit bed. CM introduced self and role. Board updated with GLOS, discharge plan. CM verified facesheet information. Pt states he lives in a 2 story house with wife and middle school aged nephew; 8 steps to entry. Pt stated he was independent of ADLs and driving prior to admission. He does not have any mobility assistive equipment at home but has a BP cuff. He has not had previous home health or rehab experience. He will be transported by family upon discharge. Note. CM spoke to pt about admission for alcoholism. He admits to only drinking Vodka but denies having a drinking problem. CM awaiting discharge recommendations and additional services to prevent readmission.     Cornell Pollock RN, [facility]   [phone]

## 2017-10-31 NOTE — PROGRESS NOTES
Patient agitated, restless, pulled off telemetry electrodes and pulse oximeter. Will reapply when patient calm.

## 2017-10-31 NOTE — PROGRESS NOTES
Initial Nutrition Assessment:    INTERVENTIONS/RECOMMENDATIONS:   · Meals/Snacks: General/healthful diet: Cardiac diet    ASSESSMENT:   Patient medically noted for alcohol withdrawal, fatty liver, and HTN. PMH for CVA. Tolerating cardiac diet well. PO % of meals at this time. Patient still with some confusion and agitation at times. Continue cardiac diet. Encourage PO intake of meals. Diet Order: Cardiac  % Eaten:  Patient Vitals for the past 72 hrs:   % Diet Eaten   10/30/17 1800 100 %   10/30/17 1300 90 %     Pertinent Medications: [x]Reviewed []Other: Goody bag, HCTZ  Pertinent Labs: [x]Reviewed []Other:   Food Allergies: [x]None []Other   Last BM: 10/29 []Active     []Hyperactive  []Hypoactive       [] Absent BS  Skin:    [x] Intact   [] Incision  [] Breakdown  [] Other:    Anthropometrics:   Height: 5' 9\" (175.3 cm) Weight: 77.1 kg (170 lb)   IBW (%IBW):   ( ) UBW (%UBW):   (  %)   Last Weight Metrics:  Weight Loss Metrics 10/29/2017 8/29/2017 7/15/2017 2/1/2017 1/24/2017 11/3/2016 9/23/2016   Today's Wt 170 lb 170 lb 3.1 oz 183 lb 3.2 oz 173 lb 11.6 oz 174 lb 9.7 oz 163 lb 179 lb 3.7 oz   BMI 25.1 kg/m2 25.13 kg/m2 27.05 kg/m2 25.65 kg/m2 25.78 kg/m2 24.07 kg/m2 26.47 kg/m2       BMI: Body mass index is 25.1 kg/(m^2). This BMI is indicative of:   []Underweight    []Normal    [x]Overweight    [] Obesity   [] Extreme Obesity (BMI>40)     Estimated Nutrition Needs (Based on):   2028 Kcals/day (BMR (1560) x 1.3 AF) , 77 g (1.0 g/kg bw) Protein  Carbohydrate:  At Least 130 g/day  Fluids: 2000 mL/day (1ml/kcal)    Pt expected to meet estimated nutrient needs: [x]Yes []No    NUTRITION DIAGNOSES:   Problem:  No nutritional diagnosis at this time      Etiology: related to       Signs/Symptoms: as evidenced by        NUTRITION INTERVENTIONS:  Meals/Snacks: General/healthful diet                  GOAL:   PO intake >70% of meals next 5-7 days    LEARNING NEEDS (Diet, Food/Nutrient-Drug Interaction):    [x] None Identified   [] Identified and Education Provided/Documented   [] Identified and Pt declined/was not appropriate     Cultureal, Hoahaoism, OR Ethnic Dietary Needs:    [x] None Identified   [] Identified and Addressed     [x] Interdisciplinary Care Plan Reviewed/Documented    [x] Discharge Planning: Heart healthy diet       MONITORING /EVALUATION:   Behavioral-Environmental Outcomes: Behavior  Food/Nutrient Intake Outcomes:  Total energy intake  Physical Signs/Symptoms Outcomes: Weight/weight change    NUTRITION RISK:    [] High              [] Moderate           [x]  Low  []  Minimal/Uncompromised    PT SEEN FOR:    []  MD Consult: []Calorie Count      []Diabetic Diet Education        []Diet Education     []Electrolyte Management     []General Nutrition Management and Supplements     []Management of Tube Feeding     []TPN Recommendations    [x]  RN Referral:  [x]MST score >=2     []Enteral/Parenteral Nutrition PTA     []Pregnant: Gestational DM or Multigestation     []Pressure Ulcer/Wound Care needs        []  Low BMI  []  DTR Referral       Kortney Boyd  Pager [phone]                 Weekend Pager [phone]

## 2017-10-31 NOTE — PROGRESS NOTES
Problem: Mobility Impaired (Adult and Pediatric)  Goal: *Acute Goals and Plan of Care (Insert Text)  Physical Therapy Goals  Initiated 10/31/2017  1. Patient will move from supine to sit and sit to supine , scoot up and down and roll side to side in bed with supervision/set-up within 7 day(s). 2.  Patient will transfer from bed to chair and chair to bed with supervision/set-up using the least restrictive device within 7 day(s). 3.  Patient will perform sit to stand with supervision/set-up within 7 day(s). 4.  Patient will ambulate with supervision/set-up for 500 feet with the least restrictive device within 7 day(s). 5.  Patient will ascend/descend 10 stairs with  handrail(s) with minimal assistance/contact guard assist within 7 day(s). physical Therapy EVALUATION  Seen 1105 to 1130  Patient: Javier Parker (64 y.o. male)  Date: 10/31/2017  Primary Diagnosis: Delirium tremens [facility])        Precautions:   Fall    ASSESSMENT :  Based on the objective data described below, the patient presents with decreased balance, safety awareness and functional independence. Pt drowsy on arrival but awoke to voice. He has poor safety awareness and is impulsive but with vc's he was able to ambulate 200' with r/w and min assist. Pt returned to bed with sitter present. Pt lives with his wife and expects to discharge home. He could benefit from [facility] PT for safety eval.     Patient will benefit from skilled intervention to address the above impairments.   Patients rehabilitation potential is considered to be Fair  Factors which may influence rehabilitation potential include:   []         None noted  []         Mental ability/status  [x]         Medical condition  []         Home/family situation and support systems  [x]         Safety awareness  []         Pain tolerance/management  []         Other:      PLAN :  Recommendations and Planned Interventions:  [x]           Bed Mobility Training             []    Neuromuscular Re-Education  [x]           Transfer Training                   []    Orthotic/Prosthetic Training  [x]           Gait Training                         []    Modalities  [x]           Therapeutic Exercises           []    Edema Management/Control  [x]           Therapeutic Activities            [x]    Patient and Family Training/Education  []           Other (comment):    Frequency/Duration: Patient will be followed by physical therapy  5 times a week to address goals. Discharge Recommendations: Home Health  Further Equipment Recommendations for Discharge: TBD     SUBJECTIVE:   Patient stated I'll try.     OBJECTIVE DATA SUMMARY:   HISTORY:    Past Medical History:   Diagnosis Date    Alcoholism ([facility] [address]     Hypertension     Ill-defined condition     Hernia     Seizures ([facility] [address]     Withdrawal      Past Surgical History:   Procedure Laterality Date    HX HERNIA REPAIR       Prior Level of Function/Home Situation: Pt lives with wife. Personal factors and/or comorbidities impacting plan of care:     Home Situation  Home Environment: Private residence  # Steps to Enter: 25  One/Two Story Residence: Two story  # of Interior Steps: 1  Height of Each Step (in): 1 inches  Interior Rails: Left  Lift Chair Available: No  Living Alone: No  Support Systems: Family member(s)  Patient Expects to be Discharged to[de-identified] Private residence  Current DME Used/Available at Home: None    EXAMINATION/PRESENTATION/DECISION MAKING:   Critical Behavior:  Neurologic State: Alert, Confused  Orientation Level: Oriented X4 (Periods of confusion)  Cognition: Decreased attention/concentration, Follows commands, Poor safety awareness, Impulsive  Safety/Judgement: Decreased awareness of need for safety, Lack of insight into deficits  Hearing:   Auditory  Auditory Impairment: Hard of hearing, left side  Range Of Motion:  AROM: Generally decreased, functional  Strength:    Strength: Generally decreased, functional  Functional Mobility:  Bed Mobility:  Rolling: Contact guard assistance  Supine to Sit: Contact guard assistance  Transfers:  Sit to Stand: Contact guard assistance  Stand to Sit: Contact guard assistance  Balance:   Sitting: Intact  Standing: Impaired  Standing - Static: Fair  Standing - Dynamic : Fair  Ambulation/Gait Training:  Distance (ft): 200 Feet (ft)  Assistive Device: Walker, rolling  Ambulation - Level of Assistance: Minimal assistance  Gait Abnormalities: Decreased step clearance  Base of Support: Widened  Speed/Lalita: Fluctuations    Functional Measure:  Barthel Index:    Bathin  Bladder: 0  Bowels: 5  Groomin  Dressin  Feedin  Mobility: 10  Stairs: 0  Toilet Use: 5  Transfer (Bed to Chair and Back): 5  Total: 35       Barthel and G-code impairment scale:  Percentage of impairment CH  0% CI  1-19% CJ  20-39% CK  40-59% CL  60-79% CM  80-99% CN  100%   Barthel Score 0-100 100 99-80 79-60 59-40 20-39 1-19   0   Barthel Score 0-20 20 17-19 13-16 9-12 5-8 1-4 0      The Barthel ADL Index: Guidelines  1. The index should be used as a record of what a patient does, not as a record of what a patient could do. 2. The main aim is to establish degree of independence from any help, physical or verbal, however minor and for whatever reason. 3. The need for supervision renders the patient not independent. 4. A patient's performance should be established using the best available evidence. Asking the patient, friends/relatives and nurses are the usual sources, but direct observation and common sense are also important. However direct testing is not needed. 5. Usually the patient's performance over the preceding 24-48 hours is important, but occasionally longer periods will be relevant. 6. Middle categories imply that the patient supplies over 50 per cent of the effort. 7. Use of aids to be independent is allowed. Anjel Shepard., Barthel, D.W. (9093). Functional evaluation: the Barthel Index. [facility] (14)2.   Ron Mayorga JESSI Almonte, Luz Garcia., Jina Landin., Zhane Benites. (1999). Measuring the change indisability after inpatient rehabilitation; comparison of the responsiveness of the Barthel Index and Functional [place] Measure. Journal of Neurology, Neurosurgery, and Psychiatry, 66(4), 591-125. CHOLO Marquez, KAYCEE Irving, & Jasmin Clarke M.A. (2004.) Assessment of post-stroke quality of life in cost-effectiveness studies: The usefulness of the Barthel Index and the EuroQoL-5D. Quality of Life Research, 13, 133-63       G codes: In compliance with CMSs Claims Based Outcome Reporting, the following G-code set was chosen for this patient based on their primary functional limitation being treated: The outcome measure chosen to determine the severity of the functional limitation was the Barthel with a score of 35/100 which was correlated with the impairment scale. ? Mobility - Walking and Moving Around:     - CURRENT STATUS: CL - 60%-79% impaired, limited or restricted    - GOAL STATUS: CJ - 20%-39% impaired, limited or restricted    - D/C STATUS:  ---------------To be determined---------------      Physical Therapy Evaluation Charge Determination   History Examination Presentation Decision-Making   MEDIUM  Complexity : 1-2 comorbidities / personal factors will impact the outcome/ POC  LOW Complexity : 1-2 Standardized tests and measures addressing body structure, function, activity limitation and / or participation in recreation  LOW Complexity : Stable, uncomplicated  LOW Complexity : FOTO score of       Based on the above components, the patient evaluation is determined to be of the following complexity level: LOW     Activity Tolerance:   Pt tolerated well  Please refer to the flowsheet for vital signs taken during this treatment.   After treatment:   []         Patient left in no apparent distress sitting up in chair  [x]         Patient left in no apparent distress in bed  [x]         Call bell left within reach  [x]         Nursing notified  [x]         Caregiver present  []         Bed alarm activated    COMMUNICATION/EDUCATION:   The patients plan of care was discussed with: Registered Nurse. [x]         Fall prevention education was provided and the patient/caregiver indicated understanding. [x]         Patient/family have participated as able in goal setting and plan of care. [x]         Patient/family agree to work toward stated goals and plan of care. []         Patient understands intent and goals of therapy, but is neutral about his/her participation. []         Patient is unable to participate in goal setting and plan of care.     Thank you for this referral.  rKisten Oshea, PT

## 2017-10-31 NOTE — PROGRESS NOTES
[address] IN REPORT:    Verbal report received from Mauricio Wallace RN (name) on Christian Swanson being received from PCU (unit) for routine progression of care. Report consisted of patients Situation, Background, Assessment and   Recommendations(SBAR). Information from the following report(s) SBAR, Kardex, Intake/Output, MAR, Accordion and Recent Results was reviewed with the receiving nurse. Opportunity for questions and clarification was provided. Assessment completed upon patients arrival to unit and care assumed. [id number] Primary Nurse Yobany Simental RN and Westley Mondragon RN performed a dual skin assessment on this patient. No impairment noted. Robinson score is 13.    1900 Bedside and Verbal shift change report given to Fly Benavides RN (oncoming nurse). Report included the following information SBAR, Kardex, Intake/Output, MAR, Accordion and Recent Results.

## 2017-10-31 NOTE — PROGRESS NOTES
Hospitalist Progress Note    NAME: Lidia Dominguez   :  1955   MRN:  [id number]       Assessment / Plan:  Alcohol withdrawal  POA  Chronic Heavy Alcoholism  Fatty Liver/elevated LFTs POA due to above  Lactic Acidosis, suspect has had Seizure POA  HTN as part of DT  -CT head neg  -TBili 1.8 ,, Alcohol level 57 on admission  -lactic acidosis resolved with IVF  -cont' librium (start taper dose tomorrow) and continue CIWA protocol  -cont' goody bad x 3 days, can transition to PO   -seizure/aspiration precaution  -obtain EEG  -PT/OT/CM consult    Addendum:  Pt became restless with hallucinations around 5:30PM  Nursing staffs were not able to reorient pt. His ciwa at the time was 20   Will transfer pt to CCU  Start precedex gtt, wean as tolerate  Librium 25mg tid if can tolerate PO intake  Cont CIWA with ativan. IV Valium is currently on back order   Start gentle NS and keep NPO due to risk of aspiration   Monitor for seizure activities    Discussed with pharmacy/nursing staffs     Accelerated HTN  H/o CVA  -clonidine prn for BP and agitation due to DT  -on  HCTZ at home          Code Status: Full  Surrogate Decision Maker: wife Avelino Alfonso # [phone]      DVT Prophylaxis: Sq lovenox  GI Prophylaxis: IV pepcid    Baseline: Pt is independent at home with wife     Subjective:     Chief Complaint / Reason for Physician Visit  Pt seen at bedside, confused at times. Still very tremulous, unstable on his feet. Discussed with RN events overnight. Review of Systems:  Symptom Y/N Comments  Symptom Y/N Comments   Fever/Chills    Chest Pain     Poor Appetite    Edema     Cough    Abdominal Pain     Sputum    Joint Pain     SOB/AMEZQUITA    Pruritis/Rash     Nausea/vomit    Tolerating PT/OT     Diarrhea    Tolerating Diet     Constipation    Other       Could NOT obtain due to: intermittent confused     Objective:     VITALS:   Last 24hrs VS reviewed since prior progress note.  Most recent are:  Patient Vitals for the past 24 hrs:   Temp Pulse Resp BP SpO2   10/31/17 1039 98.2 °F (36.8 °C) 99 18 143/64 99 %   10/31/17 0847 - 92 - 142/89 -   10/31/17 0744 98.1 °F (36.7 °C) 91 18 142/89 99 %   10/31/17 0427 - - 18 (!) 156/134 -   10/30/17 2333 98 °F (36.7 °C) 86 18 130/86 99 %   10/30/17 2101 - - - (!) 154/102 -   10/30/17 2015 - 90 20 (!) 154/102 98 %   10/30/17 1503 98.1 °F (36.7 °C) 95 20 (!) 147/113 97 %       Intake/Output Summary (Last 24 hours) at 10/31/17 1248  Last data filed at 10/31/17 0814   Gross per 24 hour   Intake          1949.17 ml   Output              852 ml   Net          1097.17 ml        PHYSICAL EXAM:  General: Pt in bed, appears to be confused, tremulous   EENT:  EOMI. Anicteric sclerae. MMM  Resp:  CTA bilaterally, no wheezing or rales. No accessory muscle use  CV:  Tachycardic,  No edema  GI:  Soft, Non distended, Non tender.  +BS  Neurologic:  AAOx2, conversant but confused at times  Psych:   Not anxious nor agitated  Skin:  No rashes. No jaundice    Reviewed most current lab test results and cultures  YES  Reviewed most current radiology test results   YES  Review and summation of old records today    NO  Reviewed patient's current orders and MAR    YES  PMH/ reviewed - no change compared to H&P  ________________________________________________________________________  Care Plan discussed with:    Comments   Patient x    Family      RN x    Care Manager     Consultant                        Multidiciplinary team rounds were held today with , nursing, pharmacist and clinical coordinator. Patient's plan of care was discussed; medications were reviewed and discharge planning was addressed.      ________________________________________________________________________  Total NON critical care TIME:    Minutes    Total CRITICAL CARE TIME Spent:  45 Minutes non procedure based      Comments   >50% of visit spent in counseling and coordination of care ________________________________________________________________________  Sharon Soriano MD     Procedures: see electronic medical records for all procedures/Xrays and details which were not copied into this note but were reviewed prior to creation of Plan. LABS:  I reviewed today's most current labs and imaging studies.   Pertinent labs include:  Recent Labs      10/30/17   0107   WBC  3.6*   HGB  12.9   HCT  37.5   PLT  122*     Recent Labs      10/31/17   0520  10/30/17   0757  10/30/17   0010   NA  140  137  135*   K  3.8  3.3*  3.6   CL  108  102  94*   CO2  24  27  16*   GLU  97  101*  110*   BUN  1*  3*  3*   CREA  0.62*  0.68*  1.10   CA  8.2*  8.2*  9.5   MG   --   2.2   --    ALB  3.6  3.6  4.4   TBILI  2.5*  2.3*  1.8*   SGOT  58*  89*  122*   ALT  39  53  68       Signed: Sharon Soriano MD

## 2017-11-01 LAB
ANION GAP SERPL CALC-SCNC: 8 MMOL/L (ref 5–15)
BASOPHILS # BLD: 0 K/UL (ref 0–0.1)
BASOPHILS NFR BLD: 1 % (ref 0–1)
BUN SERPL-MCNC: 3 MG/DL (ref 6–20)
BUN/CREAT SERPL: 6 (ref 12–20)
CALCIUM SERPL-MCNC: 9 MG/DL (ref 8.5–10.1)
CHLORIDE SERPL-SCNC: 107 MMOL/L (ref 97–108)
CO2 SERPL-SCNC: 24 MMOL/L (ref 21–32)
CREAT SERPL-MCNC: 0.53 MG/DL (ref 0.7–1.3)
EOSINOPHIL # BLD: 0.1 K/UL (ref 0–0.4)
EOSINOPHIL NFR BLD: 3 % (ref 0–7)
ERYTHROCYTE [DISTWIDTH] IN BLOOD BY AUTOMATED COUNT: 12.4 % (ref 11.5–14.5)
GLUCOSE SERPL-MCNC: 97 MG/DL (ref 65–100)
HCT VFR BLD AUTO: 37.9 % (ref 36.6–50.3)
HGB BLD-MCNC: 12.7 G/DL (ref 12.1–17)
LYMPHOCYTES # BLD: 0.8 K/UL (ref 0.8–3.5)
LYMPHOCYTES NFR BLD: 29 % (ref 12–49)
MCH RBC QN AUTO: 34.8 PG (ref 26–34)
MCHC RBC AUTO-ENTMCNC: 33.5 G/DL (ref 30–36.5)
MCV RBC AUTO: 103.8 FL (ref 80–99)
MONOCYTES # BLD: 0.4 K/UL (ref 0–1)
MONOCYTES NFR BLD: 14 % (ref 5–13)
NEUTS SEG # BLD: 1.5 K/UL (ref 1.8–8)
NEUTS SEG NFR BLD: 53 % (ref 32–75)
PLATELET # BLD AUTO: 141 K/UL (ref 150–400)
POTASSIUM SERPL-SCNC: 3.5 MMOL/L (ref 3.5–5.1)
RBC # BLD AUTO: 3.65 M/UL (ref 4.1–5.7)
SODIUM SERPL-SCNC: 139 MMOL/L (ref 136–145)
WBC # BLD AUTO: 2.9 K/UL (ref 4.1–11.1)

## 2017-11-01 PROCEDURE — 74011000258 HC RX REV CODE- 258: Performed by: INTERNAL MEDICINE

## 2017-11-01 PROCEDURE — 74011250637 HC RX REV CODE- 250/637: Performed by: INTERNAL MEDICINE

## 2017-11-01 PROCEDURE — 80048 BASIC METABOLIC PNL TOTAL CA: CPT | Performed by: INTERNAL MEDICINE

## 2017-11-01 PROCEDURE — 74011250636 HC RX REV CODE- 250/636: Performed by: INTERNAL MEDICINE

## 2017-11-01 PROCEDURE — 97116 GAIT TRAINING THERAPY: CPT

## 2017-11-01 PROCEDURE — 97165 OT EVAL LOW COMPLEX 30 MIN: CPT | Performed by: OCCUPATIONAL THERAPIST

## 2017-11-01 PROCEDURE — G8987 SELF CARE CURRENT STATUS: HCPCS | Performed by: OCCUPATIONAL THERAPIST

## 2017-11-01 PROCEDURE — 74011000250 HC RX REV CODE- 250: Performed by: INTERNAL MEDICINE

## 2017-11-01 PROCEDURE — G8988 SELF CARE GOAL STATUS: HCPCS | Performed by: OCCUPATIONAL THERAPIST

## 2017-11-01 PROCEDURE — 65620000000 HC RM CCU GENERAL

## 2017-11-01 PROCEDURE — 85025 COMPLETE CBC W/AUTO DIFF WBC: CPT | Performed by: INTERNAL MEDICINE

## 2017-11-01 PROCEDURE — 36415 COLL VENOUS BLD VENIPUNCTURE: CPT | Performed by: INTERNAL MEDICINE

## 2017-11-01 RX ORDER — ENOXAPARIN SODIUM 100 MG/ML
40 INJECTION SUBCUTANEOUS EVERY 24 HOURS
Status: DISCONTINUED | OUTPATIENT
Start: 2017-11-01 | End: 2017-11-03 | Stop reason: HOSPADM

## 2017-11-01 RX ORDER — FOLIC ACID 1 MG/1
1 TABLET ORAL DAILY
Status: DISCONTINUED | OUTPATIENT
Start: 2017-11-02 | End: 2017-11-03 | Stop reason: HOSPADM

## 2017-11-01 RX ORDER — LANOLIN ALCOHOL/MO/W.PET/CERES
100 CREAM (GRAM) TOPICAL DAILY
Status: DISCONTINUED | OUTPATIENT
Start: 2017-11-02 | End: 2017-11-03 | Stop reason: HOSPADM

## 2017-11-01 RX ORDER — THERA TABS 400 MCG
1 TAB ORAL DAILY
Status: DISCONTINUED | OUTPATIENT
Start: 2017-11-02 | End: 2017-11-03 | Stop reason: HOSPADM

## 2017-11-01 RX ADMIN — LORAZEPAM 4 MG: 2 INJECTION INTRAMUSCULAR; INTRAVENOUS at 12:16

## 2017-11-01 RX ADMIN — Medication 10 ML: at 03:45

## 2017-11-01 RX ADMIN — MUPIROCIN: 20 OINTMENT TOPICAL at 08:49

## 2017-11-01 RX ADMIN — Medication 10 ML: at 13:18

## 2017-11-01 RX ADMIN — Medication 10 ML: at 21:35

## 2017-11-01 RX ADMIN — MUPIROCIN: 20 OINTMENT TOPICAL at 17:54

## 2017-11-01 RX ADMIN — CHLORDIAZEPOXIDE HYDROCHLORIDE 25 MG: 25 CAPSULE ORAL at 08:49

## 2017-11-01 RX ADMIN — SODIUM CHLORIDE 0.6 MCG/KG/HR: 900 INJECTION, SOLUTION INTRAVENOUS at 01:57

## 2017-11-01 RX ADMIN — LORAZEPAM 4 MG: 2 INJECTION INTRAMUSCULAR; INTRAVENOUS at 23:10

## 2017-11-01 RX ADMIN — CHLORDIAZEPOXIDE HYDROCHLORIDE 25 MG: 25 CAPSULE ORAL at 21:35

## 2017-11-01 RX ADMIN — CHLORDIAZEPOXIDE HYDROCHLORIDE 25 MG: 25 CAPSULE ORAL at 16:26

## 2017-11-01 RX ADMIN — HYDROCHLOROTHIAZIDE 12.5 MG: 25 TABLET ORAL at 08:49

## 2017-11-01 RX ADMIN — ENOXAPARIN SODIUM 40 MG: 40 INJECTION SUBCUTANEOUS at 10:59

## 2017-11-01 RX ADMIN — FOLIC ACID: 5 INJECTION, SOLUTION INTRAMUSCULAR; INTRAVENOUS; SUBCUTANEOUS at 03:45

## 2017-11-01 NOTE — PROGRESS NOTES
1900 Report received from Nikunj Tena RN.    2040 Pt extremely agitated, confused, constantly trying to get out of bed. PRN ativan given with minimal effect. Precedex up to 0.7. Sitter at bedside. 2200 Pt resting quietly, VSS.     0420 Precedex stopped. HR in 40-50s. Pt arousable but quickly drifts back to sleep.     0600 HR in 60s, precedex remains off, pt resting quietly. 9401 Precedex restarted at 0.3, pt awake, restless & pulling at leads & gown, HR in 110s.     0700 Report given to Nikunj Tena RN.

## 2017-11-01 NOTE — PROGRESS NOTES
Interdisciplinary team rounds were held 11/1/2017 with the following team members:Care Management, Diabetes Treatment Specialist, Nursing, Nutrition, Pharmacy, Physical Therapy and Physician. Plan of care discussed. Goal: Adjust medications, continue to monitor and support. See MD orders and progress notes for further  interventions and desired outcomes.

## 2017-11-01 NOTE — PROGRESS NOTES
Problem: Mobility Impaired (Adult and Pediatric)  Goal: *Acute Goals and Plan of Care (Insert Text)  Physical Therapy Goals  Initiated 10/31/2017  1. Patient will move from supine to sit and sit to supine , scoot up and down and roll side to side in bed with supervision/set-up within 7 day(s). 2.  Patient will transfer from bed to chair and chair to bed with supervision/set-up using the least restrictive device within 7 day(s). 3.  Patient will perform sit to stand with supervision/set-up within 7 day(s). 4.  Patient will ambulate with supervision/set-up for 500 feet with the least restrictive device within 7 day(s). 5.  Patient will ascend/descend 10 stairs with  handrail(s) with minimal assistance/contact guard assist within 7 day(s). physical Therapy TREATMENT  Patient: Esther Mixon (89 y.o. male)  Date: 11/1/2017  Diagnosis: Delirium tremens ([facility] [address] <principal problem not specified>       Precautions: Fall, Bed Alarm (sitter)    ASSESSMENT:  Patient received at bedside chair with sitter present. He was oriented to person only, perseverating over lines attached despite RN thankfully removing as much as possible for mobility. Completed gait as noted below in addition to therapeutic exercises at conclusion as he voiced desire to perform exercises throughout the day. Patient understands 'alcohol' is part of the reason for his admission, as well as reason behind significant generalized tremors that impact upright stability. He does well with verbal, tactile commands to facilitate improved step lengths and gait speed though is not near likely baseline level of function. With progress hopefully seen ahead with continued movement from withdrawals, expect him to be appropriate to return [facility] vs rehab given fall risk and safety concerns.      Progression toward goals:  []    Improving appropriately and progressing toward goals  [x]    Improving slowly and progressing toward goals  []    Not making progress toward goals and plan of care will be adjusted     PLAN:  Patient continues to benefit from skilled intervention to address the above impairments. Continue treatment per established plan of care. Discharge Recommendations: To Be Determined  Further Equipment Recommendations for Discharge:  defer     SUBJECTIVE:   Patient stated All these damn lines man.     OBJECTIVE DATA SUMMARY:   Critical Behavior:  Neurologic State: Alert, Confused  Orientation Level: Oriented to person  Cognition: Follows commands, Impaired decision making, Poor safety awareness  Safety/Judgement: Decreased awareness of need for safety, Decreased awareness of need for assistance, Decreased insight into deficits  Functional Mobility Training:     Transfers:  Sit to Stand: Moderate assistance;Assist x2  Stand to Sit: Moderate assistance;Assist x2                             Balance:  Sitting: Intact  Standing: Impaired  Standing - Static: Poor (to fair. posterior lean initially)  Standing - Dynamic : Poor (to fair)  Ambulation/Gait Training:  Distance (ft): 160 Feet (ft)  Assistive Device: Gait belt (cart)  Ambulation - Level of Assistance: Contact guard assistance;Minimal assistance     Gait Description (WDL): Exceptions to WDL  Gait Abnormalities: Decreased step clearance        Base of Support: Widened;Center of gravity altered (posterior lean)     Speed/Lalita: Pace decreased (<100 feet/min) (improved with cueing)           Interventions: Safety awareness training; Tactile cues; Verbal cues            Therapeutic Exercises:   1:30 each of LAQ, ankle pumps, marching BLE AROM with verbal instruction in seated     Pain:  Pain Scale 1: Numeric (0 - 10)  Pain Intensity 1: 0              Activity Tolerance:   VSS on RA     Please refer to the flowsheet for vital signs taken during this treatment.   After treatment:   [x]    Patient left in no apparent distress sitting up in chair  []    Patient left in no apparent distress in bed  [x]    Call bell left within reach  [x]    Nursing notified  [x]    sitter present  [x]    Bed alarm activated    COMMUNICATION/COLLABORATION:   The patients plan of care was discussed with: Registered Nurse    Rayne Vasquez PT, DPT, NOMI      Time Calculation: 17 mins

## 2017-11-01 NOTE — PROGRESS NOTES
11/1/2017    INTENSIVIST PROGRESS NOTE:     Patient seen and evaluated. Transferred to the ICU overnight due to uncontrollable alcohol withdrawal, now sedated on Precedex drip.     Visit Vitals    /87    Pulse 62    Temp 95.8 °F (35.4 °C)    Resp 19    Ht 5' 9\" (1.753 m)    Wt 77.1 kg (170 lb)    SpO2 95%    BMI 25.1 kg/m2     GEN: NAD, sedated  CV: bradycardic, regular  Lungs: CTA B    Labs reviewed    A/P:  - acute alcohol withdrawal - currently on precedex drip - wean as much as possible especially with his bradycardia, continue scheduled and PRN benzodiazepines, add antipsychotics if needed  - alcoholism - thiamine, MVI, folate  - HTN - antihypertensives as needed  - DVT prophyaxis  Samuel Nguyen MD

## 2017-11-01 NOTE — PROGRESS NOTES
Occupational Therapy Goals  Initiated 11/1/2017  1. Patient will perform grooming standing at sink with supervision/set-up within 7 day(s). 2.  Patient will perform upper body dressing with supervision/set-up within 7 day(s). 3.  Patient will perform lower body dressing with supervision/set-up within 7 day(s). 4.  Patient will perform toilet transfers with supervision/set-up within 7 day(s). 5.  Patient will perform all aspects of toileting with supervision/set-up within 7 day(s). 6.  Patient will perform sponge bathing with supervision/set-up within 7 day(s). Occupational Therapy EVALUATION  Patient: Ernst Suh (11 y.o. male)  Date: 11/1/2017  Primary Diagnosis: Delirium tremens [facility])        Precautions: Fall    ASSESSMENT :  Based on the objective data described below, the patient presents with tremors, GW, decreased activity tolerance, decreased safety awareness and decreased balance which is impairing his functional independence. Patient's baseline is unclear, but at this time he is performing ADLs at a supervision to mod A level, is mod A of 2 for sit to stand transfers and is CGA to min A for ambulation. Patient will benefit from skilled intervention to address the above impairments.  Anticipate patient to progress quickly once he is no longer experiencing the effects of his ETOH withdrawal.   Patients rehabilitation potential is considered to be Good  Factors which may influence rehabilitation potential include:   []             None noted  []             Mental ability/status  []             Medical condition  []             Home/family situation and support systems  []             Safety awareness  []             Pain tolerance/management  []             Other:      PLAN :  Recommendations and Planned Interventions:  [x]               Self Care Training                  []        Therapeutic Activities  [x]               Functional Mobility Training    []        Cognitive Retraining  [x] Therapeutic Exercises           [x]        Endurance Activities  [x]               Balance Training                   []        Neuromuscular Re-Education  []               Visual/Perceptual Training     [x]   Home Safety Training  [x]               Patient Education                 [x]        Family Training/Education  []               Other (comment):    Frequency/Duration: Patient will be followed by occupational therapy 3 times a week to address goals. Discharge Recommendations: To Be Determined, pending progress  Further Equipment Recommendations for Discharge: TBD         OBJECTIVE DATA SUMMARY:     Past Medical History:   Diagnosis Date    Alcoholism ([facility] [address]     Hypertension     Ill-defined condition     Hernia     Seizures ([facility] [address]     Withdrawal      Past Surgical History:   Procedure Laterality Date    HX HERNIA REPAIR       Prior Level of Function/Home Situation: Patient unable to accurately report 2/2 acute confusion due to withdrawal from ETOH  Expanded or extensive additional review of patient history:     Home Situation  Home Environment: Private residence  # Steps to Enter: 25  One/Two Story Residence: Two story  # of Interior Steps: 1  Height of Each Step (in): 1 inches  Interior Rails: Left  Lift Chair Available: No  Living Alone: No  Support Systems: Family member(s)  Patient Expects to be Discharged to[de-identified] Private residence  Current DME Used/Available at Home: None  [x]  Right hand dominant   []  Left hand dominant  Cognitive/Behavioral Status:  Neurologic State: Alert;Confused  Orientation Level: Oriented to person  Cognition: Follows commands; Impaired decision making;Poor safety awareness        Safety/Judgement: Decreased awareness of need for safety;Decreased awareness of need for assistance;Decreased insight into deficits    Vision/Perceptual:     Acuity: Within Defined Limits       Range of Motion:  AROM: Generally decreased, functional     Strength:  Strength: Generally decreased, functional  Coordination:  Coordination: Generally decreased, functional (tremulous t/o BUEs and LEs)  Fine Motor Skills-Upper: Left Impaired;Right Impaired       Tone & Sensation:  Tone: Normal  Balance:  Sitting: Intact  Standing: Impaired  Standing - Static: Poor (to fair. posterior lean initially)  Standing - Dynamic : Poor (to fair)  Functional Mobility and Transfers for ADLs:  Bed Mobility:           Scooting: Contact guard assistance  Transfers:  Sit to Stand: Moderate assistance;Assist x2      CGA to min A for ambulation to sink. ADL Assessment:  Feeding: Supervision;Setup    Oral Facial Hygiene/Grooming: Minimum assistance    Bathing: Moderate assistance    Upper Body Dressing: Moderate assistance    Lower Body Dressing: Moderate assistance    Toileting: Moderate assistance                Functional Measure:  Barthel Index:    Bathin  Bladder: 0  Bowels: 5  Groomin  Dressin  Feedin  Mobility: 10  Stairs: 0  Toilet Use: 0  Transfer (Bed to Chair and Back): 5  Total: 25       Barthel and G-code impairment scale:  Percentage of impairment CH  0% CI  1-19% CJ  20-39% CK  40-59% CL  60-79% CM  80-99% CN  100%   Barthel Score 0-100 100 99-80 79-60 59-40 20-39 1-19   0   Barthel Score 0-20 20 17-19 13-16 9-12 5-8 1-4 0      The Barthel ADL Index: Guidelines  1. The index should be used as a record of what a patient does, not as a record of what a patient could do. 2. The main aim is to establish degree of independence from any help, physical or verbal, however minor and for whatever reason. 3. The need for supervision renders the patient not independent. 4. A patient's performance should be established using the best available evidence. Asking the patient, friends/relatives and nurses are the usual sources, but direct observation and common sense are also important. However direct testing is not needed.   5. Usually the patient's performance over the preceding 24-48 hours is important, but occasionally longer periods will be relevant. 6. Middle categories imply that the patient supplies over 50 per cent of the effort. 7. Use of aids to be independent is allowed. Suyapa Hernandez., Barthel, D.W. (8676). Functional evaluation: the Barthel Index. [address] [facility] (14)2. Ke JESSI Lira, Yumiko Parekh., Jessica Barrios., Giuseppe, [facility] (1999). Measuring the change indisability after inpatient rehabilitation; comparison of the responsiveness of the Barthel Index and Functional [place] Measure. Journal of Neurology, Neurosurgery, and Psychiatry, 66(4), 290-005. Farr Arms, N.[place].A, KAYCEE Irving, & Kathryn Wells MCARLOS. (2004.) Assessment of post-stroke quality of life in cost-effectiveness studies: The usefulness of the Barthel Index and the EuroQoL-5D. Quality of Life Research, 13, 427-43       In compliance with CMSs Claims Based Outcome Reporting, the following G-code set was chosen for this patient based on their primary functional limitation being treated: The outcome measure chosen to determine the severity of the functional limitation was the Barthel Index with a score of 25/100 which was correlated with the impairment scale. ? Self Care:     - CURRENT STATUS: CL - 60%-79% impaired, limited or restricted    - GOAL STATUS:  CK - 40%-59% impaired, limited or restricted    - D/C STATUS:  ---------------To be determined---------------       ADL Intervention and task modifications:  Initiated dressing ADL, transfer, standing grooming and safety training.       Pain:  Pain Scale 1: Numeric (0 - 10)  Pain Intensity 1: 0              Activity Tolerance:   VSS    After treatment:   [x] Patient left in no apparent distress sitting up in chair  [] Patient left in no apparent distress in bed  [x] Call bell left within reach  [x] Nursing notified  [x] Caregiver present  [x] Bed alarm activated    COMMUNICATION/EDUCATION:   The patients plan of care was discussed with: Physical Therapist.  [x] Home safety education was provided and the patient/caregiver indicated understanding. [x] Patient/family have participated as able in goal setting and plan of care. [x] Patient/family agree to work toward stated goals and plan of care. [] Patient understands intent and goals of therapy, but is neutral about his/her participation. [] Patient is unable to participate in goal setting and plan of care. This patients plan of care is appropriate for delegation to [facility].     Thank you for this referral.  Moe Wang, OTR/L  Time Calculation: 13 mins

## 2017-11-01 NOTE — PROGRESS NOTES
Hospitalist Progress Note    NAME: Noemi Zacarias   :  1955   MRN:  [id number]       Assessment / Plan:  Alcohol withdrawal  POA  Chronic Heavy Alcoholism  Fatty Liver/elevated LFTs POA due to above  Lactic Acidosis, suspect has had Seizure POA  HTN as part of DT  -CT head neg  -TBili 1.8 ,, Alcohol level 57 on admission  -EEG nl  -lactic acidosis resolved with IVF  -pt transferred to CCU yesterday and started on precedex gtt due to visual hallucinations and agitation with CIWA 20  -cont' librium and continue CIWA protocol  -weaning off precedex  -transitioned to PO MVI, thiamine, folic acid  -seizure precaution  -PT/OT/CM consult    Accelerated HTN  H/o CVA  -clonidine prn for BP and agitation due to DT  -on  HCTZ at home          Code Status: Full  Surrogate Decision Maker: wife Reuben Clarke # [phone]      DVT Prophylaxis: Sq lovenox  GI Prophylaxis: IV pepcid    Baseline: Pt is independent at home with wife     Subjective:     Chief Complaint / Reason for Physician Visit  Pt seen at bedside, awake and alert. Appropriate conversation. +tremors. Discussed with RN events overnight. Review of Systems:  Symptom Y/N Comments  Symptom Y/N Comments   Fever/Chills n   Chest Pain n    Poor Appetite    Edema     Cough    Abdominal Pain n    Sputum    Joint Pain     SOB/AMEZQUITA n   Pruritis/Rash     Nausea/vomit    Tolerating PT/OT y    Diarrhea    Tolerating Diet     Constipation    Other       Could NOT obtain due to:      Objective:     VITALS:   Last 24hrs VS reviewed since prior progress note.  Most recent are:  Patient Vitals for the past 24 hrs:   Temp Pulse Resp BP SpO2   17 1200 97.4 °F (36.3 °C) 99 18 (!) 149/103 97 %   17 1100 - 76 18 (!) 121/101 98 %   17 1000 - 63 20 118/81 97 %   17 0900 - 60 16 153/85 98 %   17 0800 97.2 °F (36.2 °C) 60 20 129/87 97 %   17 0700 - 63 21 (!) 143/96 98 %   17 0600 - 62 19 122/87 95 %   17 0500 - (!) 57 20 (!) 133/92 96 %   11/01/17 0428 95.8 °F (35.4 °C) (!) 51 24 (!) 156/94 93 %   11/01/17 0400 - 97 27 - (!) 66 %   11/01/17 0300 - (!) 56 21 123/88 96 %   11/01/17 0200 - 60 25 126/78 97 %   11/01/17 0100 - 64 22 112/75 95 %   11/01/17 0000 97 °F (36.1 °C) 70 24 115/78 94 %   10/31/17 2300 - 73 19 114/84 97 %   10/31/17 2200 - 89 (!) 35 138/85 90 %   10/31/17 2100 - (!) 102 (!) 36 109/77 94 %   10/31/17 2030 97.5 °F (36.4 °C) (!) 113 27 (!) 145/104 97 %   10/31/17 2000 - (!) 118 (!) 37 - 95 %   10/31/17 1900 - (!) 119 29 (!) 154/101 97 %   10/31/17 1849 98.3 °F (36.8 °C) (!) 114 15 (!) 138/91 -   10/31/17 1446 98.5 °F (36.9 °C) (!) 111 18 130/83 98 %       Intake/Output Summary (Last 24 hours) at 11/01/17 1242  Last data filed at 11/01/17 1200   Gross per 24 hour   Intake          2014.85 ml   Output              925 ml   Net          1089.85 ml        PHYSICAL EXAM:  General: Pt in bed, appears to be confused, tremulous   EENT:  EOMI. Anicteric sclerae. MMM  Resp:  CTA bilaterally, no wheezing or rales. No accessory muscle use  CV:  Tachycardic,  No edema  GI:  Soft, Non distended, Non tender.  +BS  Neurologic:  AAOx3, conversant, moving all exts. +tremors  Psych:   Not anxious nor agitated  Skin:  No rashes. No jaundice    Reviewed most current lab test results and cultures  YES  Reviewed most current radiology test results   YES  Review and summation of old records today    NO  Reviewed patient's current orders and MAR    YES  PMH/SH reviewed - no change compared to H&P  ________________________________________________________________________  Care Plan discussed with:    Comments   Patient x    Family      RN x    Care Manager     Consultant                        Multidiciplinary team rounds were held today with , nursing, pharmacist and clinical coordinator. Patient's plan of care was discussed; medications were reviewed and discharge planning was addressed. ________________________________________________________________________  Total NON critical care TIME:  35 Minutes    Total CRITICAL CARE TIME Spent:  Minutes non procedure based      Comments   >50% of visit spent in counseling and coordination of care     ________________________________________________________________________  Ryan Morgan MD     Procedures: see electronic medical records for all procedures/Xrays and details which were not copied into this note but were reviewed prior to creation of Plan. LABS:  I reviewed today's most current labs and imaging studies.   Pertinent labs include:  Recent Labs      11/01/17   0331  10/30/17   0107   WBC  2.9*  3.6*   HGB  12.7  12.9   HCT  37.9  37.5   PLT  141*  122*     Recent Labs      11/01/17   0428  10/31/17   0520  10/30/17   0757  10/30/17   0010   NA  139  140  137  135*   K  3.5  3.8  3.3*  3.6   CL  107  108  102  94*   CO2  24  24  27  16*   GLU  97  97  101*  110*   BUN  3*  1*  3*  3*   CREA  0.53*  0.62*  0.68*  1.10   CA  9.0  8.2*  8.2*  9.5   MG   --    --   2.2   --    ALB   --   3.6  3.6  4.4   TBILI   --   2.5*  2.3*  1.8*   SGOT   --   58*  89*  122*   ALT   --   39  53  68       Signed: Ryan Morgan MD

## 2017-11-01 NOTE — PROGRESS NOTES
0700 Bedside and Verbal shift change report received from Vannessa [facility] (offgoing nurse). Report included the following information SBAR, Kardex, Intake/Output, MAR, Accordion and Recent Results. 0800 Assessment complete. See flowsheet for details. Patient is on room air, resting quietly, awakens to voice. Precedex gtt infusing. Patient is not oriented at all, even to self; however cooperative. Patient taking oral medications with no issues, only requiring constant reorienting and coaching. CIWA score 10. VSS. Sitter at bedside. Will monitor. [address] Patient's wife Lisbeth Ferguson calling for an update. 1130 Patient waking up agitated and confused, wanting to urinate. Sitter attempting to assist patient who only becomes more agitated with assistance. After reorienting and coaching, patient cooperative, voided, and requested to sit in chair. Patient placed in chair. Sitter remains at bedside. Will monitor. 1145 PT/OT at bedside. 1200 Reassessment complete. Patient now awake and alert, following commands. A&OX4, understands that he is in the hospital for alcohol withdrawal, verbalizing that he wants to quit. Ativan given per CIWA score. Sitting comfortably in chair with sitter at bedside. 1600 Reassessment unchanged. Patient resting comfortably in chair with sitter at bedside. No needs at this time. Will continue to monitor closely. 1900 Bedside and Verbal shift change report given to RICARDO Hurd (oncoming nurse). Report included the following information SBAR, Kardex, Intake/Output, MAR, Accordion and Recent Results.

## 2017-11-01 NOTE — PROCEDURES
[address]   EEG       Name:  Windle Babinski   MR#:  [id number]   :  1955   Account #:  [de-identified]    Date of Procedure:  10/31/2017   Date of Adm:  10/29/2017       DATE OF PROCEDURE: 10/31/2017. EXAM NUMBER: . DESCRIPTION OF PROCEDURE: Electrodes were applied in   accordance with International 10/20 system of electrode placement. EEG was reviewed in both bipolar and referential montages. The   background consists of symmetric 10-11 Hz activity which attenuated   with eye opening. Photic stimulation produced symmetric occipital   drive. No seizures or interictal hallmarks of epilepsy are noted on the   study. IMPRESSION: Normal electroencephalogram. The absence of   seizures on the study does not exclude a diagnosis of epilepsy. Clinical   correlation advised.         MD Russ Mendoza   D:  10/31/2017   17:01   T:  2017   07:54   Job #:  [id number]

## 2017-11-02 LAB
ANION GAP SERPL CALC-SCNC: 8 MMOL/L (ref 5–15)
BASOPHILS # BLD: 0 K/UL (ref 0–0.1)
BASOPHILS NFR BLD: 1 % (ref 0–1)
BUN SERPL-MCNC: 8 MG/DL (ref 6–20)
BUN/CREAT SERPL: 11 (ref 12–20)
CALCIUM SERPL-MCNC: 8.8 MG/DL (ref 8.5–10.1)
CHLORIDE SERPL-SCNC: 103 MMOL/L (ref 97–108)
CO2 SERPL-SCNC: 27 MMOL/L (ref 21–32)
CREAT SERPL-MCNC: 0.73 MG/DL (ref 0.7–1.3)
DIFFERENTIAL METHOD BLD: ABNORMAL
EOSINOPHIL # BLD: 0.1 K/UL (ref 0–0.4)
EOSINOPHIL NFR BLD: 2 % (ref 0–7)
ERYTHROCYTE [DISTWIDTH] IN BLOOD BY AUTOMATED COUNT: 12.5 % (ref 11.5–14.5)
GLUCOSE SERPL-MCNC: 87 MG/DL (ref 65–100)
HCT VFR BLD AUTO: 38.7 % (ref 36.6–50.3)
HGB BLD-MCNC: 12.6 G/DL (ref 12.1–17)
LYMPHOCYTES # BLD: 0.8 K/UL (ref 0.8–3.5)
LYMPHOCYTES NFR BLD: 29 % (ref 12–49)
MCH RBC QN AUTO: 32.9 PG (ref 26–34)
MCHC RBC AUTO-ENTMCNC: 32.6 G/DL (ref 30–36.5)
MCV RBC AUTO: 101 FL (ref 80–99)
MONOCYTES # BLD: 0.6 K/UL (ref 0–1)
MONOCYTES NFR BLD: 21 % (ref 5–13)
NEUTS SEG # BLD: 1.4 K/UL (ref 1.8–8)
NEUTS SEG NFR BLD: 47 % (ref 32–75)
PLATELET # BLD AUTO: 116 K/UL (ref 150–400)
POTASSIUM SERPL-SCNC: 3.1 MMOL/L (ref 3.5–5.1)
RBC # BLD AUTO: 3.83 M/UL (ref 4.1–5.7)
RBC MORPH BLD: ABNORMAL
SODIUM SERPL-SCNC: 138 MMOL/L (ref 136–145)
WBC # BLD AUTO: 2.9 K/UL (ref 4.1–11.1)

## 2017-11-02 PROCEDURE — 65270000015 HC RM PRIVATE ONCOLOGY

## 2017-11-02 PROCEDURE — 74011250637 HC RX REV CODE- 250/637: Performed by: INTERNAL MEDICINE

## 2017-11-02 PROCEDURE — 74011250636 HC RX REV CODE- 250/636: Performed by: INTERNAL MEDICINE

## 2017-11-02 PROCEDURE — 80048 BASIC METABOLIC PNL TOTAL CA: CPT | Performed by: INTERNAL MEDICINE

## 2017-11-02 PROCEDURE — 74011636320 HC RX REV CODE- 636/320

## 2017-11-02 PROCEDURE — 36415 COLL VENOUS BLD VENIPUNCTURE: CPT | Performed by: INTERNAL MEDICINE

## 2017-11-02 PROCEDURE — 85025 COMPLETE CBC W/AUTO DIFF WBC: CPT | Performed by: INTERNAL MEDICINE

## 2017-11-02 RX ORDER — CHLORDIAZEPOXIDE HYDROCHLORIDE 5 MG/1
10 CAPSULE, GELATIN COATED ORAL 3 TIMES DAILY
Status: DISCONTINUED | OUTPATIENT
Start: 2017-11-02 | End: 2017-11-03 | Stop reason: HOSPADM

## 2017-11-02 RX ORDER — POTASSIUM CHLORIDE 20 MEQ/1
40 TABLET, EXTENDED RELEASE ORAL
Status: COMPLETED | OUTPATIENT
Start: 2017-11-02 | End: 2017-11-02

## 2017-11-02 RX ADMIN — Medication 100 MG: at 09:03

## 2017-11-02 RX ADMIN — MUPIROCIN: 20 OINTMENT TOPICAL at 09:09

## 2017-11-02 RX ADMIN — Medication 10 ML: at 03:31

## 2017-11-02 RX ADMIN — CHLORDIAZEPOXIDE HYDROCHLORIDE 25 MG: 25 CAPSULE ORAL at 09:03

## 2017-11-02 RX ADMIN — ENOXAPARIN SODIUM 40 MG: 40 INJECTION SUBCUTANEOUS at 09:15

## 2017-11-02 RX ADMIN — HYDROCHLOROTHIAZIDE 12.5 MG: 25 TABLET ORAL at 09:04

## 2017-11-02 RX ADMIN — THERA TABS 1 TABLET: TAB at 09:03

## 2017-11-02 RX ADMIN — MUPIROCIN: 20 OINTMENT TOPICAL at 17:19

## 2017-11-02 RX ADMIN — LORAZEPAM 2 MG: 2 INJECTION INTRAMUSCULAR; INTRAVENOUS at 09:15

## 2017-11-02 RX ADMIN — FOLIC ACID 1 MG: 1 TABLET ORAL at 09:03

## 2017-11-02 RX ADMIN — LORAZEPAM 4 MG: 2 INJECTION INTRAMUSCULAR; INTRAVENOUS at 03:08

## 2017-11-02 RX ADMIN — Medication 10 ML: at 21:17

## 2017-11-02 RX ADMIN — POTASSIUM CHLORIDE 40 MEQ: 20 TABLET, EXTENDED RELEASE ORAL at 09:03

## 2017-11-02 RX ADMIN — CHLORDIAZEPOXIDE HYDROCHLORIDE 10 MG: 5 CAPSULE ORAL at 17:19

## 2017-11-02 RX ADMIN — CHLORDIAZEPOXIDE HYDROCHLORIDE 10 MG: 5 CAPSULE ORAL at 21:17

## 2017-11-02 NOTE — PROGRESS NOTES
Hospitalist Progress Note    NAME: Estella Doll   :  1955   MRN:  [id number]       Assessment / Plan:  Alcohol withdrawal  POA  Chronic Heavy Alcoholism  Fatty Liver/elevated LFTs POA due to above  Lactic Acidosis, suspect has had Seizure POA  HTN as part of DT  -CT head neg  -TBili 1.8 ,, Alcohol level 57 on admission  -EEG nl  -lactic acidosis resolved with IVF  -cont' librium and continue CIWA protocol  -weaning off precedex  - PO MVI, thiamine, folic acid  -seizure precaution  -agree with transferring out of CCU today  -PT/OT/CM consult    Hypokalemia  -replete, monitor    Accelerated HTN, resolved  H/o CVA  -clonidine prn for BP and agitation due to DT  -on  HCTZ at home          Code Status: Full  Surrogate Decision Maker: wife Lisbeth Marty # [phone]      DVT Prophylaxis: Sq lovenox  GI Prophylaxis: IV pepcid    Baseline: Pt is independent at home with wife     Subjective:     Chief Complaint / Reason for Physician Visit  Pt seen at bedside, no acute complaints. Discussed with RN events overnight. Review of Systems:  Symptom Y/N Comments  Symptom Y/N Comments   Fever/Chills n   Chest Pain n    Poor Appetite    Edema     Cough    Abdominal Pain n    Sputum    Joint Pain     SOB/AMEZQUITA n   Pruritis/Rash     Nausea/vomit    Tolerating PT/OT y    Diarrhea    Tolerating Diet     Constipation    Other       Could NOT obtain due to:      Objective:     VITALS:   Last 24hrs VS reviewed since prior progress note.  Most recent are:  Patient Vitals for the past 24 hrs:   Temp Pulse Resp BP SpO2   17 1050 98.2 °F (36.8 °C) (!) 109 20 150/89 97 %   17 0930 - (!) 111 28 - -   17 0800 98.4 °F (36.9 °C) 85 (!) 32 149/71 99 %   17 0700 - 78 21 97/82 98 %   17 0600 - 85 19 144/86 98 %   17 0500 - 82 29 (!) 138/93 98 %   17 0403 99.1 °F (37.3 °C) 80 24 139/90 100 %   17 0300 - 80 26 120/89 99 %   17 0200 - 88 27 118/80 98 %   17 0100 - 88 26 (!) 133/93 99 %   11/02/17 0001 98.9 °F (37.2 °C) 79 20 (!) 156/91 99 %   11/01/17 2300 - 88 30 136/61 100 %   11/01/17 2200 - 93 (!) 32 128/83 99 %   11/01/17 2100 - (!) 107 21 (!) 125/109 98 %   11/01/17 2000 - (!) 118 25 (!) 148/98 96 %   11/01/17 1900 - 96 (!) 34 (!) 144/91 96 %   11/01/17 1804 - - - (!) 131/98 -   11/01/17 1800 - 95 22 - 99 %   11/01/17 1700 - 94 30 (!) 137/103 97 %   11/01/17 1603 - 78 21 (!) 150/100 99 %   11/01/17 1600 97.7 °F (36.5 °C) 76 (!) 31 - 99 %       Intake/Output Summary (Last 24 hours) at 11/02/17 1523  Last data filed at 11/02/17 0800   Gross per 24 hour   Intake              600 ml   Output              900 ml   Net             -300 ml        PHYSICAL EXAM:  General: Pt in bed, appears to be confused, tremulous   EENT:  EOMI. Anicteric sclerae. MMM  Resp:  CTA bilaterally, no wheezing or rales. No accessory muscle use  CV:  Tachycardic,  No edema  GI:  Soft, Non distended, Non tender.  +BS  Neurologic:  AAOx3, conversant, moving all exts. +tremors  Psych:   Not anxious nor agitated  Skin:  No rashes. No jaundice    Reviewed most current lab test results and cultures  YES  Reviewed most current radiology test results   YES  Review and summation of old records today    NO  Reviewed patient's current orders and MAR    YES  PMH/SH reviewed - no change compared to H&P  ________________________________________________________________________  Care Plan discussed with:    Comments   Patient x    Family      RN x    Care Manager     Consultant                        Multidiciplinary team rounds were held today with , nursing, pharmacist and clinical coordinator. Patient's plan of care was discussed; medications were reviewed and discharge planning was addressed.      ________________________________________________________________________  Total NON critical care TIME:  35 Minutes    Total CRITICAL CARE TIME Spent:  Minutes non procedure based      Comments   >50% of visit spent in counseling and coordination of care     ________________________________________________________________________  Mel Gillette MD     Procedures: see electronic medical records for all procedures/Xrays and details which were not copied into this note but were reviewed prior to creation of Plan. LABS:  I reviewed today's most current labs and imaging studies.   Pertinent labs include:  Recent Labs      11/02/17 0319 11/01/17   0331   WBC  2.9*  2.9*   HGB  12.6  12.7   HCT  38.7  37.9   PLT  116*  141*     Recent Labs      11/02/17 0319 11/01/17   0428  10/31/17   0520   NA  138  139  140   K  3.1*  3.5  3.8   CL  103  107  108   CO2  27  24  24   GLU  87  97  97   BUN  8  3*  1*   CREA  0.73  0.53*  0.62*   CA  8.8  9.0  8.2*   ALB   --    --   3.6   TBILI   --    --   2.5*   SGOT   --    --   58*   ALT   --    --   39       Signed: Mel Gillette MD

## 2017-11-02 NOTE — PROGRESS NOTES
TRANSFER - OUT REPORT:    Verbal report given to Francesca RN (name) on Ike Durant  being transferred to Oncology (unit) for routine progression of care       Report consisted of patients Situation, Background, Assessment and   Recommendations(SBAR). Information from the following report(s) SBAR, Kardex, ED Summary, Procedure Summary, Intake/Output, MAR, Accordion, Recent Results and Med Rec Status was reviewed with the receiving nurse. Lines:   Peripheral IV 10/31/17 Right Arm (Active)   Site Assessment Clean, dry, & intact 11/2/2017  8:00 AM   Phlebitis Assessment 0 11/2/2017  8:00 AM   Infiltration Assessment 0 11/2/2017  8:00 AM   Dressing Status Clean, dry, & intact 11/2/2017  8:00 AM   Dressing Type Tape;Transparent 11/2/2017  8:00 AM   Hub Color/Line Status Green 11/2/2017  8:00 AM        Opportunity for questions and clarification was provided.       Patient transported with:   [employer]

## 2017-11-02 NOTE — PROGRESS NOTES
1900 Report received from Tim Smith RN.     0630 Pt calm & cooperative throughout night. Ativan given x2 due to periods of restlessness, slight agitation. VSS, no complaints of pain. Precedex remains off. Sitter d/c'd.     0700 Report given to Berry Vaughn RN.

## 2017-11-02 NOTE — PROGRESS NOTES
Problem: Falls - Risk of  Goal: *Absence of Falls  Document Royal Fall Risk and appropriate interventions in the flowsheet.    Outcome: Progressing Towards Goal  Fall Risk Interventions:  Mobility Interventions: Bed/chair exit alarm    Mentation Interventions: Adequate sleep, hydration, pain control    Medication Interventions: Bed/chair exit alarm    Elimination Interventions: Call light in reach, Patient to call for help with toileting needs

## 2017-11-02 NOTE — PROGRESS NOTES
TRANSFER - IN REPORT:    Verbal report received from Lj(name) on Cecilia Shutter  being received from CCU(unit) for routine progression of care      Report consisted of patients Situation, Background, Assessment and   Recommendations(SBAR). Information from the following report(s) SBAR, Kardex, Intake/Output, MAR and Recent Results was reviewed with the receiving nurse. Opportunity for questions and clarification was provided. Assessment completed upon patients arrival to unit and care assumed.

## 2017-11-02 NOTE — PROGRESS NOTES
11/2/2017    INTENSIVIST PROGRESS NOTE:     Patient seen and evaluated. Markedly improved today. Off Precedex, only needed 2 doses of lorazepam.  He is calm, oriented and conversant.     Visit Vitals    BP 97/82    Pulse 78    Temp 99.1 °F (37.3 °C)    Resp 21    Ht 5' 9\" (1.753 m)    Wt 77.1 kg (170 lb)    SpO2 98%    BMI 25.1 kg/m2     GEN: NAD   CV: RRR  Lungs: CTA B    Labs reviewed    A/P:  - acute alcohol withdrawal - markedly improved today, continue scheduled and PRN benzodiazepines and wean as tolerated, discussed getting help after discharge  - alcoholism - thiamine, MVI, folate  - HTN - antihypertensives as needed  - replete electrolytes  - DVT prophyaxis  - transfer to floor  Romeo Kang MD

## 2017-11-02 NOTE — PROGRESS NOTES
Oncology Interdisciplinary rounds were held today to discuss patient plan of care and outcomes. The following members were present: Nursing and Case Management. Plan of Care: monitor DT's, PT/OT    Discharge Disposition: rehab?  Vs home tomorrow 11/3

## 2017-11-02 NOTE — PROGRESS NOTES
Patient arrived to the unit via wheelchair. Patient is A&O times three. Patient denies pain at the present time. Patient is unsteady on his feet, bed alarm applied to the patients bed for his safety. Patients call bell within reach of patient.      Primary Nurse Kate Xiong RN and Tamia Velasquez RN performed a dual skin assessment on this patient No impairment noted  Robinson score is 19

## 2017-11-03 VITALS
TEMPERATURE: 99 F | RESPIRATION RATE: 18 BRPM | DIASTOLIC BLOOD PRESSURE: 102 MMHG | BODY MASS INDEX: 25.18 KG/M2 | HEART RATE: 106 BPM | OXYGEN SATURATION: 99 % | HEIGHT: 69 IN | SYSTOLIC BLOOD PRESSURE: 148 MMHG | WEIGHT: 170 LBS

## 2017-11-03 PROCEDURE — 74011250636 HC RX REV CODE- 250/636: Performed by: INTERNAL MEDICINE

## 2017-11-03 PROCEDURE — 74011250637 HC RX REV CODE- 250/637: Performed by: INTERNAL MEDICINE

## 2017-11-03 RX ORDER — POTASSIUM CHLORIDE 750 MG/1
40 TABLET, FILM COATED, EXTENDED RELEASE ORAL
Status: DISCONTINUED | OUTPATIENT
Start: 2017-11-03 | End: 2017-11-03

## 2017-11-03 RX ORDER — LORAZEPAM 0.5 MG/1
1 TABLET ORAL
Qty: 9 TAB | Refills: 0 | Status: SHIPPED | OUTPATIENT
Start: 2017-11-03 | End: 2018-08-17

## 2017-11-03 RX ORDER — THERA TABS 400 MCG
1 TAB ORAL DAILY
Qty: 10 TAB | Refills: 0 | Status: SHIPPED | OUTPATIENT
Start: 2017-11-04 | End: 2018-08-17

## 2017-11-03 RX ORDER — POTASSIUM CHLORIDE 750 MG/1
10 TABLET, FILM COATED, EXTENDED RELEASE ORAL DAILY
Status: DISCONTINUED | OUTPATIENT
Start: 2017-11-03 | End: 2017-11-03 | Stop reason: HOSPADM

## 2017-11-03 RX ORDER — FOLIC ACID 1 MG/1
1 TABLET ORAL DAILY
Qty: 10 TAB | Refills: 0 | Status: SHIPPED | OUTPATIENT
Start: 2017-11-04 | End: 2018-08-17

## 2017-11-03 RX ORDER — LANOLIN ALCOHOL/MO/W.PET/CERES
100 CREAM (GRAM) TOPICAL DAILY
Qty: 10 TAB | Refills: 0 | Status: SHIPPED | OUTPATIENT
Start: 2017-11-04 | End: 2018-08-17

## 2017-11-03 RX ORDER — POTASSIUM CHLORIDE 750 MG/1
10 TABLET, FILM COATED, EXTENDED RELEASE ORAL DAILY
Qty: 5 TAB | Refills: 0 | Status: SHIPPED | OUTPATIENT
Start: 2017-11-03 | End: 2018-08-17

## 2017-11-03 RX ADMIN — MUPIROCIN: 20 OINTMENT TOPICAL at 08:36

## 2017-11-03 RX ADMIN — CHLORDIAZEPOXIDE HYDROCHLORIDE 10 MG: 5 CAPSULE ORAL at 08:28

## 2017-11-03 RX ADMIN — Medication 100 MG: at 08:28

## 2017-11-03 RX ADMIN — POTASSIUM CHLORIDE 10 MEQ: 750 TABLET, FILM COATED, EXTENDED RELEASE ORAL at 11:11

## 2017-11-03 RX ADMIN — FOLIC ACID 1 MG: 1 TABLET ORAL at 08:28

## 2017-11-03 RX ADMIN — HYDROCHLOROTHIAZIDE 12.5 MG: 25 TABLET ORAL at 08:28

## 2017-11-03 RX ADMIN — ENOXAPARIN SODIUM 40 MG: 40 INJECTION SUBCUTANEOUS at 10:00

## 2017-11-03 RX ADMIN — Medication 10 ML: at 06:37

## 2017-11-03 RX ADMIN — THERA TABS 1 TABLET: TAB at 08:28

## 2017-11-03 NOTE — PROGRESS NOTES
Spiritual Care Partner Volunteer visited patient in [address] [place] [address] on 11/3/17. Documented by:  Ulysses Milroy, M.Div.    Paging Service [phone])

## 2017-11-03 NOTE — PROGRESS NOTES
Problem: Pressure Injury - Risk of  Goal: *Prevention of pressure ulcer  Outcome: Resolved/Met Date Met: 11/03/17  No pressure areas noted    Problem: Falls - Risk of  Goal: *Absence of Falls  Document Ryoal Fall Risk and appropriate interventions in the flowsheet.    Outcome: Resolved/Met Date Met: 11/03/17  Fall Risk Interventions:  Mobility Interventions: Bed/chair exit alarm    Mentation Interventions: Bed/chair exit alarm    Medication Interventions: Bed/chair exit alarm    Elimination Interventions: Call light in reach

## 2017-11-03 NOTE — PROGRESS NOTES
Discharge instructions reviewed with the patient. The wife at the bedside both able to verbalize an understanding. The prescriptions given to the wife. The patient left via wheelchair and all personal belongings with the patient.

## 2017-11-03 NOTE — DISCHARGE SUMMARY
Hospitalist Discharge Summary     Patient ID:  Esther Mixon  [id number]  81 y.o.  1955    PCP on record: Parveen Nguyen MD    Admit date: 10/29/2017  Discharge date and time: 11/3/2017      DISCHARGE DIAGNOSIS:    Alcohol withdrawal  POA  Chronic Heavy Alcoholism  Fatty Liver/elevated LFTs POA due to above  Lactic Acidosis, suspect has had Seizure POA resolved  HTN as part of DT  Accelerated HTN  H/o CVA  hypokalemia                  CONSULTATIONS:  None    Excerpted HPI from H&P of Wilber Laird MD:  Esther Mixon is a 58 y.o.  male who presents brought in by wife concerned for impeding Seizures.     He has known history of Heavy ETOH and was recently admitted to [facility]  In Sept for DT's. Wife states he has been excessively drinking with multiple alcohol bottes in the house. Today he is so tremolous so he was brought  To Ed where he was intoxicated, confused and unable to contribute to history     We were asked to admit for work up and evaluation of the above problems. ______________________________________________________________________  DISCHARGE SUMMARY/HOSPITAL COURSE:  for full details see H&P, daily progress notes, labs, consult notes. Alcohol withdrawal  POA resolved   Chronic Heavy Alcoholism  Fatty Liver/elevated LFTs POA due to above  Lactic Acidosis, suspect has had Seizure POA  HTN as part of DT  CT head neg  TBili= 1.8 AST  122,, Alcohol level 57      Admit to PCU bed  Load with IV Ativan 2mg x 3  prn as per the CIWA scoring protocol  IV ativan prn seizures only  Goody bag x 1 in ER, cont PO FA, B12, Thiamine, alternate with D5NS with K  MG 2 gms Now, then Follow levels  Seizure precautions, Follow Lactate  CM consult for - OP alcohol rehab options on discharge   Cessation counseling given in ER- pt seems to be motivated to quit this time  Patient remained sbale with tremors  and on dc was sent with Ativan prn only. pt aware of side effects  Accelerated HTN  H/o CVA on no asa , pt will d/w with pcp  On  HCTZ at home     hypokalemia was replaced prn pt refused further labs after 11/2             _______________________________________________________________________  Patient seen and examined by me on discharge day. Pertinent Findings:  Gen:    Not in distress  Chest: Clear lungs  CVS:   Regular rhythm. No edema  Abd:  Soft, not distended, not tender  Neuro:  Alert, ox 3 no tremors  _______________________________________________________________________  DISCHARGE MEDICATIONS:   Current Discharge Medication List      START taking these medications    Details   folic acid (FOLVITE) 1 mg tablet Take 1 Tab by mouth daily. Qty: 10 Tab, Refills: 0      potassium chloride SR (KLOR-CON 10) 10 mEq tablet Take 1 Tab by mouth daily. Qty: 5 Tab, Refills: 0      therapeutic multivitamin (THERAGRAN) tablet Take 1 Tab by mouth daily. Qty: 10 Tab, Refills: 0      thiamine (B-1) 100 mg tablet Take 1 Tab by mouth daily. Qty: 10 Tab, Refills: 0      LORazepam (ATIVAN) 0.5 mg tablet Take 2 Tabs by mouth every eight (8) hours as needed for Anxiety. Max Daily Amount: 3 mg. Qty: 9 Tab, Refills: 0         CONTINUE these medications which have NOT CHANGED    Details   hydroCHLOROthiazide (HYDRODIURIL) 12.5 mg tablet Take 1 Tab by mouth daily. Qty: 30 Tab, Refills: 1             My Recommended Diet, Activity, Wound Care, and follow-up labs are listed in the patient's Discharge Insturctions which I have personally completed and reviewed.     _______________________________________________________________________  DISPOSITION:    Home with Family: x   Home with HH/PT/OT/RN:    SNF/LTC:    CLAUDIO:    OTHER:        Condition at Discharge:  Stable  _______________________________________________________________________  Follow up with:   PCP : Amber Hunt MD  Follow-up Information     Follow up With Details Comments Sirnivasa Davis MD Go on 11/7/2017 You have a hospital follow-up appointment with Dr. Gorge Tam on 11/7/2017 at 1:30pm.   [address]    [address] [address] [phone]                Total time in minutes spent coordinating this discharge (includes going over instructions, follow-up, prescriptions, and preparing report for sign off to her PCP) :  30 minutes    Signed:  Erin Naik MD

## 2017-11-03 NOTE — PROGRESS NOTES
0300 Pt continues to get OOB without ringing call bell telesitter in room . Pt easily angered with bed alarm and that we assist with ambulation to bathroom ,but he is unsteady . He did also refuse am labs ,I tried several times but he continues to refuse . CIWA slightly when awake .  0600 Pt continues to get OOB to shave this time ,without ringing call bell ,and he remains unsteady when standing up . He gets agitated saying \" this is like a USP here\" ,\"get that alarm off my bed \" reassured that we are only looking out for his safety . Telesitter remains on and bed alarm also.   0730 Call out to Dr Dixie Mcneill to notify him that pt refused am labs ,awaiting call back ,bedside report given to [address] she is aware call is out to Dr Dixie Mcneill

## 2017-11-03 NOTE — PROGRESS NOTES
57 yo male w/ Hx of chronic alcoholism and HTN ready for discharge. Patient states he has been sober in the past and sees a private counselor and PCP - Dr. Giovany Bustillos. He plans to return to his private home w/ his wife - wife states his \"drinking partner\" has moved from next door and she is keeping patient away from cash flow so he will be unable to purchase alcohol. He does not have access to a car. Wife will take patient to PCP and counseling. Patient states \"I stopped drinking when I came into the hospital and don't plan to restart\". He was provided multiple inpatient and outpatient ETOH resources to use as needed. PCP appointment on AVS.    Care Management Interventions  PCP Verified by CM: Yes (Dr. Giovany Bustillos)  Last Visit to PCP: 10/06/17  Mode of Transport at Discharge:  Other (see comment) (Wife - private vehicle)  Transition of Care Consult (CM Consult): Discharge Planning (Wife has scheduled private counseling appointment for patient)  MyChart Signup: No  Discharge Durable Medical Equipment: No  Physical Therapy Consult: Yes  Occupational Therapy Consult: Yes  Speech Therapy Consult: No  Current Support Network: Lives with Spouse, Own Home  Confirm Follow Up Transport: Family  Discharge Location  Discharge Placement: Home with family assistance    Danielle Masters RN, BSN, ACM   - Medical Oncology  [phone]

## 2018-08-17 RX ORDER — NAPROXEN SODIUM 220 MG
220 TABLET ORAL AS NEEDED
COMMUNITY

## 2018-08-20 ENCOUNTER — HOSPITAL ENCOUNTER (OUTPATIENT)
Age: 63
Setting detail: OUTPATIENT SURGERY
Discharge: HOME | End: 2018-08-20
Attending: INTERNAL MEDICINE | Admitting: INTERNAL MEDICINE
Payer: COMMERCIAL

## 2018-08-20 ENCOUNTER — ANESTHESIA (OUTPATIENT)
Dept: ENDOSCOPY | Age: 63
End: 2018-08-20
Payer: COMMERCIAL

## 2018-08-20 ENCOUNTER — ANESTHESIA EVENT (OUTPATIENT)
Dept: ENDOSCOPY | Age: 63
End: 2018-08-20
Payer: COMMERCIAL

## 2018-08-20 VITALS
DIASTOLIC BLOOD PRESSURE: 98 MMHG | WEIGHT: 194.06 LBS | SYSTOLIC BLOOD PRESSURE: 142 MMHG | HEART RATE: 66 BPM | HEIGHT: 69 IN | BODY MASS INDEX: 28.74 KG/M2 | OXYGEN SATURATION: 97 % | TEMPERATURE: 97.7 F | RESPIRATION RATE: 20 BRPM

## 2018-08-20 PROCEDURE — 74011250636 HC RX REV CODE- 250/636: Performed by: INTERNAL MEDICINE

## 2018-08-20 PROCEDURE — 76060000031 HC ANESTHESIA FIRST 0.5 HR: Performed by: INTERNAL MEDICINE

## 2018-08-20 PROCEDURE — 74011250637 HC RX REV CODE- 250/637: Performed by: INTERNAL MEDICINE

## 2018-08-20 PROCEDURE — 76040000019: Performed by: INTERNAL MEDICINE

## 2018-08-20 PROCEDURE — 74011250636 HC RX REV CODE- 250/636

## 2018-08-20 RX ORDER — MIDAZOLAM HYDROCHLORIDE 1 MG/ML
1-2 INJECTION, SOLUTION INTRAMUSCULAR; INTRAVENOUS
Status: DISCONTINUED | OUTPATIENT
Start: 2018-08-20 | End: 2018-08-20 | Stop reason: HOSPADM

## 2018-08-20 RX ORDER — SODIUM CHLORIDE 9 MG/ML
100 INJECTION, SOLUTION INTRAVENOUS CONTINUOUS
Status: DISCONTINUED | OUTPATIENT
Start: 2018-08-20 | End: 2018-08-20 | Stop reason: HOSPADM

## 2018-08-20 RX ORDER — LIDOCAINE HYDROCHLORIDE 20 MG/ML
5 SOLUTION OROPHARYNGEAL AS NEEDED
Status: DISCONTINUED | OUTPATIENT
Start: 2018-08-20 | End: 2018-08-20 | Stop reason: HOSPADM

## 2018-08-20 RX ORDER — DIPHENHYDRAMINE HYDROCHLORIDE 50 MG/ML
50 INJECTION, SOLUTION INTRAMUSCULAR; INTRAVENOUS ONCE
Status: DISCONTINUED | OUTPATIENT
Start: 2018-08-20 | End: 2018-08-20 | Stop reason: HOSPADM

## 2018-08-20 RX ORDER — SODIUM CHLORIDE 9 MG/ML
INJECTION, SOLUTION INTRAVENOUS
Status: DISCONTINUED | OUTPATIENT
Start: 2018-08-20 | End: 2018-08-20 | Stop reason: HOSPADM

## 2018-08-20 RX ORDER — LIDOCAINE HYDROCHLORIDE 20 MG/ML
INJECTION, SOLUTION EPIDURAL; INFILTRATION; INTRACAUDAL; PERINEURAL AS NEEDED
Status: DISCONTINUED | OUTPATIENT
Start: 2018-08-20 | End: 2018-08-20 | Stop reason: HOSPADM

## 2018-08-20 RX ORDER — SODIUM CHLORIDE 0.9 % (FLUSH) 0.9 %
5-10 SYRINGE (ML) INJECTION AS NEEDED
Status: DISCONTINUED | OUTPATIENT
Start: 2018-08-20 | End: 2018-08-20 | Stop reason: HOSPADM

## 2018-08-20 RX ORDER — FLUMAZENIL 0.1 MG/ML
0.2 INJECTION INTRAVENOUS
Status: DISCONTINUED | OUTPATIENT
Start: 2018-08-20 | End: 2018-08-20 | Stop reason: HOSPADM

## 2018-08-20 RX ORDER — PROPOFOL 10 MG/ML
INJECTION, EMULSION INTRAVENOUS AS NEEDED
Status: DISCONTINUED | OUTPATIENT
Start: 2018-08-20 | End: 2018-08-20 | Stop reason: HOSPADM

## 2018-08-20 RX ORDER — SODIUM CHLORIDE 0.9 % (FLUSH) 0.9 %
5-10 SYRINGE (ML) INJECTION EVERY 8 HOURS
Status: DISCONTINUED | OUTPATIENT
Start: 2018-08-20 | End: 2018-08-20 | Stop reason: HOSPADM

## 2018-08-20 RX ORDER — NALOXONE HYDROCHLORIDE 0.4 MG/ML
0.4 INJECTION, SOLUTION INTRAMUSCULAR; INTRAVENOUS; SUBCUTANEOUS
Status: DISCONTINUED | OUTPATIENT
Start: 2018-08-20 | End: 2018-08-20 | Stop reason: HOSPADM

## 2018-08-20 RX ORDER — EPINEPHRINE 0.1 MG/ML
1 INJECTION INTRACARDIAC; INTRAVENOUS
Status: DISCONTINUED | OUTPATIENT
Start: 2018-08-20 | End: 2018-08-20 | Stop reason: HOSPADM

## 2018-08-20 RX ORDER — MIDAZOLAM HYDROCHLORIDE 1 MG/ML
5 INJECTION, SOLUTION INTRAMUSCULAR; INTRAVENOUS
Status: DISCONTINUED | OUTPATIENT
Start: 2018-08-20 | End: 2018-08-20 | Stop reason: HOSPADM

## 2018-08-20 RX ORDER — FENTANYL CITRATE 50 UG/ML
100 INJECTION, SOLUTION INTRAMUSCULAR; INTRAVENOUS ONCE
Status: DISCONTINUED | OUTPATIENT
Start: 2018-08-20 | End: 2018-08-20 | Stop reason: HOSPADM

## 2018-08-20 RX ORDER — LORAZEPAM 2 MG/ML
2 INJECTION INTRAMUSCULAR AS NEEDED
Status: DISCONTINUED | OUTPATIENT
Start: 2018-08-20 | End: 2018-08-20 | Stop reason: HOSPADM

## 2018-08-20 RX ORDER — DEXTROSE MONOHYDRATE AND SODIUM CHLORIDE 5; .9 G/100ML; G/100ML
100 INJECTION, SOLUTION INTRAVENOUS CONTINUOUS
Status: DISCONTINUED | OUTPATIENT
Start: 2018-08-20 | End: 2018-08-20 | Stop reason: HOSPADM

## 2018-08-20 RX ORDER — ATROPINE SULFATE 0.1 MG/ML
0.5 INJECTION INTRAVENOUS
Status: DISCONTINUED | OUTPATIENT
Start: 2018-08-20 | End: 2018-08-20 | Stop reason: HOSPADM

## 2018-08-20 RX ORDER — DEXTROMETHORPHAN/PSEUDOEPHED 2.5-7.5/.8
1.2 DROPS ORAL
Status: DISCONTINUED | OUTPATIENT
Start: 2018-08-20 | End: 2018-08-20 | Stop reason: HOSPADM

## 2018-08-20 RX ADMIN — PROPOFOL 20 MG: 10 INJECTION, EMULSION INTRAVENOUS at 09:08

## 2018-08-20 RX ADMIN — LIDOCAINE HYDROCHLORIDE 50 MG: 20 INJECTION, SOLUTION EPIDURAL; INFILTRATION; INTRACAUDAL; PERINEURAL at 09:04

## 2018-08-20 RX ADMIN — PROPOFOL 30 MG: 10 INJECTION, EMULSION INTRAVENOUS at 09:07

## 2018-08-20 RX ADMIN — PROPOFOL 20 MG: 10 INJECTION, EMULSION INTRAVENOUS at 09:05

## 2018-08-20 RX ADMIN — SODIUM CHLORIDE 100 ML/HR: 900 INJECTION, SOLUTION INTRAVENOUS at 08:40

## 2018-08-20 RX ADMIN — PROPOFOL 30 MG: 10 INJECTION, EMULSION INTRAVENOUS at 09:09

## 2018-08-20 RX ADMIN — SIMETHICONE 80 MG: 20 SUSPENSION/ DROPS ORAL at 09:08

## 2018-08-20 RX ADMIN — PROPOFOL 80 MG: 10 INJECTION, EMULSION INTRAVENOUS at 09:04

## 2018-08-20 RX ADMIN — PROPOFOL 30 MG: 10 INJECTION, EMULSION INTRAVENOUS at 09:10

## 2018-08-20 RX ADMIN — PROPOFOL 20 MG: 10 INJECTION, EMULSION INTRAVENOUS at 09:06

## 2018-08-20 RX ADMIN — PROPOFOL 30 MG: 10 INJECTION, EMULSION INTRAVENOUS at 09:13

## 2018-08-20 RX ADMIN — SODIUM CHLORIDE: 9 INJECTION, SOLUTION INTRAVENOUS at 08:40

## 2018-08-20 NOTE — IP AVS SNAPSHOT
[address] [facility] 
[phone] Patient: Chester Ralph MRN: [id number] [facility]:4/91/2302 About your hospitalization You were admitted on:  August 20, 2018 You last received care in the:  [facility] ENDOSCOPY You were discharged on:  August 20, 2018 Why you were hospitalized Your primary diagnosis was:  Not on File Follow-up Information Follow up With Details Comments Contact Info MD [address] 
 
[facility] [phone] 
[phone] Discharge Orders None A check rico indicates which time of day the medication should be taken. My Medications CONTINUE taking these medications Instructions Each Dose to Equal  
 Morning Noon Evening Bedtime ALEVE 220 mg tablet Generic drug:  naproxen sodium Your last dose was: Your next dose is: Take 220 mg by mouth as needed. 220 mg  
    
   
   
   
  
 FISH  mg Cap Generic drug:  Omega-3 Fatty Acids Your last dose was: Your next dose is: Take  by mouth daily. Discharge Instructions Endoscopy Discharge Instructions Dr. Laila Berry [place] office  NAME: Chester Ralph RECORD YLOGIH:[id number] AGE:  61 y.o. YOB: 1955 FINAL Discharge Procedure and Diagnosis:   
  
Procedure(s): 
COLONOSCOPY FINDINGS:    
Diverticulosis 
hemorrhoids MEDICATIONS [x] CONTINUE CURRENT MEDICATIONS [] NEW MEDICATIONS 1.  
 2.  
 3.  
   
 
Testing Schedule Colonoscopy Screening                                   Recommendations []  Repeat colonoscopy in 6-12 month 2nd        to Inadequate  prep  
 []  Repeat colonoscopy in 3 years  
 []  Repeat colonoscopy in 5 years  
 [x]  Repeat colonoscopy in 10 years New additional 
Tests Call the office  
([phone]) for the appointment time    
 []  
 
 []  
 
 []  
  
  
   
  
                     [address]:   
                                                                                                                 
 
 
    
 [x]   None follow up with pcp []  1 week     
 []   2 week  
 []  1 month Always keep Jenny Allen MD for regular medical follow up If you had a colonoscopy the \"C\" indicates specific instructions    
  
x                                           Diet Instructions : 
 Ordinarily you may resume your previous diet but your initial diet should be       Light your discharge nurse will go over this with you. Large meals can cause  abdominal discomfort after these procedures. Specific Diet Recommendations:  
     [x] High fiber diet. https://www.Careerflo/. com/diets/ 
 
    [] GERD diet: avoid fried and fatty foods, peppermint, chocolate, alcohol,    
          coffee, citrus fruits and juices, and tomato products. Avoid lying down for 
          2 to 3  hours after eating. https://www.klein.com/. com/diets/      
     []  FODMAP DIET  DeathUnit.nl      
 
     []  All diets eg high fiber, gastroparesis. , weight loss , gluten free 1. [employer] 2.  https://www.Fi.tt. com/diets/  
       
__x__  Pecola Bidding may feel quite tired and need to rest and recuperate for several hours    following these procedures. __x__  Due to the fact that sedation was administered for this procedure, do not drive,   operate machinery or sign legal documents for the next 24 hours. __x__  Mild abdominal pain may be experienced after your procedure, but is should   disappear after several hours. Notify your physician if you have persistent pain,   tenderness or abdominal distension. __x__  C Many patients for the first few hours following the exam may experience         belching or passing gas through the rectum. Walking may help to relieve      
 distention and gas pains. A warm bath or shower will often help with abdominal  cramping.                                                                                        
  
__x__   Zia Parnell may return to your normal routine tomorrow, according to how you feel        and depending on your doctors instructions. Be sure to call your doctor to make  an appointment for a post-surgery check-up on the date your doctor has   requested. __x__ C Rectal bleeding or spotting in small amounts may occur with the first bowel   movement following a colonoscopy or sigmoidoscopy. If a large amount of blood is noted call immediately __x__  Zia Parnell may experience a numbness or lack of sensation in throat. If present, do not   
 eat or drink. Before eating, test your ability to drink with small sips of water. Y     You may try clear liquids or soups. If you tolerate these, you may then eat solid     food which is not greasy or spicy. __x__ C   
 IF POLYPS REMOVED: Avoid any blood thinning medication such as plavix,   aspirin or coumadin  NSAIDS (like advil or alleve) for 7 days. __x__  Notify your physician if you cough or vomit blood or experience chest pain. Your biopsy or testing result should be available in 7-10 days Prescription will be electronically sent to your pharmacy you must  
  let your nurse know your pharmacy:  
                                                                                                                               
 
     [address] TO HELP ENSURE A SMOOTH RECOVERY,  
    IT IS IMPORTANT TO FOLLOW THEM. _x___Pamphlet /Educational Information provided for diagnostic findings Additional education information can assessed at the sites below: 
 [place] 
 http://www.digestive. niddk.nih.gov/ddiseases/a-z.asp Web MD patient information Signature of individual given instructions : 
 Date: 2018 [employer] Activation Thank you for requesting access to [employer]. Please follow the instructions below to securely access and download your online medical record. [employer] allows you to send messages to your doctor, view your test results, renew your prescriptions, schedule appointments, and more. How Do I Sign Up? 1. In your internet browser, go to [email or web address] 
2. Click on the First Time User? Click Here link in the Sign In box. You will be redirect to the New Member Sign Up page. 3. Enter your [employer] Access Code exactly as it appears below. You will not need to use this code after youve completed the sign-up process. If you do not sign up before the expiration date, you must request a new code. [employer] Access Code: [email or web address] Expires: 2018  7:57 AM (This is the date your [employer] access code will ) 4. Enter the last four digits of your Social Security Number (xxxx) and Date of Birth (mm/dd/yyyy) as indicated and click Submit. You will be taken to the next sign-up page. 5. Create a sickweather ID. This will be your sickweather login ID and cannot be changed, so think of one that is secure and easy to remember. 6. Create a sickweather password. You can change your password at any time. 7. Enter your Password Reset Question and Answer. This can be used at a later time if you forget your password. 8. Enter your e-mail address. You will receive e-mail notification when new information is available in [address] 9. Click Sign Up. You can now view and download portions of your medical record. 10. Click the Download Summary menu link to download a portable copy of your medical information. Additional Information If you have questions, please visit the Frequently Asked Questions section of the sickweather website at [email or web address] Remember, sickweather is NOT to be used for urgent needs. For medical emergencies, dial 911. Introducing [facility] [place] [facility]! [facility] introduces sickweather patient portal. Now you can access parts of your medical record, email your doctor's office, and request medication refills online. 1. In your internet browser, go to [email or web address] 2. Click on the First Time User? Click Here link in the Sign In box. You will see the New Member Sign Up page. 3. Enter your sickweather Access Code exactly as it appears below. You will not need to use this code after youve completed the sign-up process. If you do not sign up before the expiration date, you must request a new code. · [employer]weather Access Code: [email or web address] Expires: 11/18/2018  7:57 AM 
 
4. Enter the last four digits of your Social Security Number (xxxx) and Date of Birth (mm/dd/yyyy) as indicated and click Submit.  You will be taken to the next sign-up page. 5. Create a Citus Datat ID. This will be your Visualnest login ID and cannot be changed, so think of one that is secure and easy to remember. 6. Create a [employer] password. You can change your password at any time. 7. Enter your Password Reset Question and Answer. This can be used at a later time if you forget your password. 8. Enter your e-mail address. You will receive e-mail notification when new information is available in [address] 9. Click Sign Up. You can now view and download portions of your medical record. 10. Click the Download Summary menu link to download a portable copy of your medical information. If you have questions, please visit the Frequently Asked Questions section of the Visualnest website. Remember, Visual[employer] is NOT to be used for urgent needs. For medical emergencies, dial 911. Now available from your iPhone and Android! Introducing Kyle Huber As a [facility] patient, I wanted to make you aware of our electronic visit tool called Kyle Huber. [facility] 24/7 allows you to connect within minutes with a medical provider 24 hours a day, seven days a week via a mobile device or tablet or logging into a secure website from your computer. You can access Kyle Huber from anywhere in the [place]. A virtual visit might be right for you when you have a simple condition and feel like you just dont want to get out of bed, or cant get away from work for an appointment, when your regular [facility] provider is not available (evenings, weekends or holidays), or when youre out of town and need minor care. Electronic visits cost only $49 and if the [facility] 24/7 provider determines a prescription is needed to treat your condition, one can be electronically transmitted to a nearby pharmacy*. Please take a moment to enroll today if you have not already done so.   The enrollment process is free and takes just a few minutes. To enroll, please download the Intelligent Apps (mytaxi) 24/7 vesna to your tablet or phone, or visit [email or web address] to enroll on your computer. And, as an [address] [place] [address] patient with a Vir[employer]ch account, the results of your visits will be scanned into your electronic medical record and your primary care provider will be able to view the scanned results. We urge you to continue to see your regular [employer] Apps (mytaxi) provider for your ongoing medical care. And while your primary care provider may not be the one available when you seek a Enterra Feed virtual visit, the peace of mind you get from getting a real diagnosis real time can be priceless. For more information on Enterra Feed, view our Frequently Asked Questions (FAQs) at [email or web address]. Sincerely, 
 
Julio Cesar Jacobson MD 
Chief Medical Officer Jone Rivera *:  certain medications cannot be prescribed via Enterra Feed Providers Seen During Your Hospitalization Provider Specialty Primary office phone Ivan Soler MD Internal Medicine [phone] Your Primary Care Physician (PCP) Primary Care Physician Office Phone Office Fax Erika JAMES [phone] You are allergic to the following Allergen Reactions Lisinopril Angioedema Recent Documentation Height Weight BMI Smoking Status 1.753 m 88 kg 28.66 kg/m2 Never Smoker Emergency Contacts Name Discharge Info Relation Home Work Mobile [address] CAREGIVER [3] Spouse [3]  [phone] Patient Belongings The following personal items are in your possession at time of discharge: 
  Dental Appliances: Partials, Uppers, With patient  Visual Aid: None Please provide this summary of care documentation to your next provider. Signatures-by signing, you are acknowledging that this After Visit Summary has been reviewed with you and you have received a copy. Patient Signature:  ____________________________________________________________ Date:  ____________________________________________________________  
  
Daniella Kera Provider Signature:  ____________________________________________________________ Date:  ____________________________________________________________

## 2018-08-20 NOTE — ANESTHESIA POSTPROCEDURE EVALUATION
Post-Anesthesia Evaluation and Assessment    Patient: Do Salamanca MRN: [id number]  SSN: [id number]    YOB: 1955  Age: 61 y.o. Sex: male       Cardiovascular Function/Vital Signs  Visit Vitals    BP (!) 142/98    Pulse 66    Temp 36.5 °C (97.7 °F)    Resp 20    Ht 5' 9\" (1.753 m)    Wt 88 kg (194 lb 1 oz)    SpO2 97%    BMI 28.66 kg/m2       Patient is status post total IV anesthesia anesthesia for Procedure(s):  COLONOSCOPY. Nausea/Vomiting: None    Postoperative hydration reviewed and adequate. Pain:  Pain Scale 1: Numeric (0 - 10) (08/20/18 0951)  Pain Intensity 1: 0 (08/20/18 0951)   Managed    Neurological Status: At baseline    Mental Status and Level of Consciousness: Arousable    Pulmonary Status:   O2 Device: Room air (08/20/18 0951)   Adequate oxygenation and airway patent    Complications related to anesthesia: None    Post-anesthesia assessment completed.  No concerns    Signed By: Silverio Fisher MD     August 20, 2018

## 2018-08-20 NOTE — PERIOP NOTES
Anesthesia reports 260mg Propofol, 50mg Lidocaine and 300mL NS given during procedure. Received report from anesthesia staff on vital signs and status of patient.

## 2018-08-20 NOTE — PROCEDURES
G I Procedure Note              COLONOSCOPY   Dr. Jose Riley office   [address]                                   [id number]                                  [id number]   1955                                      61 y.o.                                    male      Procedure Date: 8/20/2018                                                                                                              Pre Op Diagnosis:                     1. SCREENING                                                                                                                                                                        Post Op Diagnosis:                    1.   Diverticulosis                                                         2. Internal hemorrhoids    3. H&p completed: Yes            Anesthesia Assessment: Performed prior to procedure:      No change  Anesthesia Plan: Performed prior to procedure:                   No change       Medications: See Reviewed List and Reconcilation           Informed consent was obtained     Risk Statement:  Prior to the procedure the risks were explained to the patient and/or to the family including but not limited to perforation, bleeding, adverse drug reaction, aspiration, and even the need for possible surgery. A colonoscopy exam is not 100% accurate which may be related to preparation or blind spots during the exam.The possibility that an abnormality and /or cancer could be missed was also discussed as well as alternative x-ray options. Instrument:    Olympus adult Videocolonoscope                                   Immediate Procedure Reassessment Completed     With the patient in the left lateral position, a rectal examination was performed and the findings were:negative, stool guaiac negative.   The Olympus Video colonoscope was inserted under direct vision into the rectum. The colonoscope was passed from the rectum to the cecum, which was identified by the ileocecal valve. The colon findings demonstrated:    ANUS: Anal exam reveals no masses or hemorrhoids, sphincter tone is normal.     RECTUM: Rectal exam reveals no masses or hemorrhoids. SIGMOID COLON: The patient was noted to have diverticulosis. The mucosa is normal with good vascular pattern and without ulcers or polyps. DESCENDING COLON:  The mucosa is normal with good vascular pattern and without ulcers or polyps. SPLENIC FLEXURE: The splenic flexure is normal.     TRANSVERSE COLON: The mucosa is normal with good vascular pattern and without ulcers or polyps. HEPATIC FLEXURE: The hepatic flexure is normal.     ASCENDING COLON: The mucosa is normal with good vascular pattern and without ulcers or polyps. CECUM:  The ileocecal valve appears normal.     Scatttered diverticulosis was noted. The colonoscope was slowly withdrawn >6 minute period and the instrument was retroflexed in the rectum. The rectal findings were:Protruding lesions:     -Internal Hemorrhoids  The patient tolerated the entire procedure well. Blood Loss minimal nominal    Colon preparation was good    Recommendations:     - For colon cancer screening in this average-risk patient, colonoscopy may be repeated in 10 years.       Copies sent to   Heidi Amaral MD  CC:  Lesia Hines MD

## 2018-08-20 NOTE — ROUTINE PROCESS
Cecilia Roman  1955  [id number]    Situation:  Verbal report received from: [address] RN  Procedure: Procedure(s):  COLONOSCOPY    Background:    Preoperative diagnosis: SCREENING  Postoperative diagnosis: Diverticulosis    :  Dr. Leo Villa  Assistant(s): Endoscopy Technician-1: Araceli Singh  Endoscopy RN-1: Sylwia Longo RN    Specimens: * No specimens in log *  H. Pylori  no    Assessment:  Intra-procedure medications       Anesthesia gave intra-procedure sedation and medications, see anesthesia flow sheet yes    Intravenous fluids: NS@ KVO     Vital signs stable       Abdominal assessment: round and soft       Recommendation:  Discharge patient per MD order  .   Return to floor  Family or [place] Wife  Permission to share finding with family or friend yes

## 2018-08-20 NOTE — H&P
G I Procedure Note           Endoscopy History and Physical               Dr. Fontanez See Office [address] [place] [address] Office  [id number] [id number]  [id number]    1955  61 y.o.  male      Date of Procedure:   Preoperative Diagnosis:       Procedure:    8/20/2018           SCREENING                              Procedure(s):  COLONOSCOPY      Gastroenterologist:  Anesthesia:           Willard Neal MD                               MAC            History and procedure indication:  Ferdinand Davenport is a 61 y.o. BLACK OR  male who presents with: SCREENING   including the additional history of Screening ,Screening for colon cancer,,        Past Medical History:   Diagnosis Date    Alcoholism ([facility]     Hypertension     Ill-defined condition     Hernia     Seizures ([facility]     Withdrawal       Prior to Admission medications    Medication Sig Start Date End Date Taking? Authorizing Provider   naproxen sodium (ALEVE) 220 mg tablet Take 220 mg by mouth as needed. Yes Historical Provider   Omega-3 Fatty Acids (FISH OIL) 500 mg cap Take  by mouth daily. Yes Historical Provider     Allergies   Allergen Reactions    Lisinopril Angioedema       Past Surgical History:   Procedure Laterality Date    HX HERNIA REPAIR  1960's     Family History   Problem Relation Age of Onset    Hypertension Other     Breast Cancer Other     Cancer Mother      breast    No Known Problems Father     HIV/AIDS Brother     Alcohol abuse Brother       Social History   Substance Use Topics    Smoking status: Never Smoker    Smokeless tobacco: Never Used    Alcohol use Yes      Comment: 1/2-1 pint vodka/day +/- beer; no drinks since 11/2017                                                    PHYSICAL EXAM   There were no vitals taken for this visit.     General appearance:  alert, well appearing, and in no distress  Mental status:  normal mood, behavior, speech, dress, motor activity and thought processes  Nose:      normal and patent, no erythema, discharge or polyps  Mouth:- mucous membranes moist, pharynx normal without lesions                  [x]  No Loose teeth      []    Loose teeth  Finger opening:  []1     []1.5    [] 2     [] 2.5     [x] 3      [] 3.5     [] 4   Mallampati:         [] Class 1     [x] Class 2    [] Class 3      [] Class 4      Neck - supple,      [x] Full ROM [] Decreased ROM  [] Short Neck no significant adenopathy    Chest - clear to auscultation, no wheezes, rales or rhonchi, symmetric air entry  Heart: normal rate, regular rhythm, normal S1, S2, no murmurs, rubs, clicks or gallops  Abdomen: abdomen soft, bowel sounds  [x] normal  [] increased  [] hypoactive                       [] no tenderness  [] epigastric tenderness  [] LLQ tenderness   [] RLQ tenderness                      No masses, organomegaly or guarding. Rectal exam: negative without mass, lesions or tenderness  Extremities: peripheral pulses normal, no pedal edema, no clubbing or cyanosis  Neurologic: Alert and oriented to person, place, and time; normal strength and tone.                          Normal symmetric reflexes  Normal gait:                                      Assessement:                                 Pre op dx:  SCREENING   Additional medical problems list below   Patient Active Problem List   Diagnosis Code    Chronic alcoholism ([facility] [address] [facility] F10.20    ARF (acute renal failure) (HCC) N17.9    HTN (hypertension) I10    Hepatic steatosis K76.0    History of CVA (cerebrovascular accident) Z80.78    UTI (urinary tract infection) N39.0    Alcoholism /alcohol abuse ([facility] [facility] F10.20    Accelerated hypertension I10    Alcohol withdrawal seizure ([facility] [facility] F10.239, R56.9    Delirium tremens ([facility] [address] [facility] F10.231                                                                                         This note documentation was performed prior to this planned procedure       after a history and physical was performed in the office. Date: 7 24 18                   Pre Procedure Evaluation (per anesthesia or per h&p)                                                Sedation/Assessment:                                                                                               Mallampati Classification                            []Class 1                    []Class 2                    [] Class 3                  [] Class 4                                              ASA classfication         []     Class I: Normally healthy         []     Class II: Patient with mild systemic disease (e.g. hypertension)         []     Class III: Patient with severe systemic disease (e.g. CHF), non-decompensated         []     Class IV: Patient with severe systemic disease, decompensated         []     Class V: Moribund patient, survival unlikely                     Plan:  []  Egd                                 [x] Colonoscopy                               [] with Moderate Sedation /Conscious Sedation                                 [x] MAC          Patient stable for planned procedure. See orders.      Hollis Mtz MD

## 2018-08-20 NOTE — ANESTHESIA PREPROCEDURE EVALUATION
Anesthetic History   No history of anesthetic complications            Review of Systems / Medical History  Patient summary reviewed, nursing notes reviewed and pertinent labs reviewed    Pulmonary  Within defined limits                 Neuro/Psych   Within defined limits  seizures (alcohol withdrawl related): well controlled         Cardiovascular  Within defined limits  Hypertension: well controlled              Exercise tolerance: >4 METS  Comments:  EKG: ST rate 125, inferior infarct, ST and T abnormality consider lateral ischemia       ECHO: 65% EF with trivial MR and TR   GI/Hepatic/Renal  Within defined limits           Pertinent negatives: No liver disease (hepatic steatosis)  Comments: Screening colon Endo/Other  Within defined limits           Other Findings   Comments: Alcohol abuse: 1/2 to 1 pint a day, quit ; hx of DTs, alc withdrawal seizure         Physical Exam    Airway  Mallampati: II  TM Distance: > 6 cm  Neck ROM: normal range of motion   Mouth opening: Normal     Cardiovascular    Rhythm: regular  Rate: normal         Dental    Dentition: Full upper dentures and Poor dentition     Pulmonary  Breath sounds clear to auscultation               Abdominal  GI exam deferred       Other Findings            Anesthetic Plan    ASA: 2  Anesthesia type: total IV anesthesia          Induction: Intravenous  Anesthetic plan and risks discussed with: Patient

## 2018-08-20 NOTE — DISCHARGE INSTRUCTIONS
Endoscopy Discharge Instructions     Dr. Hunter Smith office                                            NAME: Ananya Thomas [id number]:[id number]    AGE:  61 y.o. YOB: 1955                                                              FINAL Discharge Procedure and Diagnosis:       Procedure(s):  COLONOSCOPY       FINDINGS:     Diverticulosis  hemorrhoids                                        MEDICATIONS    [x] CONTINUE CURRENT MEDICATIONS     [] NEW MEDICATIONS           1.    2.    3.         Testing   Schedule              Colonoscopy Screening                                   Recommendations       []  Repeat colonoscopy in 6-12 month 2nd        to Inadequate  prep    []  Repeat colonoscopy in 3 years    []  Repeat colonoscopy in 5 years    [x]  Repeat colonoscopy in 10 years         New additional  Tests  Call the office   ([phone]) for the appointment time      []      []      []                                     YOUR NEXT APPOINTMENT WITH DR Mayela Wiseman:                                                                                                                                [x]   None follow up with pcp   []  1 week       []   2 week    []  1 month    Always keep Nelida Seo MD for regular medical follow up                                                                                                                         If you had a colonoscopy the \"C\" indicates specific instructions        x                                           Diet Instructions :   Ordinarily you may resume your previous diet but your initial diet should be       Light your discharge nurse will go over this with you. Large meals can cause  abdominal discomfort after these procedures.                                                                            Specific Diet Recommendations: [x] High fiber diet. https://www.Payvment. com/diets/        [] GERD diet: avoid fried and fatty foods, peppermint, chocolate, alcohol,               coffee, citrus fruits and juices, and tomato products. Avoid lying down for            2 to 3  hours after eating. https://www.klein.com/. com/diets/            []  FODMAP DIET  DeathUnit.nl              []  All diets eg high fiber, gastroparesis. , weight loss , gluten free             1. NormOxys              2.  https://www.Payvment. Algorithmics/diets/           __x__  Seble Prince may feel quite tired and need to rest and recuperate for several hours    following these procedures. __x__  Due to the fact that sedation was administered for this procedure, do not drive,   operate machinery or sign legal documents for the next 24 hours. __x__  Mild abdominal pain may be experienced after your procedure, but is should   disappear after several hours. Notify your physician if you have persistent pain,   tenderness or abdominal distension. __x__  C    Many patients for the first few hours following the exam may experience         belching or passing gas through the rectum. Walking may help to relieve        distention and gas pains. A warm bath or shower will often help with abdominal  cramping.                                                                                            __x__   Seble Prince may return to your normal routine tomorrow, according to how you feel        and depending on your doctors instructions. Be sure to call your doctor to make  an appointment for a post-surgery check-up on the date your doctor has   requested. __x__ C     Rectal bleeding or spotting in small amounts may occur with the first bowel   movement following a colonoscopy or sigmoidoscopy.  If a large amount of blood is noted call immediately     __x__  You may experience a numbness or lack of sensation in throat. If present, do not     eat or drink. Before eating, test your ability to drink with small sips of water. Y     You may try clear liquids or soups. If you tolerate these, you may then eat solid     food which is not greasy or spicy. __x__ C     IF POLYPS REMOVED: Avoid any blood thinning medication such as plavix,   aspirin or coumadin  NSAIDS (like advil or alleve) for 7 days. __x__  Notify your physician if you cough or vomit blood or experience chest pain. Your biopsy or testing result should be available in 7-10 days                                                                                                                      Prescription will be electronically sent to your pharmacy you must     let your nurse know your pharmacy:                                                                                                                                          [address] [place] [address]. TO HELP ENSURE A SMOOTH RECOVERY,       IT IS IMPORTANT TO FOLLOW THEM. _x___Pamphlet /Educational Information provided for diagnostic findings     Additional education information can assessed at the sites below:   [place]   http://www.digestive. niddk.nih.gov/ddiseases/a-z.asp      Web MD patient information                                                                                                Signature of individual given instructions :   Date: 8/20/2018                                                                                                                              [employer] Activation    Thank you for requesting access to [employer]. Please follow the instructions below to securely access and download your online medical record.  [employer] allows you to send messages to your doctor, view your test results, renew your prescriptions, schedule appointments, and more. How Do I Sign Up? 1. In your internet browser, go to [email or web address]  2. Click on the First Time User? Click Here link in the Sign In box. You will be redirect to the New Member Sign Up page. 3. Enter your [employer] Access Code exactly as it appears below. You will not need to use this code after youve completed the sign-up process. If you do not sign up before the expiration date, you must request a new code. [employer] Access Code: [email or web address]  Expires: 2018  7:57 AM (This is the date your [employer] access code will )    4. Enter the last four digits of your Social Security Number (xxxx) and Date of Birth (mm/dd/yyyy) as indicated and click Submit. You will be taken to the next sign-up page. 5. Create a [employer]ky ID. This will be your [employer] login ID and cannot be changed, so think of one that is secure and easy to remember. 6. Create a [employer] password. You can change your password at any time. 7. Enter your Password Reset Question and Answer. This can be used at a later time if you forget your password. 8. Enter your e-mail address. You will receive e-mail notification when new information is available in [address] 9. Click Sign Up. You can now view and download portions of your medical record. 10. Click the Download Summary menu link to download a portable copy of your medical information. Additional Information    If you have questions, please visit the Frequently Asked Questions section of the [employer] website at [email or web address] Remember, [employer] is NOT to be used for urgent needs. For medical emergencies, dial 911.

## 2018-08-20 NOTE — PERIOP NOTES
Endoscope was pre-cleaned at the bedside immediately following procedure by Julio Cesar [place] FELIX

## 2018-12-03 ENCOUNTER — TELEPHONE (OUTPATIENT)
Dept: SURGERY | Age: 63
End: 2018-12-03

## 2019-12-19 NOTE — PROGRESS NOTES
Oncology Nursing Communication Tool      3:08 PM  11/2/2017     Bedside and Verbal shift change report given to Griselda Henderson RN (incoming nurse) by Nathanael Boas, RN (outgoing nurse) on Karie Tovar a 58 y.o. male who was admitted on 10/29/2017 11:44 PM. Report included the following information SBAR, Kardex, Intake/Output, MAR and Recent Results.          Significant changes during shift: bed alarm, tele-sitter      Issues for physician to address: none            Code Status: Full Code     Infections: No current active infections     Allergies: Lisinopril     Current diet: DIET REGULAR       Pain Controlled [x] yes [] no   Bowel Movement [] yes [x] no   Last Bowel Movement (date)     10/30/17            Vital Signs:   Patient Vitals for the past 12 hrs:   Temp Pulse Resp BP SpO2   11/02/17 1050 98.2 °F (36.8 °C) (!) 109 20 150/89 97 %   11/02/17 0930 - (!) 111 28 - -   11/02/17 0800 98.4 °F (36.9 °C) 85 (!) 32 149/71 99 %   11/02/17 0700 - 78 21 97/82 98 %   11/02/17 0600 - 85 19 144/86 98 %   11/02/17 0500 - 82 29 (!) 138/93 98 %   11/02/17 0403 99.1 °F (37.3 °C) 80 24 139/90 100 %      Intake & Output:     Intake/Output Summary (Last 24 hours) at 11/02/17 1508  Last data filed at 11/02/17 0800   Gross per 24 hour   Intake              600 ml   Output              900 ml   Net             -300 ml      Laboratory Results:     Recent Results (from the past 12 hour(s))   METABOLIC PANEL, BASIC    Collection Time: 11/02/17  3:19 AM   Result Value Ref Range    Sodium 138 136 - 145 mmol/L    Potassium 3.1 (L) 3.5 - 5.1 mmol/L    Chloride 103 97 - 108 mmol/L    CO2 27 21 - 32 mmol/L    Anion gap 8 5 - 15 mmol/L    Glucose 87 65 - 100 mg/dL    BUN 8 6 - 20 MG/DL    Creatinine 0.73 0.70 - 1.30 MG/DL    BUN/Creatinine ratio 11 (L) 12 - 20      GFR est AA >60 >60 ml/min/1.73m2    GFR est non-AA >60 >60 ml/min/1.73m2    Calcium 8.8 8.5 - 10.1 MG/DL   CBC WITH AUTOMATED DIFF    Collection Time: 11/02/17  3:19 AM Result Value Ref Range    WBC 2.9 (L) 4.1 - 11.1 K/uL    RBC 3.83 (L) 4.10 - 5.70 M/uL    HGB 12.6 12.1 - 17.0 g/dL    HCT 38.7 36.6 - 50.3 %    .0 (H) 80.0 - 99.0 FL    MCH 32.9 26.0 - 34.0 PG    MCHC 32.6 30.0 - 36.5 g/dL    RDW 12.5 11.5 - 14.5 %    PLATELET 596 (L) 220 - 400 K/uL    NEUTROPHILS 47 32 - 75 %    LYMPHOCYTES 29 12 - 49 %    MONOCYTES 21 (H) 5 - 13 %    EOSINOPHILS 2 0 - 7 %    BASOPHILS 1 0 - 1 %    ABS. NEUTROPHILS 1.4 (L) 1.8 - 8.0 K/UL    ABS. LYMPHOCYTES 0.8 0.8 - 3.5 K/UL    ABS. MONOCYTES 0.6 0.0 - 1.0 K/UL    ABS. EOSINOPHILS 0.1 0.0 - 0.4 K/UL    ABS. BASOPHILS 0.0 0.0 - 0.1 K/UL    RBC COMMENTS NORMOCYTIC, NORMOCHROMIC      DF SMEAR SCANNED                Opportunity for questions and clarifications were given to the incoming nurse. Patient's bed is in low position, side rails x2, door open PRN, call bell within reach and patient not in distress.       Jevon Alas RN sweating/related to chemo

## 2020-09-25 ENCOUNTER — HOSPITAL ENCOUNTER (EMERGENCY)
Age: 65
Discharge: HOME | End: 2020-09-25
Attending: EMERGENCY MEDICINE | Admitting: EMERGENCY MEDICINE

## 2020-09-25 VITALS
BODY MASS INDEX: 30.92 KG/M2 | WEIGHT: 204 LBS | HEIGHT: 68 IN | SYSTOLIC BLOOD PRESSURE: 141 MMHG | TEMPERATURE: 98.4 F | HEART RATE: 79 BPM | OXYGEN SATURATION: 94 % | DIASTOLIC BLOOD PRESSURE: 77 MMHG | RESPIRATION RATE: 16 BRPM

## 2020-09-25 DIAGNOSIS — F10.20 CHRONIC ALCOHOLISM (HCC): ICD-10-CM

## 2020-09-25 DIAGNOSIS — F10.920 ALCOHOLIC INTOXICATION WITHOUT COMPLICATION (HCC): Primary | ICD-10-CM

## 2020-09-25 LAB
ALBUMIN SERPL-MCNC: 4.3 G/DL (ref 3.5–5)
ALBUMIN/GLOB SERPL: 1 {RATIO} (ref 1.1–2.2)
ALP SERPL-CCNC: 68 U/L (ref 45–117)
ALT SERPL-CCNC: 42 U/L (ref 12–78)
ANION GAP SERPL CALC-SCNC: 8 MMOL/L (ref 5–15)
APPEARANCE UR: CLEAR
AST SERPL-CCNC: 62 U/L (ref 15–37)
ATRIAL RATE: 91 BPM
BACTERIA URNS QL MICRO: NEGATIVE /HPF
BASOPHILS # BLD: 0.1 K/UL (ref 0–0.1)
BASOPHILS NFR BLD: 1 % (ref 0–1)
BILIRUB SERPL-MCNC: 2.7 MG/DL (ref 0.2–1)
BILIRUB UR QL: NEGATIVE
BUN SERPL-MCNC: 13 MG/DL (ref 6–20)
BUN/CREAT SERPL: 15 (ref 12–20)
CALCIUM SERPL-MCNC: 8.7 MG/DL (ref 8.5–10.1)
CALCULATED P AXIS, ECG09: 56 DEGREES
CALCULATED R AXIS, ECG10: 63 DEGREES
CALCULATED T AXIS, ECG11: 25 DEGREES
CHLORIDE SERPL-SCNC: 104 MMOL/L (ref 97–108)
CO2 SERPL-SCNC: 27 MMOL/L (ref 21–32)
COLOR UR: ABNORMAL
CREAT SERPL-MCNC: 0.85 MG/DL (ref 0.7–1.3)
DIAGNOSIS, 93000: NORMAL
DIFFERENTIAL METHOD BLD: ABNORMAL
EOSINOPHIL # BLD: 0 K/UL (ref 0–0.4)
EOSINOPHIL NFR BLD: 0 % (ref 0–7)
EPITH CASTS URNS QL MICRO: ABNORMAL /LPF
ERYTHROCYTE [DISTWIDTH] IN BLOOD BY AUTOMATED COUNT: 11.9 % (ref 11.5–14.5)
ETHANOL SERPL-MCNC: 303 MG/DL
GLOBULIN SER CALC-MCNC: 4.1 G/DL (ref 2–4)
GLUCOSE SERPL-MCNC: 100 MG/DL (ref 65–100)
GLUCOSE UR STRIP.AUTO-MCNC: NEGATIVE MG/DL
HCT VFR BLD AUTO: 45.4 % (ref 36.6–50.3)
HGB BLD-MCNC: 14.6 G/DL (ref 12.1–17)
HGB UR QL STRIP: ABNORMAL
HYALINE CASTS URNS QL MICRO: ABNORMAL /LPF (ref 0–5)
IMM GRANULOCYTES # BLD AUTO: 0 K/UL (ref 0–0.04)
IMM GRANULOCYTES NFR BLD AUTO: 0 % (ref 0–0.5)
INR PPP: 1 (ref 0.9–1.1)
KETONES UR QL STRIP.AUTO: NEGATIVE MG/DL
LACTATE SERPL-SCNC: 3.3 MMOL/L (ref 0.4–2)
LACTATE SERPL-SCNC: 4 MMOL/L (ref 0.4–2)
LEUKOCYTE ESTERASE UR QL STRIP.AUTO: NEGATIVE
LYMPHOCYTES # BLD: 1.5 K/UL (ref 0.8–3.5)
LYMPHOCYTES NFR BLD: 34 % (ref 12–49)
MAGNESIUM SERPL-MCNC: 2.4 MG/DL (ref 1.6–2.4)
MCH RBC QN AUTO: 32.3 PG (ref 26–34)
MCHC RBC AUTO-ENTMCNC: 32.2 G/DL (ref 30–36.5)
MCV RBC AUTO: 100.4 FL (ref 80–99)
MONOCYTES # BLD: 0.4 K/UL (ref 0–1)
MONOCYTES NFR BLD: 8 % (ref 5–13)
NEUTS SEG # BLD: 2.5 K/UL (ref 1.8–8)
NEUTS SEG NFR BLD: 57 % (ref 32–75)
NITRITE UR QL STRIP.AUTO: NEGATIVE
NRBC # BLD: 0 K/UL (ref 0–0.01)
NRBC BLD-RTO: 0 PER 100 WBC
P-R INTERVAL, ECG05: 168 MS
PH UR STRIP: 5 [PH] (ref 5–8)
PLATELET # BLD AUTO: 237 K/UL (ref 150–400)
PMV BLD AUTO: 9.5 FL (ref 8.9–12.9)
POTASSIUM SERPL-SCNC: 4.3 MMOL/L (ref 3.5–5.1)
PROT SERPL-MCNC: 8.4 G/DL (ref 6.4–8.2)
PROT UR STRIP-MCNC: 30 MG/DL
PROTHROMBIN TIME: 10.3 SEC (ref 9–11.1)
Q-T INTERVAL, ECG07: 356 MS
QRS DURATION, ECG06: 92 MS
QTC CALCULATION (BEZET), ECG08: 437 MS
RBC # BLD AUTO: 4.52 M/UL (ref 4.1–5.7)
RBC #/AREA URNS HPF: ABNORMAL /HPF (ref 0–5)
SODIUM SERPL-SCNC: 139 MMOL/L (ref 136–145)
SP GR UR REFRACTOMETRY: 1.02 (ref 1–1.03)
TROPONIN I SERPL-MCNC: <0.05 NG/ML
UA: UC IF INDICATED,UAUC: ABNORMAL
UROBILINOGEN UR QL STRIP.AUTO: 1 EU/DL (ref 0.2–1)
VENTRICULAR RATE, ECG03: 91 BPM
WBC # BLD AUTO: 4.5 K/UL (ref 4.1–11.1)
WBC URNS QL MICRO: ABNORMAL /HPF (ref 0–4)

## 2020-09-25 PROCEDURE — 80053 COMPREHEN METABOLIC PANEL: CPT

## 2020-09-25 PROCEDURE — 99284 EMERGENCY DEPT VISIT MOD MDM: CPT

## 2020-09-25 PROCEDURE — 96361 HYDRATE IV INFUSION ADD-ON: CPT

## 2020-09-25 PROCEDURE — 83735 ASSAY OF MAGNESIUM: CPT

## 2020-09-25 PROCEDURE — 83605 ASSAY OF LACTIC ACID: CPT

## 2020-09-25 PROCEDURE — 96374 THER/PROPH/DIAG INJ IV PUSH: CPT

## 2020-09-25 PROCEDURE — 84484 ASSAY OF TROPONIN QUANT: CPT

## 2020-09-25 PROCEDURE — 93005 ELECTROCARDIOGRAM TRACING: CPT

## 2020-09-25 PROCEDURE — 74011000258 HC RX REV CODE- 258: Performed by: EMERGENCY MEDICINE

## 2020-09-25 PROCEDURE — 74011250636 HC RX REV CODE- 250/636: Performed by: EMERGENCY MEDICINE

## 2020-09-25 PROCEDURE — 36415 COLL VENOUS BLD VENIPUNCTURE: CPT

## 2020-09-25 PROCEDURE — 85025 COMPLETE CBC W/AUTO DIFF WBC: CPT

## 2020-09-25 PROCEDURE — 80307 DRUG TEST PRSMV CHEM ANLYZR: CPT

## 2020-09-25 PROCEDURE — 74011250637 HC RX REV CODE- 250/637: Performed by: EMERGENCY MEDICINE

## 2020-09-25 PROCEDURE — 85610 PROTHROMBIN TIME: CPT

## 2020-09-25 PROCEDURE — 81001 URINALYSIS AUTO W/SCOPE: CPT

## 2020-09-25 RX ORDER — THIAMINE HYDROCHLORIDE 100 MG/ML
100 INJECTION, SOLUTION INTRAMUSCULAR; INTRAVENOUS
Status: DISCONTINUED | OUTPATIENT
Start: 2020-09-25 | End: 2020-09-25

## 2020-09-25 RX ORDER — CHLORDIAZEPOXIDE HYDROCHLORIDE 5 MG/1
CAPSULE, GELATIN COATED ORAL
Qty: 68 CAP | Refills: 0 | Status: SHIPPED | OUTPATIENT
Start: 2020-09-25 | End: 2020-10-03

## 2020-09-25 RX ORDER — KETOROLAC TROMETHAMINE 30 MG/ML
15 INJECTION, SOLUTION INTRAMUSCULAR; INTRAVENOUS ONCE
Status: DISCONTINUED | OUTPATIENT
Start: 2020-09-25 | End: 2020-09-25 | Stop reason: HOSPADM

## 2020-09-25 RX ORDER — BUTALBITAL, ACETAMINOPHEN AND CAFFEINE 50; 325; 40 MG/1; MG/1; MG/1
1 TABLET ORAL
Status: COMPLETED | OUTPATIENT
Start: 2020-09-25 | End: 2020-09-25

## 2020-09-25 RX ORDER — CHLORDIAZEPOXIDE HYDROCHLORIDE 25 MG/1
25 CAPSULE, GELATIN COATED ORAL
Status: COMPLETED | OUTPATIENT
Start: 2020-09-25 | End: 2020-09-25

## 2020-09-25 RX ADMIN — SODIUM CHLORIDE 1000 ML: 900 INJECTION, SOLUTION INTRAVENOUS at 07:29

## 2020-09-25 RX ADMIN — THIAMINE HYDROCHLORIDE 100 MG: 100 INJECTION, SOLUTION INTRAMUSCULAR; INTRAVENOUS at 07:29

## 2020-09-25 RX ADMIN — SODIUM CHLORIDE 1000 ML: 900 INJECTION, SOLUTION INTRAVENOUS at 10:19

## 2020-09-25 RX ADMIN — CHLORDIAZEPOXIDE HYDROCHLORIDE 25 MG: 25 CAPSULE ORAL at 06:46

## 2020-09-25 RX ADMIN — BUTALBITAL, ACETAMINOPHEN, AND CAFFEINE 1 TABLET: 50; 325; 40 TABLET ORAL at 12:40

## 2020-09-25 NOTE — DISCHARGE INSTRUCTIONS
Substance Abuse and Addiction Resources    [facility]  [phone]  Closed meeting- family members that have been affected bysomeone alcohol abuse  Open meeting everyone can attend. Alcoholic's Anonymous [phone]  Non professional (alcoholics in recovery helping others)  [employer] (talk about sobriety, have desire)  No charge    Keyla Harrison is the Treatment Consultant in the [address]  Free one-on-one phone consultation and insurance verification  12 step based meetings and philosophy  Family programs and sessions    [facility] [phone]  Free individual assessment  Intensive outpatient outpatient program  Ambulatory withdrawal management/detoxification  Insurance required    [facility] [phone] ext [phone] or [phone] for patients with no insurance, Medicaid, Medicare  Sliding fee scale available for self pay  Patients go Monday-Friday from 8-4:30 to central intake for a shelter and then to [facility] for services  Offers a variety of services I.e. Laundry, shower, eye clinic, medical clinic, dental, substance abuse services, case management, etc.    [facility] [phone]  Make appointment with counselor  $08 fee for initial hour intake    [place] [facility] [phone]  Offers Detox and Inpatient Treatment for men only. Social detox  Is a homeless shelter with longterm 12 step program. Can choose just access to the hshelter component  Must be able to walk <1miles one way to attend classes, be highly motivated and willing to complete all 12 steps. 8 months- 1 year is the typical length of stay if accepted    [facility] [phone]  For residents of [facility]  They offer outpatient treatment and can make referrals for inpatient careBased on income. Will [employer]. Accept Medicaid.   They have a walk in clinic that patients can go to be screened for services. Two on the [facility] and [place] side of [facility]:   [place] [phone], [place] [address] (near [facility]). Walk in hours: Mon or Wed       12:30pm - [place]. Walk in hours: Tuesday 12:30pm 3pm Wed       8:30am- 11am    [employer] [phone]  $3500 entry fee  12 step meeting plan  No sex offenders accepted. Must get a job within 30 days after admission  After the 12 week, additional $125/week to stay    Narcotic Anonymous [phone], [phone]  Will refer to [facility] into Triage (takes 10-15 minutes)  Proof of [facility] residence with Picture ID  Medicaid and Self Pay. NO Private insurance    [place] [phone]  Referrals come from organizations like [employer] [facility], [employer], [facility]. Must be self pay. No insurance allowed. No patients on prescribed narcotics. No sex offenders. Need all medical mental health information and medication list sent prior to admit. [facility] [phone] ext [phone] between [phone]  Need social security card and picture ID  Must pass breath test and 10 panel Urine Drug Screen  No Sex Offenders or Psychiatric patients  Must not have been a 3x repeat at this facility  6 month in house- has to participate in work therapy 40 hours/week  Participates in spiritual classes, bible study and Gnosticism    [place] Alcoholics Anonymous [address], sent by [employer]  No Sex [address] [facility] (by [address])    [facility]  [phone]  Monday through Friday 8:30am to 5:00pm  Brief Telephone Interview. Then will be scheduled for next substance abuse services orientation group and then scheduled for intake interview.     [facility]  [phone]  Walk in Monday through Friday 9:00AM to 3:00PM  Bring Picture ID, Proof of residence (piece of mail), Proof of Insurance (Medicaid/sliding scale), Proof of income (any)    [facility] [phone]  Walk in then Assessment by licensed professional   [place] office ([address] [facility]) Monday, Tuesday, Thursday  9AM to 7[address] [facility] office ([address] [place] [address], [address]) Maureen Calzada 81  [phone]  Walk In Monday to Friday starting at [address] ID, Proof of residence (piece of mail), Proof of insurance (Medicaid/sliding scale)  At 9AM is the Substance Abuse Services Orientation Group and then scheduled for Intake Evaluation. Thank you for visiting our emergency department today. We all do hope that we were able to assist you in your emergent needs today. Please read over your discharge instructions as these contain pertinent information to help you in the healing process. These instructions include a list of prescriptions you were given today. Follow-up information is also noted on your discharge papers. There are attached instructions and information pertaining to the reason why you were seen in the emergency department today. These discharge instructions may not be for exactly why you were here, but may be the closest available instructions that we have. These include important advice for things that you can do at home to feel better, and reasons to return to the emergency department. The evaluation and treatment you received in the emergency department is not always definitive care. If follow-up with your primary care doctor or specialist was recommended, it is important that you make these appointments for follow-up care. You may need further testing, procedures, and/or medications to help you feel better. Further tests may be required that are not available in the emergency department. Failure to make these follow-up appointments may jeopardize your health. The emergency department is here for emergent stabilization and evaluation of life and limb threatening illness and/or injuries. Further care through a specialist or primary care doctor may be required to assist in your healing and complete your treatment and/or evaluation. We may not always be able to make a diagnosis in the emergency department, or things may change that will alter your diagnosis. Our primary goal is to ensure that nothing serious is occurring and that you are stable to continue your treatment and evaluation at home as an outpatient. Of course, if things change, and you feel worse, you are always encouraged to return to the emergency department for re-evaluation. Lab Results Today:  Recent Results (from the past 8 hour(s))   EKG, 12 LEAD, INITIAL    Collection Time: 09/25/20  6:47 AM   Result Value Ref Range    Ventricular Rate 91 BPM    Atrial Rate 91 BPM    P-R Interval 168 ms    QRS Duration 92 ms    Q-T Interval 356 ms    QTC Calculation (Bezet) 437 ms    Calculated P Axis 56 degrees    Calculated R Axis 63 degrees    Calculated T Axis 25 degrees    Diagnosis       Normal sinus rhythm  When compared with ECG of 30-OCT-2017 00:03,  T wave inversion no longer evident in Inferior leads  T wave inversion no longer evident in Lateral leads  Confirmed by Dayan Flores ([id number]) on 9/25/2020 9:31:31 AM     CBC WITH AUTOMATED DIFF    Collection Time: 09/25/20  6:55 AM   Result Value Ref Range    WBC 4.5 4.1 - 11.1 K/uL    RBC 4.52 4.10 - 5.70 M/uL    HGB 14.6 12.1 - 17.0 g/dL    HCT 45.4 36.6 - 50.3 %    .4 (H) 80.0 - 99.0 FL    MCH 32.3 26.0 - 34.0 PG    MCHC 32.2 30.0 - 36.5 g/dL    RDW 11.9 11.5 - 14.5 %    PLATELET 194 809 - 531 K/uL    MPV 9.5 8.9 - 12.9 FL    NRBC 0.0 0  WBC    ABSOLUTE NRBC 0.00 0.00 - 0.01 K/uL    NEUTROPHILS 57 32 - 75 %    LYMPHOCYTES 34 12 - 49 %    MONOCYTES 8 5 - 13 %    EOSINOPHILS 0 0 - 7 %    BASOPHILS 1 0 - 1 %    IMMATURE GRANULOCYTES 0 0.0 - 0.5 %    ABS. NEUTROPHILS 2.5 1.8 - 8.0 K/UL    ABS. LYMPHOCYTES 1.5 0.8 - 3.5 K/UL    ABS. MONOCYTES 0.4 0.0 - 1.0 K/UL    ABS. EOSINOPHILS 0.0 0.0 - 0.4 K/UL    ABS. BASOPHILS 0.1 0.0 - 0.1 K/UL    ABS. IMM. GRANS. 0.0 0.00 - 0.04 K/UL    DF AUTOMATED     PROTHROMBIN TIME + INR    Collection Time: 09/25/20  6:55 AM   Result Value Ref Range    INR 1.0 0.9 - 1.1      Prothrombin time 10.3 9.0 - 44.2 sec   METABOLIC PANEL, COMPREHENSIVE    Collection Time: 09/25/20  6:55 AM   Result Value Ref Range    Sodium 139 136 - 145 mmol/L    Potassium 4.3 3.5 - 5.1 mmol/L    Chloride 104 97 - 108 mmol/L    CO2 27 21 - 32 mmol/L    Anion gap 8 5 - 15 mmol/L    Glucose 100 65 - 100 mg/dL    BUN 13 6 - 20 MG/DL    Creatinine 0.85 0.70 - 1.30 MG/DL    BUN/Creatinine ratio 15 12 - 20      GFR est AA >60 >60 ml/min/1.73m2    GFR est non-AA >60 >60 ml/min/1.73m2    Calcium 8.7 8.5 - 10.1 MG/DL    Bilirubin, total 2.7 (H) 0.2 - 1.0 MG/DL    ALT (SGPT) 42 12 - 78 U/L    AST (SGOT) 62 (H) 15 - 37 U/L    Alk.  phosphatase 68 45 - 117 U/L    Protein, total 8.4 (H) 6.4 - 8.2 g/dL    Albumin 4.3 3.5 - 5.0 g/dL    Globulin 4.1 (H) 2.0 - 4.0 g/dL    A-G Ratio 1.0 (L) 1.1 - 2.2     MAGNESIUM    Collection Time: 09/25/20  6:55 AM   Result Value Ref Range    Magnesium 2.4 1.6 - 2.4 mg/dL   LACTIC ACID    Collection Time: 09/25/20  6:55 AM   Result Value Ref Range    Lactic acid 4.0 (HH) 0.4 - 2.0 MMOL/L   ETHYL ALCOHOL    Collection Time: 09/25/20  6:55 AM   Result Value Ref Range    ALCOHOL(ETHYL),SERUM 303 (H) <10 MG/DL   TROPONIN I    Collection Time: 09/25/20  6:55 AM   Result Value Ref Range    Troponin-I, Qt. <0.05 <0.05 ng/mL   URINALYSIS W/ REFLEX CULTURE    Collection Time: 09/25/20  7:52 AM    Specimen: Urine   Result Value Ref Range    Color DARK YELLOW      Appearance CLEAR CLEAR      Specific gravity 1.017 1.003 - 1.030      pH (UA) 5.0 5.0 - 8.0      Protein 30 (A) NEG mg/dL    Glucose Negative NEG mg/dL    Ketone Negative NEG mg/dL    Bilirubin Negative NEG      Blood SMALL (A) NEG      Urobilinogen 1.0 0.2 - 1.0 EU/dL    Nitrites Negative NEG      Leukocyte Esterase Negative NEG      WBC 0-4 0 - 4 /hpf    RBC 0-5 0 - 5 /hpf    Epithelial cells FEW FEW /lpf    Bacteria Negative NEG /hpf    UA:UC IF INDICATED CULTURE NOT INDICATED BY UA RESULT CNI      Hyaline cast 0-2 0 - 5 /lpf   LACTIC ACID    Collection Time: 09/25/20 11:28 AM   Result Value Ref Range    Lactic acid 3.3 (HH) 0.4 - 2.0 MMOL/L        Radiology Results Today:  No results found.

## 2020-09-25 NOTE — ED NOTES
Bedside and Verbal shift change report given to Janusz Cruz RN  (oncoming nurse) by Gerlean Cabot, RN  (offgoing nurse). Report included the following information SBAR, Kardex, ED Summary, Intake/Output, MAR, Recent Results and Med Rec Status.

## 2020-09-25 NOTE — ED NOTES
0626-ASSUMED care of Pt from triage. Alcohol Problem (pt reports that he normally drinks 1 pint per day - last drink 1 hour PTA; pt currently states that he wants to detox at this moment)  Pt denies Hal Perry, N/V.     0630- Dr. Rosita Avilez at bedside for evaluation.

## 2020-09-25 NOTE — ED PROVIDER NOTES
EMERGENCY DEPARTMENT HISTORY AND PHYSICAL EXAM      Date: 9/25/2020  Patient Name: Sivan Daniel    History of Presenting Illness     Chief Complaint   Patient presents with    Alcohol Problem     pt reports that he normally drinks 1 pint per day - last drink 1 hour PTA; pt currently states that he wants to detox at this moment       History Provided By: Patient    HPI: Sivan Daniel, 72 y.o. male  presents to the ED with cc of myalgias and bilateral leg pain. Patient states he has been binge drinking for the past 3 weeks. He has been sober for 3 years prior to this recent binge. His last drink was about 1 hour ago. He has been drinking more than 1 pint per day. He complains of feeling generally unwell and feeling ill. He has diffuse myalgias especially pain in his legs. No abdominal pain but he does describe nausea and discomfort because he has not been eating well. He has not been vomiting or had any diarrhea. No shortness of breath or chest pain. He states he has had a little bit of a runny nose but otherwise no other URI symptoms. No fevers or chills. Patient does have a history of alcohol withdrawal seizures. Past History     Past Medical History:  Past Medical History:   Diagnosis Date    Alcoholism ([facility]     Hypertension     Ill-defined condition     Hernia     Seizures ([facility]     Withdrawal        Past Surgical History:  Past Surgical History:   Procedure Laterality Date    COLONOSCOPY N/A 8/20/2018    COLONOS[facility] performed [facility] G[facility]iter [facility] at [facility] ENDOSCOPY    HX HERNIA REPAIR  1960's       Medications:  No current facility-administered medications on file prior to encounter. Current Outpatient Medications on File Prior to Encounter   Medication Sig Dispense Refill    naproxen sodium (ALEVE) 220 mg tablet Take 220 mg by mouth as needed.  Omega-3 Fatty Acids (FISH OIL) 500 mg cap Take  by mouth daily.          Family History:  Family History   Problem Relation Age of Onset    Hypertension Other     Breast Cancer Other     Cancer Mother         breast    No Known Problems Father     HIV/AIDS Brother     Alcohol abuse Brother        Social History:  Social History     Tobacco Use    Smoking status: Never Smoker    Smokeless tobacco: Never Used   Substance Use Topics    Alcohol use: Yes     Comment: 1/2-1 pint vodka/day +/- beer; no drinks since 11/2017    Drug use: No       Allergies: Allergies   Allergen Reactions    Lisinopril Angioedema       All the above components of the past  history are auto-populated from the electronic record. They have been reviewed and the patient has been interviewed for any pertinent past history that pertains to the patient's chief complaint and reason for visit. Not all pre-populated components may be accurate at the time this note was generated. Review of Systems   Review of Systems   Constitutional: Negative for chills and fever. HENT: Positive for rhinorrhea. Negative for congestion, ear pain, sore throat and trouble swallowing. Eyes: Negative for visual disturbance. Respiratory: Negative for cough, chest tightness and shortness of breath. Cardiovascular: Negative for chest pain and palpitations. Gastrointestinal: Positive for nausea. Negative for abdominal pain, blood in stool, constipation, diarrhea and vomiting. Genitourinary: Negative for decreased urine volume, difficulty urinating, dysuria and frequency. Musculoskeletal: Positive for myalgias. Negative for back pain and neck pain. Skin: Negative for color change and rash. Neurological: Negative for dizziness, weakness, light-headedness and headaches. Physical Exam   Physical Exam  Vitals signs and nursing note reviewed. Constitutional:       General: He is not in acute distress. Appearance: He is well-developed. He is not ill-appearing. Eyes:      Conjunctiva/sclera: Conjunctivae normal.   Neck:      Musculoskeletal: Neck supple. Cardiovascular:      Rate and Rhythm: Normal rate and regular rhythm. Pulmonary:      Effort: Pulmonary effort is normal. No accessory muscle usage or respiratory distress. Breath sounds: Normal breath sounds. Abdominal:      General: There is no distension. Palpations: Abdomen is soft. Tenderness: There is no abdominal tenderness. Lymphadenopathy:      Cervical: No cervical adenopathy. Skin:     General: Skin is warm and dry. Neurological:      Mental Status: He is alert and oriented to person, place, and time. Cranial Nerves: No cranial nerve deficit. Sensory: No sensory deficit. Diagnostic Study Results     Labs -     Recent Results (from the past 24 hour(s))   EKG, 12 LEAD, INITIAL    Collection Time: 09/25/20  6:47 AM   Result Value Ref Range    Ventricular Rate 91 BPM    Atrial Rate 91 BPM    P-R Interval 168 ms    QRS Duration 92 ms    Q-T Interval 356 ms    QTC Calculation (Bezet) 437 ms    Calculated P Axis 56 degrees    Calculated R Axis 63 degrees    Calculated T Axis 25 degrees    Diagnosis       Normal sinus rhythm  When compared with ECG of 30-OCT-2017 00:03,  T wave inversion no longer evident in Inferior leads  T wave inversion no longer evident in Lateral leads  Confirmed by Davon German ([id number]) on 9/25/2020 9:31:31 AM     CBC WITH AUTOMATED DIFF    Collection Time: 09/25/20  6:55 AM   Result Value Ref Range    WBC 4.5 4.1 - 11.1 K/uL    RBC 4.52 4.10 - 5.70 M/uL    HGB 14.6 12.1 - 17.0 g/dL    HCT 45.4 36.6 - 50.3 %    .4 (H) 80.0 - 99.0 FL    MCH 32.3 26.0 - 34.0 PG    MCHC 32.2 30.0 - 36.5 g/dL    RDW 11.9 11.5 - 14.5 %    PLATELET 935 725 - 124 K/uL    MPV 9.5 8.9 - 12.9 FL    NRBC 0.0 0  WBC    ABSOLUTE NRBC 0.00 0.00 - 0.01 K/uL    NEUTROPHILS 57 32 - 75 %    LYMPHOCYTES 34 12 - 49 %    MONOCYTES 8 5 - 13 %    EOSINOPHILS 0 0 - 7 %    BASOPHILS 1 0 - 1 %    IMMATURE GRANULOCYTES 0 0.0 - 0.5 %    ABS.  NEUTROPHILS 2.5 1.8 - 8.0 K/UL ABS. LYMPHOCYTES 1.5 0.8 - 3.5 K/UL    ABS. MONOCYTES 0.4 0.0 - 1.0 K/UL    ABS. EOSINOPHILS 0.0 0.0 - 0.4 K/UL    ABS. BASOPHILS 0.1 0.0 - 0.1 K/UL    ABS. IMM. GRANS. 0.0 0.00 - 0.04 K/UL    DF AUTOMATED     PROTHROMBIN TIME + INR    Collection Time: 09/25/20  6:55 AM   Result Value Ref Range    INR 1.0 0.9 - 1.1      Prothrombin time 10.3 9.0 - 35.1 sec   METABOLIC PANEL, COMPREHENSIVE    Collection Time: 09/25/20  6:55 AM   Result Value Ref Range    Sodium 139 136 - 145 mmol/L    Potassium 4.3 3.5 - 5.1 mmol/L    Chloride 104 97 - 108 mmol/L    CO2 27 21 - 32 mmol/L    Anion gap 8 5 - 15 mmol/L    Glucose 100 65 - 100 mg/dL    BUN 13 6 - 20 MG/DL    Creatinine 0.85 0.70 - 1.30 MG/DL    BUN/Creatinine ratio 15 12 - 20      GFR est AA >60 >60 ml/min/1.73m2    GFR est non-AA >60 >60 ml/min/1.73m2    Calcium 8.7 8.5 - 10.1 MG/DL    Bilirubin, total 2.7 (H) 0.2 - 1.0 MG/DL    ALT (SGPT) 42 12 - 78 U/L    AST (SGOT) 62 (H) 15 - 37 U/L    Alk.  phosphatase 68 45 - 117 U/L    Protein, total 8.4 (H) 6.4 - 8.2 g/dL    Albumin 4.3 3.5 - 5.0 g/dL    Globulin 4.1 (H) 2.0 - 4.0 g/dL    A-G Ratio 1.0 (L) 1.1 - 2.2     MAGNESIUM    Collection Time: 09/25/20  6:55 AM   Result Value Ref Range    Magnesium 2.4 1.6 - 2.4 mg/dL   LACTIC ACID    Collection Time: 09/25/20  6:55 AM   Result Value Ref Range    Lactic acid 4.0 (HH) 0.4 - 2.0 MMOL/L   ETHYL ALCOHOL    Collection Time: 09/25/20  6:55 AM   Result Value Ref Range    ALCOHOL(ETHYL),SERUM 303 (H) <10 MG/DL   TROPONIN I    Collection Time: 09/25/20  6:55 AM   Result Value Ref Range    Troponin-I, Qt. <0.05 <0.05 ng/mL   URINALYSIS W/ REFLEX CULTURE    Collection Time: 09/25/20  7:52 AM    Specimen: Urine   Result Value Ref Range    Color DARK YELLOW      Appearance CLEAR CLEAR      Specific gravity 1.017 1.003 - 1.030      pH (UA) 5.0 5.0 - 8.0      Protein 30 (A) NEG mg/dL    Glucose Negative NEG mg/dL    Ketone Negative NEG mg/dL    Bilirubin Negative NEG      Blood SMALL (A) NEG      Urobilinogen 1.0 0.2 - 1.0 EU/dL    Nitrites Negative NEG      Leukocyte Esterase Negative NEG      WBC 0-4 0 - 4 /hpf    RBC 0-5 0 - 5 /hpf    Epithelial cells FEW FEW /lpf    Bacteria Negative NEG /hpf    UA:UC IF INDICATED CULTURE NOT INDICATED BY UA RESULT CNI      Hyaline cast 0-2 0 - 5 /lpf   LACTIC ACID    Collection Time: 09/25/20 11:28 AM   Result Value Ref Range    Lactic acid 3.3 (HH) 0.4 - 2.0 MMOL/L       Radiologic Studies -   No orders to display     CT Results  (Last 48 hours)    None        CXR Results  (Last 48 hours)    None            Medical Decision Making     I reviewed the vital signs, available nursing notes, past medical history, past surgical history, family history and social history. Vital Signs-I have reviewed the vital signs that have been made available during the patient's emergency department visit. The vital signs auto-populated below are obtained mostly by electronic means through monitoring devices that have been downloaded into the patient's chart by the nursing staff. Some vital signs are not downloaded into the chart until after the patient has been discharged and this note has been completed, therefore some vital signs may not be available to the physician for review prior to patient's discharge or admission. The physician has reviewed the patient's triage vital signs, monitored the electronic monitoring devices remotely for any significant vital sign abnormalities, and have reviewed vital signs prior to discharge. Some vital signs reviewed at bedside or remotely utilizing electronic monitoring devices may be different than the vital signs downloaded into the electronic medical record. Some vital signs may be erroneous and inaccurate since they are obtained by electronic monitoring devices, and not all vital signs are verified for accuracy by nursing staff prior to downloading into the patient's chart.   Patient Vitals for the past 24 hrs:   Temp Pulse Resp BP SpO2   09/25/20 1100  79  (!) 141/77 94 %   09/25/20 0900    132/80 93 %   09/25/20 0754     94 %   09/25/20 0747     (!) 89 %   09/25/20 0702    138/76 94 %   09/25/20 0621 98.4 °F (36.9 °C) (!) 104 16 (!) 165/96 95 %         Records Reviewed: Nursing notes for today's visit have been reviewed. I have also reviewed most recent medical records pertinent to today's complaints, if available in our medical record system. I have also reviewed all labs and imaging results from previous results in comparison to results obtained today. If an EKG was obtained today, it has been compared to previous EKGs, if available. If arriving via EMS, the EMS report has been reviewed if made available to us within the patient's time in the emergency department. Provider Notes (Medical Decision Making):   Patient with a history of chronic alcoholism presents with alcohol intoxication and wanting to go into detox. He has been sober for 3 years but then 3 weeks ago fell off the wagon. He did consume alcohol this morning and his alcohol is above 300. He has a history of alcohol withdrawal seizures but here in the emergency department he is not displaying any significant signs of alcohol withdrawal.  He does have a lactic acidosis which is common for him. He was given IV fluids plus thiamine and symptomatic control here in the emergency department for headache and nausea. I will refer him to alcohol abuse resources and will prescribe a Librium taper for him to attempt detox at home. He has no significant medical complications today. ED Course:   Initial assessment performed. The patients presenting problems have been discussed, and they are in agreement with the care plan formulated and outlined with them. I have encouraged them to ask questions as they arise throughout their visit.          Orders Placed This Encounter    CBC WITH AUTOMATED DIFF    PROTHROMBIN TIME + INR    COMPREHENSIVE METABOLIC PANEL    MAGNESIUM    LACTIC ACID, PLASMA    ETHYL ALCOHOL    TROPONIN I    URINALYSIS W/ REFLEX CULTURE    LACTIC ACID    EKG NOTEWRITER(ASAP ONLY)    EKG, 12 LEAD, INITIAL    SALINE LOCK IV ONE TIME STAT    sodium chloride 0.9 % bolus infusion 1,000 mL    DISCONTD: thiamine (B-1) injection 100 mg    chlordiazePOXIDE (LIBRIUM) capsule 25 mg    thiamine (B-1) 100 mg in 0.9% sodium chloride 50 mL IVPB    sodium chloride 0.9 % bolus infusion 1,000 mL    ketorolac (TORADOL) injection 15 mg    chlordiazePOXIDE (LIBRIUM) 5 mg capsule    butalbital-acetaminophen-caffeine (FIORICET, ESGIC) -40 mg per tablet 1 Tab       EKG    Date/Time: 9/25/2020 6:47 AM  Performed by: Halie Amaral MD  Authorized by: Halie Amaral MD     ECG reviewed by ED Physician in the absence of a cardiologist: yes    Rate:     ECG rate:  91    ECG rate assessment: normal    Rhythm:     Rhythm: sinus rhythm    Ectopy:     Ectopy: none    QRS:     QRS axis:  Normal  Conduction:     Conduction: normal    ST segments:     ST segments:  Non-specific  T waves:     T waves: normal            Critical Care Time:   0    Disposition:  Discharge    The patient's emergency department evaluation is now complete. I have reviewed all labs, imaging, and pertinent information. I have discussed all results with the patient and/or family. Based on our evaluation today I do believe that the patient is safe to be discharged home. The patient has been provided with at home instructions that are pertinent to their complaint today, although these may not be specific to the exact diagnosis. I have reviewed the patient's home medications and attempted to reconcile if not already done so by pharmacy or nursing staff. I have discussed all new prescriptions with the patient. The patient has been encouraged to follow-up with primary care doctor and/or specialist, and these have been discussed with the patient.   The patient has been advised that they may return to the emergency department if they have any worsening symptoms and or new symptoms that are of concern to them. Verbal discharge instructions may have also been provided to the patient that may not be specifically contained in the written discharge instructions. The patient has been given opportunity to ask questions prior to discharge. PLAN:  1. Current Discharge Medication List      START taking these medications    Details   chlordiazePOXIDE (LIBRIUM) 5 mg capsule Take 5 Caps by mouth four (4) times daily as needed (alcohol withdrawal symptoms) for 1 day, THEN 4 Caps four (4) times daily as needed for 1 day, THEN 3 Caps four (4) times daily as needed for 1 day, THEN 2 Caps four (4) times daily as needed for 1 day, THEN 2 Caps three (3) times daily as needed for 1 day, THEN 1 Cap three (3) times daily as needed for 1 day, THEN 1 Cap two (2) times daily as needed for 1 day, THEN 1 Cap nightly as needed for up to 1 day. Max Daily Amount: 100 mg. Indications: symptoms from alcohol withdrawal  Qty: 68 Cap, Refills: 0    Associated Diagnoses: Alcoholism /alcohol abuse ([facility] [address]           2. Follow-up Information     Follow up With Specialties Details Why Contact Info    [facility] EMERGENCY DEPT Emergency Medicine Go to  If symptoms worsen [address] [facility] [address]    Mita Rodriguez MD Internal Medicine Schedule an appointment as soon as possible for a visit in 1 week  [address] [place] [address]  [phone]          Return to ED if worse     Diagnosis     Clinical Impression:   1. Alcoholic intoxication without complication ([facility]    2. Chronic alcoholism ([facility]    3. Alcoholism /alcohol abuse [facility])            This note will not be viewable in MyChart.

## 2021-01-03 ENCOUNTER — HOSPITAL ENCOUNTER (INPATIENT)
Age: 66
LOS: 1 days | Discharge: HOME | DRG: 897 | End: 2021-01-05
Attending: EMERGENCY MEDICINE | Admitting: INTERNAL MEDICINE
Payer: MEDICARE

## 2021-01-03 ENCOUNTER — APPOINTMENT (OUTPATIENT)
Dept: CT IMAGING | Age: 66
DRG: 897 | End: 2021-01-03
Attending: EMERGENCY MEDICINE
Payer: MEDICARE

## 2021-01-03 DIAGNOSIS — E87.20 LACTIC ACIDOSIS: ICD-10-CM

## 2021-01-03 DIAGNOSIS — F10.939 ALCOHOL WITHDRAWAL SEIZURE WITH COMPLICATION (HCC): Primary | ICD-10-CM

## 2021-01-03 DIAGNOSIS — R56.9 ALCOHOL WITHDRAWAL SEIZURE WITH COMPLICATION (HCC): Primary | ICD-10-CM

## 2021-01-03 DIAGNOSIS — E87.29 HIGH ANION GAP METABOLIC ACIDOSIS: ICD-10-CM

## 2021-01-03 DIAGNOSIS — E87.1 ACUTE HYPONATREMIA: ICD-10-CM

## 2021-01-03 DIAGNOSIS — N17.9 AKI (ACUTE KIDNEY INJURY) (HCC): ICD-10-CM

## 2021-01-03 DIAGNOSIS — G25.0 ESSENTIAL TREMOR: ICD-10-CM

## 2021-01-03 DIAGNOSIS — I10 ACCELERATED HYPERTENSION: ICD-10-CM

## 2021-01-03 LAB
ALBUMIN SERPL-MCNC: 4.1 G/DL (ref 3.5–5)
ALBUMIN/GLOB SERPL: 0.9 {RATIO} (ref 1.1–2.2)
ALP SERPL-CCNC: 72 U/L (ref 45–117)
ALT SERPL-CCNC: 58 U/L (ref 12–78)
ANION GAP SERPL CALC-SCNC: 22 MMOL/L (ref 5–15)
AST SERPL-CCNC: 71 U/L (ref 15–37)
BASOPHILS # BLD: 0 K/UL (ref 0–0.1)
BASOPHILS NFR BLD: 0 % (ref 0–1)
BILIRUB SERPL-MCNC: 2 MG/DL (ref 0.2–1)
BUN SERPL-MCNC: 7 MG/DL (ref 6–20)
BUN/CREAT SERPL: 5 (ref 12–20)
CALCIUM SERPL-MCNC: 9.4 MG/DL (ref 8.5–10.1)
CHLORIDE SERPL-SCNC: 90 MMOL/L (ref 97–108)
CO2 SERPL-SCNC: 14 MMOL/L (ref 21–32)
CREAT SERPL-MCNC: 1.39 MG/DL (ref 0.7–1.3)
DIFFERENTIAL METHOD BLD: ABNORMAL
EOSINOPHIL # BLD: 0 K/UL (ref 0–0.4)
EOSINOPHIL NFR BLD: 0 % (ref 0–7)
ERYTHROCYTE [DISTWIDTH] IN BLOOD BY AUTOMATED COUNT: 11.6 % (ref 11.5–14.5)
ETHANOL SERPL-MCNC: <10 MG/DL
GLOBULIN SER CALC-MCNC: 4.5 G/DL (ref 2–4)
GLUCOSE SERPL-MCNC: 157 MG/DL (ref 65–100)
HCT VFR BLD AUTO: 43.5 % (ref 36.6–50.3)
HGB BLD-MCNC: 14.7 G/DL (ref 12.1–17)
IMM GRANULOCYTES # BLD AUTO: 0 K/UL (ref 0–0.04)
IMM GRANULOCYTES NFR BLD AUTO: 0 % (ref 0–0.5)
LYMPHOCYTES # BLD: 0.4 K/UL (ref 0.8–3.5)
LYMPHOCYTES NFR BLD: 7 % (ref 12–49)
MAGNESIUM SERPL-MCNC: 2 MG/DL (ref 1.6–2.4)
MCH RBC QN AUTO: 33.4 PG (ref 26–34)
MCHC RBC AUTO-ENTMCNC: 33.8 G/DL (ref 30–36.5)
MCV RBC AUTO: 98.9 FL (ref 80–99)
MONOCYTES # BLD: 0.6 K/UL (ref 0–1)
MONOCYTES NFR BLD: 10 % (ref 5–13)
NEUTS SEG # BLD: 5.3 K/UL (ref 1.8–8)
NEUTS SEG NFR BLD: 83 % (ref 32–75)
NRBC # BLD: 0 K/UL (ref 0–0.01)
NRBC BLD-RTO: 0 PER 100 WBC
PLATELET # BLD AUTO: 210 K/UL (ref 150–400)
PMV BLD AUTO: 9.3 FL (ref 8.9–12.9)
POTASSIUM SERPL-SCNC: 3.7 MMOL/L (ref 3.5–5.1)
PROT SERPL-MCNC: 8.6 G/DL (ref 6.4–8.2)
RBC # BLD AUTO: 4.4 M/UL (ref 4.1–5.7)
RBC MORPH BLD: ABNORMAL
SODIUM SERPL-SCNC: 126 MMOL/L (ref 136–145)
WBC # BLD AUTO: 6.3 K/UL (ref 4.1–11.1)

## 2021-01-03 PROCEDURE — 96366 THER/PROPH/DIAG IV INF ADDON: CPT

## 2021-01-03 PROCEDURE — 80053 COMPREHEN METABOLIC PANEL: CPT

## 2021-01-03 PROCEDURE — 70450 CT HEAD/BRAIN W/O DYE: CPT

## 2021-01-03 PROCEDURE — 99285 EMERGENCY DEPT VISIT HI MDM: CPT

## 2021-01-03 PROCEDURE — 36415 COLL VENOUS BLD VENIPUNCTURE: CPT

## 2021-01-03 PROCEDURE — 96365 THER/PROPH/DIAG IV INF INIT: CPT

## 2021-01-03 PROCEDURE — 85025 COMPLETE CBC W/AUTO DIFF WBC: CPT

## 2021-01-03 PROCEDURE — 82077 ASSAY SPEC XCP UR&BREATH IA: CPT

## 2021-01-03 PROCEDURE — 83735 ASSAY OF MAGNESIUM: CPT

## 2021-01-03 PROCEDURE — 93005 ELECTROCARDIOGRAM TRACING: CPT

## 2021-01-03 PROCEDURE — 74011250636 HC RX REV CODE- 250/636: Performed by: EMERGENCY MEDICINE

## 2021-01-03 RX ORDER — AMLODIPINE BESYLATE 10 MG/1
10 TABLET ORAL DAILY
COMMUNITY

## 2021-01-03 RX ORDER — LORAZEPAM 2 MG/ML
1 INJECTION INTRAMUSCULAR ONCE
Status: COMPLETED | OUTPATIENT
Start: 2021-01-03 | End: 2021-01-03

## 2021-01-03 RX ADMIN — LORAZEPAM 1 MG: 2 INJECTION INTRAMUSCULAR; INTRAVENOUS at 23:39

## 2021-01-04 ENCOUNTER — APPOINTMENT (OUTPATIENT)
Dept: GENERAL RADIOLOGY | Age: 66
DRG: 897 | End: 2021-01-04
Attending: NURSE PRACTITIONER
Payer: MEDICARE

## 2021-01-04 PROBLEM — F10.10 ETOH ABUSE: Status: ACTIVE | Noted: 2021-01-04

## 2021-01-04 LAB
ANION GAP SERPL CALC-SCNC: 6 MMOL/L (ref 5–15)
ANION GAP SERPL CALC-SCNC: 8 MMOL/L (ref 5–15)
APPEARANCE UR: CLEAR
ARTERIAL PATENCY WRIST A: ABNORMAL
ATRIAL RATE: 119 BPM
ATRIAL RATE: 132 BPM
BACTERIA URNS QL MICRO: NEGATIVE /HPF
BASE DEFICIT BLD-SCNC: 3 MMOL/L
BASOPHILS # BLD: 0 K/UL (ref 0–0.1)
BASOPHILS NFR BLD: 0 % (ref 0–1)
BDY SITE: ABNORMAL
BILIRUB UR QL: NEGATIVE
BUN SERPL-MCNC: 5 MG/DL (ref 6–20)
BUN SERPL-MCNC: 6 MG/DL (ref 6–20)
BUN/CREAT SERPL: 8 (ref 12–20)
BUN/CREAT SERPL: 8 (ref 12–20)
CA-I BLD-SCNC: 1.08 MMOL/L (ref 1.12–1.32)
CALCIUM SERPL-MCNC: 8 MG/DL (ref 8.5–10.1)
CALCIUM SERPL-MCNC: 8.5 MG/DL (ref 8.5–10.1)
CALCULATED P AXIS, ECG09: 30 DEGREES
CALCULATED P AXIS, ECG09: 31 DEGREES
CALCULATED R AXIS, ECG10: 53 DEGREES
CALCULATED R AXIS, ECG10: 77 DEGREES
CALCULATED T AXIS, ECG11: -12 DEGREES
CALCULATED T AXIS, ECG11: 52 DEGREES
CHLORIDE SERPL-SCNC: 100 MMOL/L (ref 97–108)
CHLORIDE SERPL-SCNC: 104 MMOL/L (ref 97–108)
CK SERPL-CCNC: 341 U/L (ref 39–308)
CO2 SERPL-SCNC: 25 MMOL/L (ref 21–32)
CO2 SERPL-SCNC: 27 MMOL/L (ref 21–32)
COLOR UR: ABNORMAL
COMMENT, HOLDF: NORMAL
CREAT SERPL-MCNC: 0.65 MG/DL (ref 0.7–1.3)
CREAT SERPL-MCNC: 0.71 MG/DL (ref 0.7–1.3)
DIAGNOSIS, 93000: NORMAL
DIAGNOSIS, 93000: NORMAL
DIFFERENTIAL METHOD BLD: ABNORMAL
EOSINOPHIL # BLD: 0 K/UL (ref 0–0.4)
EOSINOPHIL NFR BLD: 0 % (ref 0–7)
EPITH CASTS URNS QL MICRO: ABNORMAL /LPF
ERYTHROCYTE [DISTWIDTH] IN BLOOD BY AUTOMATED COUNT: 11.6 % (ref 11.5–14.5)
GAS FLOW.O2 O2 DELIVERY SYS: ABNORMAL L/MIN
GLUCOSE SERPL-MCNC: 108 MG/DL (ref 65–100)
GLUCOSE SERPL-MCNC: 111 MG/DL (ref 65–100)
GLUCOSE UR STRIP.AUTO-MCNC: NEGATIVE MG/DL
HCO3 BLD-SCNC: 20.9 MMOL/L (ref 22–26)
HCT VFR BLD AUTO: 37.9 % (ref 36.6–50.3)
HGB BLD-MCNC: 13.1 G/DL (ref 12.1–17)
HGB UR QL STRIP: ABNORMAL
HYALINE CASTS URNS QL MICRO: ABNORMAL /LPF (ref 0–5)
IMM GRANULOCYTES # BLD AUTO: 0 K/UL (ref 0–0.04)
IMM GRANULOCYTES NFR BLD AUTO: 0 % (ref 0–0.5)
KETONES UR QL STRIP.AUTO: NEGATIVE MG/DL
LACTATE BLD-SCNC: 8.21 MMOL/L (ref 0.4–2)
LACTATE SERPL-SCNC: 1 MMOL/L (ref 0.4–2)
LACTATE SERPL-SCNC: 2.4 MMOL/L (ref 0.4–2)
LEUKOCYTE ESTERASE UR QL STRIP.AUTO: NEGATIVE
LYMPHOCYTES # BLD: 0.5 K/UL (ref 0.8–3.5)
LYMPHOCYTES NFR BLD: 7 % (ref 12–49)
MAGNESIUM SERPL-MCNC: 2.1 MG/DL (ref 1.6–2.4)
MCH RBC QN AUTO: 33.5 PG (ref 26–34)
MCHC RBC AUTO-ENTMCNC: 34.6 G/DL (ref 30–36.5)
MCV RBC AUTO: 96.9 FL (ref 80–99)
MONOCYTES # BLD: 0.8 K/UL (ref 0–1)
MONOCYTES NFR BLD: 10 % (ref 5–13)
NEUTS SEG # BLD: 6 K/UL (ref 1.8–8)
NEUTS SEG NFR BLD: 82 % (ref 32–75)
NITRITE UR QL STRIP.AUTO: NEGATIVE
NRBC # BLD: 0 K/UL (ref 0–0.01)
NRBC BLD-RTO: 0 PER 100 WBC
O2/TOTAL GAS SETTING VFR VENT: 21 %
P-R INTERVAL, ECG05: 146 MS
P-R INTERVAL, ECG05: 156 MS
PCO2 BLD: 28.3 MMHG (ref 35–45)
PH BLD: 7.48 [PH] (ref 7.35–7.45)
PH UR STRIP: 6 [PH] (ref 5–8)
PHOSPHATE SERPL-MCNC: 2.1 MG/DL (ref 2.6–4.7)
PLATELET # BLD AUTO: 211 K/UL (ref 150–400)
PMV BLD AUTO: 9.6 FL (ref 8.9–12.9)
PO2 BLD: 74 MMHG (ref 80–100)
POTASSIUM SERPL-SCNC: 3.1 MMOL/L (ref 3.5–5.1)
POTASSIUM SERPL-SCNC: 3.3 MMOL/L (ref 3.5–5.1)
PROT UR STRIP-MCNC: NEGATIVE MG/DL
Q-T INTERVAL, ECG07: 286 MS
Q-T INTERVAL, ECG07: 318 MS
QRS DURATION, ECG06: 82 MS
QRS DURATION, ECG06: 84 MS
QTC CALCULATION (BEZET), ECG08: 423 MS
QTC CALCULATION (BEZET), ECG08: 447 MS
RBC # BLD AUTO: 3.91 M/UL (ref 4.1–5.7)
RBC #/AREA URNS HPF: ABNORMAL /HPF (ref 0–5)
SAMPLES BEING HELD,HOLD: NORMAL
SAO2 % BLD: 96 % (ref 92–97)
SODIUM SERPL-SCNC: 133 MMOL/L (ref 136–145)
SODIUM SERPL-SCNC: 137 MMOL/L (ref 136–145)
SP GR UR REFRACTOMETRY: <1.005 (ref 1–1.03)
SPECIMEN TYPE: ABNORMAL
TOTAL RESP. RATE, ITRR: 20
UA: UC IF INDICATED,UAUC: ABNORMAL
UROBILINOGEN UR QL STRIP.AUTO: 0.2 EU/DL (ref 0.2–1)
VENTRICULAR RATE, ECG03: 119 BPM
VENTRICULAR RATE, ECG03: 132 BPM
WBC # BLD AUTO: 7.4 K/UL (ref 4.1–11.1)
WBC URNS QL MICRO: ABNORMAL /HPF (ref 0–4)

## 2021-01-04 PROCEDURE — 83605 ASSAY OF LACTIC ACID: CPT

## 2021-01-04 PROCEDURE — 85025 COMPLETE CBC W/AUTO DIFF WBC: CPT

## 2021-01-04 PROCEDURE — 74011000258 HC RX REV CODE- 258: Performed by: NURSE PRACTITIONER

## 2021-01-04 PROCEDURE — 83735 ASSAY OF MAGNESIUM: CPT

## 2021-01-04 PROCEDURE — 71045 X-RAY EXAM CHEST 1 VIEW: CPT

## 2021-01-04 PROCEDURE — 36415 COLL VENOUS BLD VENIPUNCTURE: CPT

## 2021-01-04 PROCEDURE — 74011250636 HC RX REV CODE- 250/636: Performed by: STUDENT IN AN ORGANIZED HEALTH CARE EDUCATION/TRAINING PROGRAM

## 2021-01-04 PROCEDURE — 74011250636 HC RX REV CODE- 250/636: Performed by: INTERNAL MEDICINE

## 2021-01-04 PROCEDURE — 74011250637 HC RX REV CODE- 250/637: Performed by: NURSE PRACTITIONER

## 2021-01-04 PROCEDURE — 80048 BASIC METABOLIC PNL TOTAL CA: CPT

## 2021-01-04 PROCEDURE — 65660000000 HC RM CCU STEPDOWN

## 2021-01-04 PROCEDURE — 74011000258 HC RX REV CODE- 258: Performed by: STUDENT IN AN ORGANIZED HEALTH CARE EDUCATION/TRAINING PROGRAM

## 2021-01-04 PROCEDURE — 82803 BLOOD GASES ANY COMBINATION: CPT

## 2021-01-04 PROCEDURE — 74011250636 HC RX REV CODE- 250/636: Performed by: EMERGENCY MEDICINE

## 2021-01-04 PROCEDURE — 74011250636 HC RX REV CODE- 250/636: Performed by: NURSE PRACTITIONER

## 2021-01-04 PROCEDURE — 74011000250 HC RX REV CODE- 250: Performed by: EMERGENCY MEDICINE

## 2021-01-04 PROCEDURE — 84100 ASSAY OF PHOSPHORUS: CPT

## 2021-01-04 PROCEDURE — 82550 ASSAY OF CK (CPK): CPT

## 2021-01-04 PROCEDURE — 81001 URINALYSIS AUTO W/SCOPE: CPT

## 2021-01-04 PROCEDURE — 74011250637 HC RX REV CODE- 250/637: Performed by: STUDENT IN AN ORGANIZED HEALTH CARE EDUCATION/TRAINING PROGRAM

## 2021-01-04 PROCEDURE — 93005 ELECTROCARDIOGRAM TRACING: CPT

## 2021-01-04 RX ORDER — POLYETHYLENE GLYCOL 3350 17 G/17G
17 POWDER, FOR SOLUTION ORAL DAILY
Status: DISCONTINUED | OUTPATIENT
Start: 2021-01-04 | End: 2021-01-05 | Stop reason: HOSPADM

## 2021-01-04 RX ORDER — ENOXAPARIN SODIUM 100 MG/ML
40 INJECTION SUBCUTANEOUS DAILY
Status: DISCONTINUED | OUTPATIENT
Start: 2021-01-04 | End: 2021-01-04

## 2021-01-04 RX ORDER — GABAPENTIN 300 MG/1
300 CAPSULE ORAL 2 TIMES DAILY
Status: DISCONTINUED | OUTPATIENT
Start: 2021-01-07 | End: 2021-01-05 | Stop reason: HOSPADM

## 2021-01-04 RX ORDER — ONDANSETRON 2 MG/ML
4 INJECTION INTRAMUSCULAR; INTRAVENOUS
Status: DISCONTINUED | OUTPATIENT
Start: 2021-01-04 | End: 2021-01-05 | Stop reason: HOSPADM

## 2021-01-04 RX ORDER — CLONIDINE HYDROCHLORIDE 0.1 MG/1
0.1 TABLET ORAL EVERY 8 HOURS
Status: DISCONTINUED | OUTPATIENT
Start: 2021-01-04 | End: 2021-01-05 | Stop reason: HOSPADM

## 2021-01-04 RX ORDER — PROMETHAZINE HYDROCHLORIDE 25 MG/1
12.5 TABLET ORAL
Status: DISCONTINUED | OUTPATIENT
Start: 2021-01-04 | End: 2021-01-04

## 2021-01-04 RX ORDER — LORAZEPAM 2 MG/ML
2 INJECTION INTRAMUSCULAR
Status: DISCONTINUED | OUTPATIENT
Start: 2021-01-04 | End: 2021-01-05 | Stop reason: HOSPADM

## 2021-01-04 RX ORDER — ACETAMINOPHEN 650 MG/1
650 SUPPOSITORY RECTAL
Status: DISCONTINUED | OUTPATIENT
Start: 2021-01-04 | End: 2021-01-05 | Stop reason: HOSPADM

## 2021-01-04 RX ORDER — POLYETHYLENE GLYCOL 3350 17 G/17G
17 POWDER, FOR SOLUTION ORAL DAILY PRN
Status: DISCONTINUED | OUTPATIENT
Start: 2021-01-04 | End: 2021-01-05 | Stop reason: HOSPADM

## 2021-01-04 RX ORDER — ACETAMINOPHEN 325 MG/1
650 TABLET ORAL
Status: DISCONTINUED | OUTPATIENT
Start: 2021-01-04 | End: 2021-01-05 | Stop reason: HOSPADM

## 2021-01-04 RX ORDER — ASPIRIN 325 MG/1
200 TABLET, FILM COATED ORAL DAILY
Status: DISCONTINUED | OUTPATIENT
Start: 2021-01-05 | End: 2021-01-05 | Stop reason: HOSPADM

## 2021-01-04 RX ORDER — SODIUM CHLORIDE 0.9 % (FLUSH) 0.9 %
5-40 SYRINGE (ML) INJECTION AS NEEDED
Status: DISCONTINUED | OUTPATIENT
Start: 2021-01-04 | End: 2021-01-05 | Stop reason: HOSPADM

## 2021-01-04 RX ORDER — DEXTROSE MONOHYDRATE AND SODIUM CHLORIDE 5; .9 G/100ML; G/100ML
75 INJECTION, SOLUTION INTRAVENOUS CONTINUOUS
Status: DISCONTINUED | OUTPATIENT
Start: 2021-01-04 | End: 2021-01-04

## 2021-01-04 RX ORDER — SODIUM CHLORIDE 0.9 % (FLUSH) 0.9 %
5-40 SYRINGE (ML) INJECTION EVERY 8 HOURS
Status: DISCONTINUED | OUTPATIENT
Start: 2021-01-04 | End: 2021-01-05 | Stop reason: HOSPADM

## 2021-01-04 RX ORDER — HEPARIN SODIUM 5000 [USP'U]/ML
5000 INJECTION, SOLUTION INTRAVENOUS; SUBCUTANEOUS EVERY 12 HOURS
Status: DISCONTINUED | OUTPATIENT
Start: 2021-01-04 | End: 2021-01-04

## 2021-01-04 RX ORDER — AMOXICILLIN 250 MG
1 CAPSULE ORAL 2 TIMES DAILY
Status: DISCONTINUED | OUTPATIENT
Start: 2021-01-04 | End: 2021-01-05 | Stop reason: HOSPADM

## 2021-01-04 RX ORDER — ENOXAPARIN SODIUM 100 MG/ML
40 INJECTION SUBCUTANEOUS EVERY 24 HOURS
Status: DISCONTINUED | OUTPATIENT
Start: 2021-01-04 | End: 2021-01-05 | Stop reason: HOSPADM

## 2021-01-04 RX ORDER — LORAZEPAM 2 MG/ML
2 INJECTION INTRAMUSCULAR ONCE
Status: COMPLETED | OUTPATIENT
Start: 2021-01-04 | End: 2021-01-04

## 2021-01-04 RX ORDER — FOLIC ACID 1 MG/1
1 TABLET ORAL DAILY
Status: DISCONTINUED | OUTPATIENT
Start: 2021-01-05 | End: 2021-01-05 | Stop reason: HOSPADM

## 2021-01-04 RX ORDER — GABAPENTIN 300 MG/1
300 CAPSULE ORAL 3 TIMES DAILY
Status: DISCONTINUED | OUTPATIENT
Start: 2021-01-04 | End: 2021-01-05 | Stop reason: HOSPADM

## 2021-01-04 RX ORDER — ASPIRIN 325 MG/1
100 TABLET, FILM COATED ORAL DAILY
Status: DISCONTINUED | OUTPATIENT
Start: 2021-01-05 | End: 2021-01-04

## 2021-01-04 RX ADMIN — SODIUM CHLORIDE 1000 ML: 9 INJECTION, SOLUTION INTRAVENOUS at 00:26

## 2021-01-04 RX ADMIN — Medication 10 ML: at 14:05

## 2021-01-04 RX ADMIN — DOCUSATE SODIUM - SENNOSIDES 1 TABLET: 50; 8.6 TABLET, FILM COATED ORAL at 10:28

## 2021-01-04 RX ADMIN — THIAMINE HYDROCHLORIDE 500 MG: 100 INJECTION, SOLUTION INTRAMUSCULAR; INTRAVENOUS at 22:01

## 2021-01-04 RX ADMIN — GABAPENTIN 300 MG: 300 CAPSULE ORAL at 08:52

## 2021-01-04 RX ADMIN — CLONIDINE HYDROCHLORIDE 0.1 MG: 0.1 TABLET ORAL at 22:00

## 2021-01-04 RX ADMIN — THIAMINE HYDROCHLORIDE 500 MG: 100 INJECTION, SOLUTION INTRAMUSCULAR; INTRAVENOUS at 16:24

## 2021-01-04 RX ADMIN — Medication 10 ML: at 22:00

## 2021-01-04 RX ADMIN — CLONIDINE HYDROCHLORIDE 0.1 MG: 0.1 TABLET ORAL at 08:52

## 2021-01-04 RX ADMIN — GABAPENTIN 300 MG: 300 CAPSULE ORAL at 16:24

## 2021-01-04 RX ADMIN — ENOXAPARIN SODIUM 40 MG: 40 INJECTION SUBCUTANEOUS at 08:52

## 2021-01-04 RX ADMIN — Medication 10 ML: at 08:53

## 2021-01-04 RX ADMIN — Medication 10 ML: at 02:33

## 2021-01-04 RX ADMIN — FOLIC ACID: 5 INJECTION, SOLUTION INTRAMUSCULAR; INTRAVENOUS; SUBCUTANEOUS at 00:14

## 2021-01-04 RX ADMIN — LORAZEPAM 2 MG: 2 INJECTION INTRAMUSCULAR; INTRAVENOUS at 06:10

## 2021-01-04 RX ADMIN — LORAZEPAM 2 MG: 2 INJECTION INTRAMUSCULAR; INTRAVENOUS at 02:26

## 2021-01-04 RX ADMIN — THIAMINE HYDROCHLORIDE 500 MG: 100 INJECTION, SOLUTION INTRAMUSCULAR; INTRAVENOUS at 10:28

## 2021-01-04 RX ADMIN — DOCUSATE SODIUM - SENNOSIDES 1 TABLET: 50; 8.6 TABLET, FILM COATED ORAL at 22:00

## 2021-01-04 RX ADMIN — GABAPENTIN 300 MG: 300 CAPSULE ORAL at 22:00

## 2021-01-04 RX ADMIN — CLONIDINE HYDROCHLORIDE 0.1 MG: 0.1 TABLET ORAL at 14:05

## 2021-01-04 RX ADMIN — DEXTROSE MONOHYDRATE AND SODIUM CHLORIDE 125 ML/HR: 5; .9 INJECTION, SOLUTION INTRAVENOUS at 02:35

## 2021-01-04 RX ADMIN — SODIUM CHLORIDE 1000 ML: 9 INJECTION, SOLUTION INTRAVENOUS at 02:26

## 2021-01-04 NOTE — ED NOTES
Pt sleeping and calm, unable to complete ciwa/etoh assesment. Pt V/S 94%o2, 141/96, 111hr, 28R, unable to get temp.

## 2021-01-04 NOTE — PROGRESS NOTES
ZACK Plan  · Follow up appointments(PCP)  · Alcohol inpatient vs outpatient resources( Patient has hx with [facility] [phone] for inpatient alcohol abuse treatment. CM attempted several times to connect with facility to inquire about bed availability. · Wife to transport at d/c  · 2nd IM needed before discharge    Reason for Admission:   ETOH Withdrawal                   RUR Score:     9%                Plan for utilizing home health:    Not at this time       PCP: First and Last name:  Dr. No Celeste   Name of Practice:    Are you a current patient: yes Yes/No:    Approximate date of last visit:   Approximately 6 months ago per patient    Can you participate in a virtual visit with your PCP: Yes                    Current Advanced Directive/Advance Care Plan:    Patient is a full code, patient does not have any ACP documents on file. Patient declined to complete ACP documents at this time. Patient wife Ami Quinn. Transition of Care Plan:                    CM acknowledged CM consult for alcohol rehab resources. CM met with patient at the bedside to discuss discharge planning and options for alcohol in patient vs outpatient rehab. Patient's name, , and demographics all verified in chart. Patient lives in a two story home with his wife. Patient is independent of his ADLS/IDLS and does not use any DME at baseline. Patient reports he still drives. Patient's preferred pharmacy is [facility]. Patient has no SNF, IPR, or HH history as he reports. Patient stated that he had been sober for 3 years, but recently relapsed. Patient stated he had attended [facility] previously for inpatient rehab for alcohol abuse treatment. CM gave patient a list of resources for outpatient and inpatient treatment options. CM attempted several times to call [facility] [phone] to inquire about bed availbility for in patient treatment.  CM was unable to connect with anyone. Care Management Interventions  PCP Verified by CM: Yes  Mode of Transport at Discharge: Other (see comment)(Wife to transport at discharge)  Transition of Care Consult (CM Consult): Discharge Planning  MyChart Signup: No  Discharge Durable Medical Equipment: No  Physical Therapy Consult: No  Occupational Therapy Consult: No  Speech Therapy Consult: Yes  Current Support Network: Lives with Spouse  Confirm Follow Up Transport: Self  The Plan for Transition of Care is Related to the Following Treatment Goals : Inpatient Rehab vs. Outpatient Alcohol Rehab services  The Patient and/or Patient Representative was Provided with a Choice of Provider and Agrees with the Discharge Plan?: Yes  [place] Resource Information Provided?: No  Discharge Location  Discharge Placement: Other:(Inpatient Rehab vs. Outpatient Alcohol Rehab services)    Unit CM will continue to follow for any discharge needs.     JS DurbinN, RN, [facility]

## 2021-01-04 NOTE — PROGRESS NOTES
1525 pt received from ed and CIWA preformed. Scored 3    1806 pt wife stated pt takes amlodipine 10mg for bp and would like MD to be aware. MD notified     End of Shift Note    Bedside shift change report given to  (oncoming nurse) by Sara Sue (offgoing nurse). Report included the following information SBAR, Kardex and Intake/Output    Shift worked:  [id number]     Shift summary and any significant changes:          Concerns for physician to address:       Zone phone for oncoming shift:          Activity:  Activity Level: Bed Rest  Number times ambulated in hallways past shift: 0  Number of times OOB to chair past shift: 0    Cardiac:   Cardiac Monitoring: Yes      Cardiac Rhythm: Normal sinus rhythm    Access:   Current line(s): PIV     Genitourinary:   Urinary status: voiding    Respiratory:   O2 Device: Room air  Chronic home O2 use?: NO  Incentive spirometer at bedside: NO     GI:  Last Bowel Movement Date: 01/03/21  Current diet:  DIET REGULAR  Passing flatus: YES  Tolerating current diet: YES       Pain Management:   Patient states pain is manageable on current regimen: YES    Skin:  Robinson Score: 20  Interventions: increase time out of bed    Patient Safety:  Fall Score:  Total Score: 1  Interventions: pt to call before getting OOB       Length of Stay:  Expected LOS: - - -  Actual LOS: 0      Sara Sue

## 2021-01-04 NOTE — H&P
[facility]    ICU TEAM Progress Note    Name: Kassidy Raygoza Sr.   : 1955   MRN: [id number]   Date: 2021      Assessment:     ICU Problems:  etoh withdrawal  1. Witnessed seizure  2. Electrolyte monitoring and repletion  3. Hyponatremia  4. Acidosis  5. CIWA scoring and management    ICU Comprehensive Plan of Care:     Plans for this Shift:     Monitor for etoh withdrawal and seizure mx  Electrolyte monitoring and repletion    1. SBP Goal of: > 90 mmHg  2. MAP Goal of: > 65 mmHg  3. None - For above SBP/MAP goals  4. IVFs: NS  5. Transfusion Trigger (Hgb): <7 g/dL  6. Respiratory Goals:  a. Head of bed > 30 degrees  b. Incentive spirometry  7. Pulmonary toilet: Incentive Spirometry   8. SpO2 Goal: > 92% via NC  9. Keep K>4; Mg>2   10. PT/OT: PT consulted and on board and OT consulted and on board   11. Discussed Plan of Care/Code Status: Full Code  12. Appreciate Consultants Input none  13. Discussed Care Plan with Bedside RN  14. Documentation of Current Medications  15. Rest of Plan Below:    F - Feeding:  Pending   A - Analgesia: Acetaminophen  S - Sedation: None  T - DVT Prophylaxis: SCD's or Sequential Compression Device and Lovenox   H - Head of Bed: > 30 Degrees  U - Ulcer Prophylaxis: Not at this time   G - Glycemic Control: Insulin  S - Spontaneous Breathing Trial: N/A  B - Bowel Regimen: Senna, MiraLax and Docusate (Colace)  I - Indwelling Catheter:   Tubes: None  Lines: None  Drains: None  D - De-escalation of Antibiotics: none    Subjective:   Progress Note: 2021      Reason for ICU Admission: etoh withdrawal     HPI:  Per ED note:  72 y.o. male with past medical history as documented below presents to the ED with c/o of acute onset of witnessed grand mal seizure occurring about 1 hour ago. Patient reports a history of alcohol withdrawal seizures, last drink was yesterday. Reports last admission for seizures approximately several months ago.   Denies any previous history of seizures.     Pt denies any other alleviating or exacerbating factors. Additionally, pt specifically denies any recent fever, chills, headache, nausea, vomiting, abdominal pain, CP, SOB, lightheadedness, dizziness, numbness, weakness, lower extremity swelling, heart palpitations, urinary sxs, diarrhea, constipation, melena, hematochezia, cough, or congestion. Overnight Events:   1/4/2021  Presents to ED, s/p witnessed seizure, admit to ICU for cont Mx. 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 \"              ETOH abuse ICD-10-CM: F10.10  ICD-9-CM: 305.00         Delirium tremens ([facility] ICD-10-CM: F10.231  ICD-9-CM: 291.0         Alcoholism /alcohol abuse ([facility] ICD-10-CM: F10.20  ICD-9-CM: 303.90         Accelerated hypertension ICD-10-CM: I10  ICD-9-CM: 401.0         Alcohol withdrawal seizure ([facility] ICD-10-CM: F10.239, R56.9  ICD-9-CM: 291.81, 780.39         Chronic alcoholism (HCC) ICD-10-CM: F10.20  ICD-9-CM: 303.90         ARF (acute renal failure) (HCC) ICD-10-CM: N17.9  ICD-9-CM: 584.9               Past Medical History:      has a past medical history of Alcoholism ([facility] Hypertension, Ill-defined condition, and Seizures ([facility] Past Surgical History:      has a past surgical history that includes hx hernia repair (4887'H) and colonoscopy (N/A, 8/20/2018).     Home Medications:     Prior to Admission medications    Medication Sig Start Date End Date Taking? Authorizing Provider   amLODIPine (NORVASC) 10 mg tablet Take 10 mg by mouth daily. Yes Other, MD Debi   naproxen sodium (ALEVE) 220 mg tablet Take 220 mg by mouth as needed. Provider, Historical   Omega-3 Fatty Acids (FISH OIL) 500 mg cap Take  by mouth daily. Provider, Historical       Allergies/Social/Family History: Allergies   Allergen Reactions    Lisinopril Angioedema      Social History     Tobacco Use    Smoking status: Never Smoker    Smokeless tobacco: Never Used   Substance Use Topics    Alcohol use: Yes     Comment: - pint vodka/day +/- beer; no drinks since 2017      Family History   Problem Relation Age of Onset    Hypertension Other     Breast Cancer Other     Cancer Mother         breast    No Known Problems Father     HIV/AIDS Brother     Alcohol abuse Brother        Review of Systems:     A comprehensive review of systems was negative except for: Constitutional: positive for chills  Respiratory: positive for tachypnea  Gastrointestinal: positive for nausea and vomiting  Neurological: positive for seizures    Objective:   Vital Signs:  Visit Vitals  BP (!) 152/97   Pulse (!) 120   Temp 99.4 °F (37.4 °C)   Resp (!) 31   Ht 5' 8\" (1.727 m)   Wt 87.4 kg (192 lb 10.9 oz)   SpO2 94%   BMI 29.30 kg/m²      O2 Device: Room air Temp (24hrs), Av.4 °F (37.4 °C), Min:99.3 °F (37.4 °C), Max:99.4 °F (37.4 °C)             Intake/Output:     Intake/Output Summary (Last 24 hours) at 2021 0214  Last data filed at 2021 0119  Gross per 24 hour   Intake 1000 ml   Output    Net 1000 ml       Physical Exam:    General:  Alert, cooperative, well noursished, well developed, appears stated age, mild-mod distress   Eyes:  Sclera anicteric. Pupils equally round and reactive to light.    Mouth/Throat: Mucous membranes normal, oral pharynx clear   Neck: Supple   Lungs:   Clear to auscultation bilaterally, good effort   CV:  Regular rate and rhythm,no murmur, click, rub or gallop   Abdomen:   Soft, non-tender. non-distended. umbilical hernia   Extremities: No cyanosis or edema   Skin: Skin color, texture, turgor normal. no acute rash or lesions   Lymph nodes: Cervical and supraclavicular normal   Musculoskeletal: No swelling or deformity   Lines/Devices:  Intact, no erythema, drainage or tenderness   Psych: Alert and oriented, normal mood affect given the setting       LABS AND  DATA: Personally reviewed  Recent Labs     01/03/21  2316   WBC 6.3   HGB 14.7   HCT 43.5        Recent Labs     01/03/21  2316   *   K 3.7   CL 90*   CO2 14*   BUN 7   CREA 1.39*   *   CA 9.4   MG 2.0     Recent Labs     01/03/21  2316   AP 72   TP 8.6*   ALB 4.1   GLOB 4.5*     No results for input(s): INR, PTP, APTT, INREXT, INREXT in the last 72 hours. Recent Labs     01/04/21  0031   PHI 7.48*   PCO2I 28.3*   PO2I 74*   FIO2I 21     No results for input(s): CPK, CKMB, TROIQ, BNPP in the last 72 hours. Hemodynamics:   PAP:   CO:     Wedge:   CI:     CVP:    SVR:       PVR:       Ventilator Settings:  Mode Rate Tidal Volume Pressure FiO2 PEEP                    Peak airway pressure:      Minute ventilation:          MEDS: Reviewed    Chest X-Ray:  CXR Results  (Last 48 hours)    None        EKG  1/3/21    Sinus tachycardia   Inferior infarct (cited on or before 03-JAN-2021)   Cannot rule out Anterior infarct , age undetermined   When compared with ECG of 25-SEP-2020 06:47,   ST now depressed in Anterior leads   T wave inversion now evident in Inferior leads     ECHO:  none    Multidisciplinary Rounds Completed:  Pending    ABCDEF Bundle/Checklist Completed:  Yes    SPECIAL EQUIPMENT  None    DISPOSITION  Stay in ICU    CRITICAL CARE CONSULTANT NOTE  I had a face to face encounter with the patient, reviewed and interpreted patient data including clinical events, labs, images, vital signs, I/O's, and examined patient.   I have discussed the case and the plan and management of the patient's care with the consulting services, the bedside nurses and the respiratory therapist.      NOTE OF PERSONAL INVOLVEMENT IN CARE   This patient has a high probability of imminent, clinically significant deterioration, which requires the highest level of preparedness to intervene urgently. I participated in the decision-making and personally managed or directed the management of the following life and organ supporting interventions that required my frequent assessment to treat or prevent imminent deterioration. I personally spent 60 minutes of critical care time. This is time spent at this critically ill patient's bedside actively involved in patient care as well as the coordination of care and discussions with the patient's family. This does not include any procedural time which has been billed separately.     Jodee Gamez [facility]  1/4/2021

## 2021-01-04 NOTE — ED PROVIDER NOTES
EMERGENCY DEPARTMENT HISTORY AND PHYSICAL EXAM      Please note that this dictation was completed with the assistance of \"Dragon\", the computer voice recognition software. Quite often unanticipated grammatical, syntax, homophones, and other interpretive errors are inadvertently transcribed by the computer software. Please disregard these errors and any errors that have escaped final proofreading. Thank you. Patient Name: Wil Abdalla Sr.  : 1955  MRN: [id number]  History of Presenting Illness     Chief Complaint   Patient presents with    Seizure     ED visit d.t seizure activity, grand mal, lasting 2 min, onset of sxs, 15 mins PTA - hx of sz due to ETOH withdrawal - on HTN medications - A/O4 - tachycardiac / low fever - last ETOH drink 1 day ago         History Provided By: Patient and EMS    HPI: Brayden Ya., 72 y.o. male with past medical history as documented below presents to the ED with c/o of acute onset of witnessed grand mal seizure occurring about 1 hour ago. Patient reports a history of alcohol withdrawal seizures, last drink was yesterday. Reports last admission for seizures approximately several months ago. Denies any previous history of seizures. Patient denies any falls or trauma. Reports a mild headache but otherwise no other complaints. Patient reports feeling very lethargic. Denies any other coingestions or drug use. Upon EMS arrival, patient was slightly postictal, tachycardic but otherwise stable vital signs. Pt denies any other alleviating or exacerbating factors. Additionally, pt specifically denies any recent fever, chills, nausea, vomiting, abdominal pain, CP, SOB, lightheadedness, dizziness, numbness, weakness, lower extremity swelling, heart palpitations, urinary sxs, diarrhea, constipation, melena, hematochezia, cough, or congestion. There are no other complaints, changes or physical findings pertinent to the HPI at this time.     PCP: Hilda Davenport, MD    Past History   Past Medical History:  Past Medical History:   Diagnosis Date    Alcoholism ([facility]     Hypertension     Ill-defined condition     Hernia     Seizures ([facility] [facility]     Withdrawal        Past Surgical History:  Past Surgical History:   Procedure Laterality Date    COLONOSCOPY N/A 8/20/2018    COLONOSCOPY performed by Clarisa Arce MD at \[facility]\"" ENDOSCOPY    HX HERNIA REPAIR  1960's       Family History:  Family History   Problem Relation Age of Onset    Hypertension Other     Breast Cancer Other     Cancer Mother         breast    No Known Problems Father     HIV/AIDS Brother     Alcohol abuse Brother        Social History:  Social History     Tobacco Use    Smoking status: Never Smoker    Smokeless tobacco: Never Used   Substance Use Topics    Alcohol use: Yes     Comment: 1/2-1 pint vodka/day +/- beer; no drinks since 11/2017    Drug use: No       Allergies: Allergies   Allergen Reactions    Lisinopril Angioedema       Current Medications:  No current facility-administered medications on file prior to encounter. Current Outpatient Medications on File Prior to Encounter   Medication Sig Dispense Refill    amLODIPine (NORVASC) 10 mg tablet Take 10 mg by mouth daily.  naproxen sodium (ALEVE) 220 mg tablet Take 220 mg by mouth as needed.  Omega-3 Fatty Acids (FISH OIL) 500 mg cap Take  by mouth daily. Review of Systems   Review of Systems   Constitutional: Positive for fatigue. Negative for chills and fever. HENT: Negative. Negative for congestion, facial swelling, rhinorrhea, sore throat, trouble swallowing and voice change. Eyes: Negative. Respiratory: Negative. Negative for apnea, cough, chest tightness, shortness of breath and wheezing. Cardiovascular: Positive for palpitations. Negative for chest pain and leg swelling. Gastrointestinal: Negative.   Negative for abdominal distention, abdominal pain, blood in stool, constipation, diarrhea, nausea and vomiting. Endocrine: Negative. Negative for cold intolerance, heat intolerance and polyuria. Genitourinary: Negative. Negative for difficulty urinating, dysuria, flank pain, frequency, hematuria and urgency. Musculoskeletal: Negative. Negative for arthralgias, back pain, myalgias, neck pain and neck stiffness. Skin: Negative. Negative for color change and rash. Neurological: Positive for seizures and weakness. Negative for dizziness, syncope, facial asymmetry, speech difficulty, light-headedness, numbness and headaches. Hematological: Negative. Does not bruise/bleed easily. Psychiatric/Behavioral: Negative. Negative for confusion and self-injury. The patient is not nervous/anxious. Physical Exam   Physical Exam  Vitals signs and nursing note reviewed. Constitutional:       General: He is in acute distress. Appearance: He is well-developed. He is ill-appearing, toxic-appearing and diaphoretic. HENT:      Head: Normocephalic and atraumatic. Mouth/Throat:      Pharynx: No posterior oropharyngeal erythema. Eyes:      Conjunctiva/sclera: Conjunctivae normal.      Pupils: Pupils are equal, round, and reactive to light. Neck:      Musculoskeletal: Normal range of motion. Cardiovascular:      Rate and Rhythm: Regular rhythm. Tachycardia present. Heart sounds: Normal heart sounds. No murmur. No friction rub. No gallop. Pulmonary:      Effort: Pulmonary effort is normal. No respiratory distress. Breath sounds: Normal breath sounds. No wheezing or rales. Chest:      Chest wall: No tenderness. Abdominal:      General: Bowel sounds are normal. There is no distension. Palpations: Abdomen is soft. There is no mass. Tenderness: There is no abdominal tenderness. There is no guarding or rebound. Musculoskeletal: Normal range of motion. General: No tenderness or deformity. Skin:     General: Skin is warm. Findings: No rash.    Neurological: Mental Status: He is alert and oriented to person, place, and time. Cranial Nerves: No cranial nerve deficit. Motor: No abnormal muscle tone. Coordination: Coordination normal.      Deep Tendon Reflexes: Reflexes normal.      Comments: Bilateral upper extremity tremors, otherwise nonfocal exam.   Psychiatric:         Behavior: Behavior is cooperative. Diagnostic Study Results     Labs -   I have personally reviewed and interpreted all laboratory results. Recent Results (from the past 24 hour(s))   EKG, 12 LEAD, INITIAL    Collection Time: 01/03/21 10:58 PM   Result Value Ref Range    Ventricular Rate 132 BPM    Atrial Rate 132 BPM    P-R Interval 146 ms    QRS Duration 82 ms    Q-T Interval 286 ms    QTC Calculation (Bezet) 423 ms    Calculated P Axis 31 degrees    Calculated R Axis 77 degrees    Calculated T Axis -12 degrees    Diagnosis       Sinus tachycardia  Inferior infarct (cited on or before 03-JAN-2021)  Cannot rule out Anterior infarct , age undetermined  When compared with ECG of 25-SEP-2020 06:47,  ST now depressed in Anterior leads  T wave inversion now evident in Inferior leads     CBC WITH AUTOMATED DIFF    Collection Time: 01/03/21 11:16 PM   Result Value Ref Range    WBC 6.3 4.1 - 11.1 K/uL    RBC 4.40 4. 10 - 5.70 M/uL    HGB 14.7 12.1 - 17.0 g/dL    HCT 43.5 36.6 - 50.3 %    MCV 98.9 80.0 - 99.0 FL    MCH 33.4 26.0 - 34.0 PG    MCHC 33.8 30.0 - 36.5 g/dL    RDW 11.6 11.5 - 14.5 %    PLATELET 358 095 - 834 K/uL    MPV 9.3 8.9 - 12.9 FL    NRBC 0.0 0  WBC    ABSOLUTE NRBC 0.00 0.00 - 0.01 K/uL    NEUTROPHILS 83 (H) 32 - 75 %    LYMPHOCYTES 7 (L) 12 - 49 %    MONOCYTES 10 5 - 13 %    EOSINOPHILS 0 0 - 7 %    BASOPHILS 0 0 - 1 %    IMMATURE GRANULOCYTES 0 0.0 - 0.5 %    ABS. NEUTROPHILS 5.3 1.8 - 8.0 K/UL    ABS. LYMPHOCYTES 0.4 (L) 0.8 - 3.5 K/UL    ABS. MONOCYTES 0.6 0.0 - 1.0 K/UL    ABS. EOSINOPHILS 0.0 0.0 - 0.4 K/UL    ABS. BASOPHILS 0.0 0.0 - 0.1 K/UL    ABS. IMM. GRANS. 0.0 0.00 - 0.04 K/UL    DF SMEAR SCANNED      RBC COMMENTS MACROCYTOSIS  1+       METABOLIC PANEL, COMPREHENSIVE    Collection Time: 01/03/21 11:16 PM   Result Value Ref Range    Sodium 126 (L) 136 - 145 mmol/L    Potassium 3.7 3.5 - 5.1 mmol/L    Chloride 90 (L) 97 - 108 mmol/L    CO2 14 (LL) 21 - 32 mmol/L    Anion gap 22 (H) 5 - 15 mmol/L    Glucose 157 (H) 65 - 100 mg/dL    BUN 7 6 - 20 MG/DL    Creatinine 1.39 (H) 0.70 - 1.30 MG/DL    BUN/Creatinine ratio 5 (L) 12 - 20      GFR est AA >60 >60 ml/min/1.73m2    GFR est non-AA 51 (L) >60 ml/min/1.73m2    Calcium 9.4 8.5 - 10.1 MG/DL    Bilirubin, total 2.0 (H) 0.2 - 1.0 MG/DL    ALT (SGPT) 58 12 - 78 U/L    AST (SGOT) 71 (H) 15 - 37 U/L    Alk. phosphatase 72 45 - 117 U/L    Protein, total 8.6 (H) 6.4 - 8.2 g/dL    Albumin 4.1 3.5 - 5.0 g/dL    Globulin 4.5 (H) 2.0 - 4.0 g/dL    A-G Ratio 0.9 (L) 1.1 - 2.2     ETHYL ALCOHOL    Collection Time: 01/03/21 11:16 PM   Result Value Ref Range    ALCOHOL(ETHYL),SERUM <10 <10 MG/DL   MAGNESIUM    Collection Time: 01/03/21 11:16 PM   Result Value Ref Range    Magnesium 2.0 1.6 - 2.4 mg/dL   POC LACTIC ACID    Collection Time: 01/04/21 12:09 AM   Result Value Ref Range    Lactic Acid (POC) 8.21 (HH) 0.40 - 2.00 mmol/L   POC EG7    Collection Time: 01/04/21 12:31 AM   Result Value Ref Range    Calcium, ionized (POC) 1.08 (L) 1.12 - 1.32 mmol/L    FIO2 (POC) 21 %    pH (POC) 7.48 (H) 7.35 - 7.45      pCO2 (POC) 28.3 (L) 35.0 - 45.0 MMHG    pO2 (POC) 74 (L) 80 - 100 MMHG    HCO3 (POC) 20.9 (L) 22 - 26 MMOL/L    Base deficit (POC) 3 mmol/L    sO2 (POC) 96 92 - 97 %    Site OTHER      Device: ROOM AIR      Allens test (POC) N/A      Specimen type (POC) VENOUS BLOOD      Total resp.  rate 20     URINALYSIS W/ REFLEX CULTURE    Collection Time: 01/04/21  1:42 AM    Specimen: Urine   Result Value Ref Range    Color YELLOW/STRAW      Appearance CLEAR CLEAR      Specific gravity <1.005 1.003 - 1.030    pH (UA) 6.0 5.0 - 8.0 Protein Negative NEG mg/dL    Glucose Negative NEG mg/dL    Ketone Negative NEG mg/dL    Bilirubin Negative NEG      Blood TRACE (A) NEG      Urobilinogen 0.2 0.2 - 1.0 EU/dL    Nitrites Negative NEG      Leukocyte Esterase Negative NEG      WBC 0-4 0 - 4 /hpf    RBC 0-5 0 - 5 /hpf    Epithelial cells FEW FEW /lpf    Bacteria Negative NEG /hpf    UA:UC IF INDICATED CULTURE NOT INDICATED BY UA RESULT CNI      Hyaline cast 0-2 0 - 5 /lpf   LACTIC ACID    Collection Time: 01/04/21  2:28 AM   Result Value Ref Range    Lactic acid 2.4 (HH) 0.4 - 2.0 MMOL/L   METABOLIC PANEL, BASIC    Collection Time: 01/04/21  2:28 AM   Result Value Ref Range    Sodium 133 (L) 136 - 145 mmol/L    Potassium 3.3 (L) 3.5 - 5.1 mmol/L    Chloride 100 97 - 108 mmol/L    CO2 25 21 - 32 mmol/L    Anion gap 8 5 - 15 mmol/L    Glucose 108 (H) 65 - 100 mg/dL    BUN 6 6 - 20 MG/DL    Creatinine 0.71 0.70 - 1.30 MG/DL    BUN/Creatinine ratio 8 (L) 12 - 20      GFR est AA >60 >60 ml/min/1.73m2    GFR est non-AA >60 >60 ml/min/1.73m2    Calcium 8.5 8.5 - 10.1 MG/DL   CBC WITH AUTOMATED DIFF    Collection Time: 01/04/21  2:28 AM   Result Value Ref Range    WBC 7.4 4.1 - 11.1 K/uL    RBC 3.91 (L) 4.10 - 5.70 M/uL    HGB 13.1 12.1 - 17.0 g/dL    HCT 37.9 36.6 - 50.3 %    MCV 96.9 80.0 - 99.0 FL    MCH 33.5 26.0 - 34.0 PG    MCHC 34.6 30.0 - 36.5 g/dL    RDW 11.6 11.5 - 14.5 %    PLATELET 100 726 - 126 K/uL    MPV 9.6 8.9 - 12.9 FL    NRBC 0.0 0  WBC    ABSOLUTE NRBC 0.00 0.00 - 0.01 K/uL    NEUTROPHILS 82 (H) 32 - 75 %    LYMPHOCYTES 7 (L) 12 - 49 %    MONOCYTES 10 5 - 13 %    EOSINOPHILS 0 0 - 7 %    BASOPHILS 0 0 - 1 %    IMMATURE GRANULOCYTES 0 0.0 - 0.5 %    ABS. NEUTROPHILS 6.0 1.8 - 8.0 K/UL    ABS. LYMPHOCYTES 0.5 (L) 0.8 - 3.5 K/UL    ABS. MONOCYTES 0.8 0.0 - 1.0 K/UL    ABS. EOSINOPHILS 0.0 0.0 - 0.4 K/UL    ABS. BASOPHILS 0.0 0.0 - 0.1 K/UL    ABS. IMM.  GRANS. 0.0 0.00 - 0.04 K/UL    DF AUTOMATED     PHOSPHORUS    Collection Time: 01/04/21  2:28 AM   Result Value Ref Range    Phosphorus 2.1 (L) 2.6 - 4.7 MG/DL   MAGNESIUM    Collection Time: 01/04/21  2:28 AM   Result Value Ref Range    Magnesium 2.1 1.6 - 2.4 mg/dL   SAMPLES BEING HELD    Collection Time: 01/04/21  2:28 AM   Result Value Ref Range    SAMPLES BEING HELD GOL     COMMENT        Add-on orders for these samples will be processed based on acceptable specimen integrity and analyte stability, which may vary by analyte. EKG, 12 LEAD, INITIAL    Collection Time: 01/04/21  2:38 AM   Result Value Ref Range    Ventricular Rate 119 BPM    Atrial Rate 119 BPM    P-R Interval 156 ms    QRS Duration 84 ms    Q-T Interval 318 ms    QTC Calculation (Bezet) 447 ms    Calculated P Axis 30 degrees    Calculated R Axis 53 degrees    Calculated T Axis 52 degrees    Diagnosis       Sinus tachycardia  Possible Anterolateral infarct , age undetermined  When compared with ECG of 03-JAN-2021 22:58,  MANUAL COMPARISON REQUIRED, DATA IS UNCONFIRMED         Radiologic Studies -   I have personally reviewed and interpreted all imaging studies and agree with radiology interpretation and report. XR CHEST PORT   Final Result   IMPRESSION:      No acute process. CT HEAD WO CONT   Final Result   IMPRESSION:    No acute intracranial abnormality. CT Results  (Last 48 hours)               01/03/21 2323  CT HEAD WO CONT Final result    Impression:  IMPRESSION:    No acute intracranial abnormality. Narrative:  EXAM:  CT head without contrast       INDICATION: Seizure       COMPARISON: CT 8/29/2017       TECHNIQUE: Axial noncontrast head CT from foramen magnum to vertex. Coronal and   sagittal reformatted images were obtained. CT dose reduction was achieved   through use of a standardized protocol tailored for this examination and   automatic exposure control for dose modulation. FINDINGS:  There is diffuse age-related parenchymal volume loss.  The ventricles   and sulci are age-appropriate without hydrocephalus. There is no mass effect or   midline shift. There is no intracranial hemorrhage or extra-axial fluid   collection. Scattered foci of low attenuation in the periventricular white   matter represent stable chronic microvascular ischemic changes. There is a   stable chronic left basal ganglia lacunar infarct. The basal cisterns are   patent. The osseous structures are intact. The visualized paranasal sinuses and mastoid   air cells are clear. CXR Results  (Last 48 hours)               01/04/21 0246  XR CHEST PORT Final result    Impression:  IMPRESSION:       No acute process. Narrative:  EXAM:  XR CHEST PORT       INDICATION: Seizure       COMPARISON: 7/9/2017       TECHNIQUE: Portable AP semiupright chest view at 0238 hours       FINDINGS: The cardiomediastinal and hilar contours are within normal limits. The   pulmonary vasculature is within normal limits. The lungs and pleural spaces are clear. There is no pneumothorax. The visualized   bones and upper abdomen are age-appropriate. Medical Decision Making   I reviewed the vital signs, available nursing notes, past medical history, past surgical history, family history and social history. Vital Signs-Reviewed the patient's vital signs.   Patient Vitals for the past 24 hrs:   Temp Pulse Resp BP SpO2   01/04/21 0359 98.6 °F (37 °C) (!) 103 30 (!) 136/93 97 %   01/04/21 0130  (!) 120 (!) 31 (!) 152/97 94 %   01/04/21 0115  (!) 118 27 (!) 144/96 94 %   01/04/21 0100  (!) 117 (!) 38 139/88 94 %   01/04/21 0045  (!) 124 (!) 32 (!) 159/107 96 %   01/04/21 0028 99.4 °F (37.4 °C) (!) 114 (!) 31 (!) 146/92 96 %   01/03/21 2300  (!) 133 24 (!) 137/108 97 %   01/03/21 2259 99.3 °F (37.4 °C) (!) 133 18 (!) 142/95 97 %       Pulse Oximetry Analysis - 97% on RA    Cardiac Monitor:   Rate: 133 bpm  Rhythm: Sinus Tachycardia      ED EKG interpretation:  Rhythm: sinus tachycardia; and regular . Rate (approx.): 119; Axis: normal; P wave: normal; QRS interval: normal ; ST/T wave: normal; Other findings: normal. This EKG was interpreted by Hoa Chaney M.D. Records Reviewed: Nursing Notes, Old Medical Records, Previous electrocardiograms, Previous Radiology Studies and Previous Laboratory Studies    Provider Notes (Medical Decision Making):   Patient presents after a seizure, patient is tachycardic and high concern for alcohol withdrawal.  Will check CIWA score and give IV goody bag and IV Ativan. Patient will likely need admission given previous history of alcohol withdrawal.  Will check labs, CT imaging and reassessment. DDx: infection, anemia, electrolyte anomoly (hypo or hyperkalemia, hypomagnesemia), hypothyroid, dehydration, depression, CA, ACS. Given seizure, I did discuss with the patient about driving restrictions and that he is not allowed to drive for 6 months according to the [place]. ED Course:   I am the first provider for this patient's ED visit today. Initial assessment performed. I discussed presenting problems, concerns and my formulated plan for today's visit with the patient and any available family members at bedside. I encouraged them to ask questions as they arise throughout the visit. ALCOHOL/SUBSTANCE ABUSE COUNSELING:  Upon evaluation, pt endorsed recent alcohol/illicit drug use. For approximately 7 minutes, pt has been counseled on the dangers of alcohol and illicit drug use on their health, and they were encouraged to quit as soon as possible in order to decrease further risks to their health. Pt has conveyed their understanding of the risks involved should they continue to use these products. I reviewed our electronic medical record system for any past medical records that were available that may contribute to the patient's current condition, the nursing notes and vital signs from today's visit.   Nery Fam MD    ED Orders Placed :  Orders Placed This Encounter    CT HEAD WO CONT    XR CHEST PORT    Hold Sample    CBC WITH AUTOMATED DIFF    METABOLIC PANEL, COMPREHENSIVE    URINALYSIS W/ REFLEX CULTURE    BLOOD ALCOHOL (Ethyl Alcohol)    MAGNESIUM    VENOUS BLOOD GAS    POC EG7    LACTIC ACID    METABOLIC PANEL, BASIC    CBC WITH AUTOMATED DIFF    ELECTROLYTES    LACTIC ACID    PHOSPHORUS    MAGNESIUM    SAMPLES BEING HELD    METABOLIC PANEL, BASIC    LACTIC ACID    MAGNESIUM    PHOSPHORUS    DIET NPO    CARDIAC/RESPIRATORY MONITORING    POC LACTIC ACID    NOTIFY PROVIDER: SPECIFY Notify provider on pt's arrival to floor ONE TIME STAT    INTAKE AND OUTPUT    VITAL SIGNS PER UNIT ROUTINE    VITAL SIGNS    WEIGH PATIENT    INTAKE AND OUTPUT    APPLY/MAINTAIN SEQUENTIAL COMPRESSION DEVICE    NOTIFY PROVIDER: SPECIFY Notify physician for pulse less than 50 or greater than 120, respiratory rate less than 12 or greater than 25, oral temperature greater than 101.3 F (53.2 C) , systolic BP less than 90 or greater than 678, diastolic BP les. ..  ACTIVITY AS TOLERATED W/ASSIST    OUT OF BED IN CHAIR    ELEVATE HEAD OF BED    NURSING-MISCELLANEOUS: Monitor for signs/symptoms of urinary retention CONTINUOUS    CIWA-AR MONITORING    NOTIFY PROVIDER: [address] [place] [address] NOTIFY PROVIDER:  STATUS    NURSING-MISCELLANEOUS: Hold all sedatives if respiratory rate is less than 10. Monitor pulse oximetry and notify physician. CONTINUOUS    NURSING-MISCELLANEOUS: If PRN medications given, reassess vital signs and CIWA-Ar in 1 hour.  CONTINUOUS    OXYGEN CANNULA    FULL CODE    IP CONSULT TO INTENSIVIST    IP CONSULT TO RESPIRATORY CARE    OXIMETRY, SPOT CHECK - PRIOR TO INITIATION OF SEDATION    POC LACTIC ACID    EKG 12 LEAD INITIAL    EKG, 12 LEAD, INITIAL    INSERT PERIPHERAL IV ONE TIME STAT    INSERT PERIPHERAL IV ONE TIME STAT    IV SITE CARE CONTINUOUS STAT    SEIZURE PRECAUTIONS    0.9% sodium chloride 1,000 mL with mvi, adult no. 4 with vit K 10 mL, thiamine 246 mg, folic acid 1 mg infusion    LORazepam (ATIVAN) injection 1 mg    amLODIPine (NORVASC) 10 mg tablet    sodium chloride 0.9 % bolus infusion 1,000 mL    sodium chloride (NS) flush 5-40 mL    sodium chloride (NS) flush 5-40 mL    sodium chloride (NS) flush 5-40 mL    sodium chloride (NS) flush 5-40 mL    OR Linked Order Group     acetaminophen (TYLENOL) tablet 650 mg     acetaminophen (TYLENOL) suppository 650 mg    polyethylene glycol (MIRALAX) packet 17 g    OR Linked Order Group     promethazine (PHENERGAN) tablet 12.5 mg     ondansetron (ZOFRAN) injection 4 mg    DISCONTD: enoxaparin (LOVENOX) injection 40 mg    polyethylene glycol (MIRALAX) packet 17 g    senna-docusate (PERICOLACE) 8.6-50 mg per tablet 1 Tab    LORazepam (ATIVAN) injection 2 mg    dextrose 5% and 0.9% NaCl infusion    LORazepam (ATIVAN) injection 2 mg    sodium chloride 0.9 % bolus infusion 2,015 mL    folic acid (FOLVITE) tablet 1 mg    thiamine mononitrate (B-1) tablet 100 mg    multivitamin, tx-iron-ca-min (THERA-M w/ IRON) tablet 1 Tab    heparin (porcine) injection 5,000 Units    INITIAL PHYSICIAN ORDER: INPATIENT Intensive Care; 4.  Patient requires ICU level of care interventions (further clarification in H&P documentation)       ED Medications Administered:  Medications   sodium chloride (NS) flush 5-40 mL (10 mL IntraVENous Given 1/4/21 0233)   sodium chloride (NS) flush 5-40 mL (has no administration in time range)   sodium chloride (NS) flush 5-40 mL (has no administration in time range)   sodium chloride (NS) flush 5-40 mL (has no administration in time range)   acetaminophen (TYLENOL) tablet 650 mg (has no administration in time range)     Or   acetaminophen (TYLENOL) suppository 650 mg (has no administration in time range)   polyethylene glycol (MIRALAX) packet 17 g (has no administration in time range)   promethazine (PHENERGAN) tablet 12.5 mg (has no administration in time range)     Or   ondansetron (ZOFRAN) injection 4 mg (has no administration in time range)   polyethylene glycol (MIRALAX) packet 17 g (has no administration in time range)   senna-docusate (PERICOLACE) 8.6-50 mg per tablet 1 Tab (has no administration in time range)   LORazepam (ATIVAN) injection 2 mg (has no administration in time range)   dextrose 5% and 0.9% NaCl infusion (125 mL/hr IntraVENous New Bag 4/7/52 8108)   folic acid (FOLVITE) tablet 1 mg (has no administration in time range)   thiamine mononitrate (B-1) tablet 100 mg (has no administration in time range)   multivitamin, tx-iron-ca-min (THERA-M w/ IRON) tablet 1 Tab (has no administration in time range)   heparin (porcine) injection 5,000 Units (has no administration in time range)   0.9% sodium chloride 1,000 mL with mvi, adult no. 4 with vit K 10 mL, thiamine 222 mg, folic acid 1 mg infusion ( IntraVENous IV Completed 1/4/21 0500)   LORazepam (ATIVAN) injection 1 mg (1 mg IntraVENous Given 1/3/21 2339)   sodium chloride 0.9 % bolus infusion 1,000 mL (0 mL IntraVENous IV Completed 1/4/21 0119)   LORazepam (ATIVAN) injection 2 mg (2 mg IntraVENous Given 1/4/21 0226)   sodium chloride 0.9 % bolus infusion 1,000 mL (0 mL IntraVENous IV Completed 1/4/21 0358)         Progress Note:  Notified by nursing of very elevated CIWA score 17, will give IV Ativan and reassess. High concern of alcohol withdrawal at this time. Progress Note:  Notified by nursing of elevated lactic acid at 8.21. Suspect related to seizure versus dehydration, patient getting IVF bolus and additional goody bag. Progress Note:  Sodium is 126, potassium 3.7, bicarb 14 with elevated anion gap of 22. Patient also has mild CLAUDIA 1.39, fluid resuscitation continued. Progress Note:  Patient given fluid bolus for hyponatremia, given acidosis will check blood gas. Progress Note:  Venous blood gas that showed pH 7.48, PCO2 20.3, PO2 74, bicarb 20.9.     Consult Note:  Nicky Hammonds MD spoke with Dr. Liliane Norton,   Specialty: Hospitalist  Discussed pt's hx, disposition, and available diagnostic and imaging results. Reviewed care plans. Agree with management and plan thus far. Consultant will evaluate pt for admission. He states since initial CIWA > 17 and given electrolyte derangements, will need to consult ICU for admission, may need precedex gtt. Consult Note:  Nicky Hammonds MD spoke with Carl Lott NP,   Specialty: ICU team  Discussed pt's hx, disposition, and available diagnostic and imaging results. Reviewed care plans. Agree with management and plan thus far. Consultant will evaluate pt for admission. CRITICAL CARE NOTE :  IMPENDING DETERIORATION -Airway, Respiratory, Cardiovascular, CNS, Metabolic, Renal and Hepatic  ASSOCIATED RISK FACTORS - Hypotension, Shock, Hypoxia, Dysrhythmia, Metabolic changes, Dehydration, Vascular Compromise and CNS Decompensation  MANAGEMENT- Bedside Assessment and Supervision of Care  INTERPRETATION -  Xrays, CT Scan, Blood Gases, ECG, Blood Pressure and Cardiac Output Measures   INTERVENTIONS - hemodynamic mngmt, Neurologic interventions , Metobolic interventions and management of acute alcohol withdrawal with seizure, severe electrolyte derangements including hyponatremia, CLAUDIA, metabolic acidosis, lactic acidosis requiring fluid resuscitation, multiple fluid boluses, IV Ativan and other benzodiazepines, frequent airway and neurovascular reassessments and monitoring and eventually ICU admission  CASE REVIEW - Hospitalist, Medical Sub-Specialist, Nursing, Family and Intensivist  TREATMENT RESPONSE -Improved  PERFORMED BY - Self    NOTES   :  I personally spent 75 minutes of critical care time. This is time spent at this critically ill patient's bedside actively involved in patient care as well as the coordination of care and discussions with the patient's family.  This includes time involved in lab review, consultations with specialist, family decision-making, bedside attention and documentation. During this entire length of time I was immediately available to the patient. This does not include any procedural time which has been billed separately. Critical Care: The reason for providing this level of medical care for this critically-ill patient was due to a critical illness that impaired one or more vital organ systems, such that there was a high probability of imminent or life-threatening deterioration in the patient's condition. This care involved the highest level of preparedness to intervene urgently. This care involved high complexity decision making to assess, manipulate, and support vital system functions, to treat this degree of vital organ system failure, and to prevent further life threatening deterioration of the patients condition requiring frequent assessments and interventions. Jany Penn MD    Disposition:   ADMIT  Patient is being admitted to the hospital.  The results of their tests and reasons for their admission have been discussed with them and/or available family. They convey agreement and understanding for the need to be admitted and for their admission diagnosis. Consultation has been made with the inpatient physician specialist for hospitalization. Diagnosis   Clinical Impression:  1. Alcohol withdrawal seizure with complication ([address]    2. Essential tremor    3. Lactic acidosis    4. Acute hyponatremia    5. CLAUDIA (acute kidney injury) ([address]    6. High anion gap metabolic acidosis    7. Accelerated hypertension        Attestation:  Genesis Self MD, am the attending of record for this patient. I personally performed the services described in this documentation on this date, 1/3/2021 for patientBrayden. . I have reviewed the chart and verified that the record is accurate and complete.

## 2021-01-04 NOTE — ED NOTES
Bedside shift change report given to Joe  (oncoming nurse) by Remi Stapleton (offgoing nurse). Report included the following information SBAR, ED Summary, MAR and Recent Results. 0830- Pt resting on stretcher in POC with call bell in reach. Pt remains on monitor x 3. VSS at this time. When this RN came in to the room NC was off patient. Pt maintaining O2 above 92%. Will continue to monitor. 12- Hospitalist at bedside. 0900- Updated Wife per pt request.     8834- Provided pt with breakfast tray. 1232- Provided pt with meal tray. When providing meal tray this RN noticed his left hand IV had been caught on a wire and pulled out. No bleeding at this time. 1343- Pt resting on stretcher in POC with call bell in reach. Pt remains on monitor x 3. VSS at this time. Offered pt more water. Pt denied. Will continue to monitor. 1409- Medicated pt per orders. Emptied urinal.    1443- TRANSFER - OUT REPORT:    Verbal report given to Chi Oates (name) on [place].  being transferred to PCU (unit) for routine progression of care       Report consisted of patients Situation, Background, Assessment and   Recommendations(SBAR). Information from the following report(s) SBAR, ED Summary, [facility] and Recent Results was reviewed with the receiving nurse. Lines:   Peripheral IV 01/04/21 Right Hand (Active)        Opportunity for questions and clarification was provided.       Patient transported with:   Registered Nurse

## 2021-01-04 NOTE — ED NOTES
Called lab to ensure CK add on can be run. Per , blood is there and will be run.     1105 Case Management at bedside evaluating pt.

## 2021-01-04 NOTE — PROGRESS NOTES
Non billable rounding. Do not bill    Admitted earlier this morning to intensive care    Downgraded to medical service    I am accepting assuming his care    Was admitted after a witnessed gran mal seizure. With history of alcohol withdrawal seizures     Seen and examined    Conscious alert and oriented. Mild tremors  BP slightly on high side and mildly tachycardic  Chest clear  Abdomen soft. Noted umbilical hernia    Long discussion at the bedside regarding risk on continuing with alcoholism. He had been sober for 3 years after his most recent admission in 2017 and had started drinking beer for the last 3 months. Interested in in-patient rehabilitation    No homicidal or suicidal ideations.       Passed bedside swallow evaluation  Start on regular diet  Gabapentin edilberto ordered  Clonidine 0.1 mg tid with holding parameters  Lorazepam if CIWA > 11  Thiamine high dose for today 500 mg IV tid 3 doses then 200 mg PO daily   CM to provide options for rehab

## 2021-01-04 NOTE — PROGRESS NOTES
Speech pathology  Orders received, chart reviewed. Patient in with etoh withdrawal and seizure. Patient passed STAND and was placed on a regular diet. Per chart, patient got both a breakfast and lunch tray. There are no speech/swallow concerns documented. Formal speech/swallow evaluation not indicated at this time. Will complete orders; however, please re-consult if any changes arise.      Thanks, Shyam Betancourt M.S. CCC-SLP

## 2021-01-05 VITALS
RESPIRATION RATE: 23 BRPM | SYSTOLIC BLOOD PRESSURE: 154 MMHG | OXYGEN SATURATION: 98 % | HEART RATE: 109 BPM | HEIGHT: 68 IN | TEMPERATURE: 98.1 F | WEIGHT: 192.68 LBS | BODY MASS INDEX: 29.2 KG/M2 | DIASTOLIC BLOOD PRESSURE: 108 MMHG

## 2021-01-05 PROBLEM — F10.10 ETOH ABUSE: Status: RESOLVED | Noted: 2021-01-04 | Resolved: 2021-01-05

## 2021-01-05 LAB
ANION GAP SERPL CALC-SCNC: 7 MMOL/L (ref 5–15)
BASOPHILS # BLD: 0 K/UL (ref 0–0.1)
BASOPHILS NFR BLD: 1 % (ref 0–1)
BUN SERPL-MCNC: 4 MG/DL (ref 6–20)
BUN/CREAT SERPL: 5 (ref 12–20)
CALCIUM SERPL-MCNC: 8.8 MG/DL (ref 8.5–10.1)
CHLORIDE SERPL-SCNC: 101 MMOL/L (ref 97–108)
CO2 SERPL-SCNC: 26 MMOL/L (ref 21–32)
CREAT SERPL-MCNC: 0.75 MG/DL (ref 0.7–1.3)
DIFFERENTIAL METHOD BLD: ABNORMAL
EOSINOPHIL # BLD: 0.1 K/UL (ref 0–0.4)
EOSINOPHIL NFR BLD: 2 % (ref 0–7)
ERYTHROCYTE [DISTWIDTH] IN BLOOD BY AUTOMATED COUNT: 11.5 % (ref 11.5–14.5)
GLUCOSE SERPL-MCNC: 77 MG/DL (ref 65–100)
HCT VFR BLD AUTO: 44.2 % (ref 36.6–50.3)
HGB BLD-MCNC: 14.6 G/DL (ref 12.1–17)
IMM GRANULOCYTES # BLD AUTO: 0 K/UL (ref 0–0.04)
IMM GRANULOCYTES NFR BLD AUTO: 0 % (ref 0–0.5)
LYMPHOCYTES # BLD: 1.1 K/UL (ref 0.8–3.5)
LYMPHOCYTES NFR BLD: 31 % (ref 12–49)
MAGNESIUM SERPL-MCNC: 2.2 MG/DL (ref 1.6–2.4)
MCH RBC QN AUTO: 33.6 PG (ref 26–34)
MCHC RBC AUTO-ENTMCNC: 33 G/DL (ref 30–36.5)
MCV RBC AUTO: 101.6 FL (ref 80–99)
MONOCYTES # BLD: 0.5 K/UL (ref 0–1)
MONOCYTES NFR BLD: 14 % (ref 5–13)
NEUTS SEG # BLD: 1.8 K/UL (ref 1.8–8)
NEUTS SEG NFR BLD: 51 % (ref 32–75)
NRBC # BLD: 0 K/UL (ref 0–0.01)
NRBC BLD-RTO: 0 PER 100 WBC
PHOSPHATE SERPL-MCNC: 2.2 MG/DL (ref 2.6–4.7)
PLATELET # BLD AUTO: 202 K/UL (ref 150–400)
PMV BLD AUTO: 9.5 FL (ref 8.9–12.9)
POTASSIUM SERPL-SCNC: 2.9 MMOL/L (ref 3.5–5.1)
RBC # BLD AUTO: 4.35 M/UL (ref 4.1–5.7)
SODIUM SERPL-SCNC: 134 MMOL/L (ref 136–145)
WBC # BLD AUTO: 3.5 K/UL (ref 4.1–11.1)

## 2021-01-05 PROCEDURE — 95816 EEG AWAKE AND DROWSY: CPT | Performed by: PSYCHIATRY & NEUROLOGY

## 2021-01-05 PROCEDURE — 74011250637 HC RX REV CODE- 250/637: Performed by: STUDENT IN AN ORGANIZED HEALTH CARE EDUCATION/TRAINING PROGRAM

## 2021-01-05 PROCEDURE — 80048 BASIC METABOLIC PNL TOTAL CA: CPT

## 2021-01-05 PROCEDURE — 74011250637 HC RX REV CODE- 250/637: Performed by: INTERNAL MEDICINE

## 2021-01-05 PROCEDURE — 74011250636 HC RX REV CODE- 250/636: Performed by: INTERNAL MEDICINE

## 2021-01-05 PROCEDURE — 85025 COMPLETE CBC W/AUTO DIFF WBC: CPT

## 2021-01-05 PROCEDURE — 83735 ASSAY OF MAGNESIUM: CPT

## 2021-01-05 PROCEDURE — 74011250637 HC RX REV CODE- 250/637: Performed by: NURSE PRACTITIONER

## 2021-01-05 PROCEDURE — 99223 1ST HOSP IP/OBS HIGH 75: CPT | Performed by: PSYCHIATRY & NEUROLOGY

## 2021-01-05 PROCEDURE — 36415 COLL VENOUS BLD VENIPUNCTURE: CPT

## 2021-01-05 PROCEDURE — 84100 ASSAY OF PHOSPHORUS: CPT

## 2021-01-05 RX ADMIN — GABAPENTIN 300 MG: 300 CAPSULE ORAL at 08:33

## 2021-01-05 RX ADMIN — POTASSIUM BICARBONATE 40 MEQ: 782 TABLET, EFFERVESCENT ORAL at 17:23

## 2021-01-05 RX ADMIN — Medication 200 MG: at 08:33

## 2021-01-05 RX ADMIN — ENOXAPARIN SODIUM 40 MG: 40 INJECTION SUBCUTANEOUS at 08:34

## 2021-01-05 RX ADMIN — CLONIDINE HYDROCHLORIDE 0.1 MG: 0.1 TABLET ORAL at 06:07

## 2021-01-05 RX ADMIN — Medication 10 ML: at 06:00

## 2021-01-05 RX ADMIN — GABAPENTIN 300 MG: 300 CAPSULE ORAL at 15:25

## 2021-01-05 RX ADMIN — POTASSIUM BICARBONATE 40 MEQ: 782 TABLET, EFFERVESCENT ORAL at 16:31

## 2021-01-05 RX ADMIN — Medication 10 ML: at 15:25

## 2021-01-05 RX ADMIN — CLONIDINE HYDROCHLORIDE 0.1 MG: 0.1 TABLET ORAL at 15:25

## 2021-01-05 RX ADMIN — FOLIC ACID 1 MG: 1 TABLET ORAL at 08:33

## 2021-01-05 RX ADMIN — MULTIPLE VITAMINS W/ MINERALS TAB 1 TABLET: TAB at 08:33

## 2021-01-05 NOTE — PROGRESS NOTES
1900: Bedside shift change report given to Curt Baker RN (oncoming nurse) by Lety Sanchez RN (offgoing nurse). Report included the following information SBAR, Kardex, Intake/Output and MAR.     1900: Patient in bed, resting quietly. Bed in lowest position, bed alarm on, call bell and phone in reach. Will monitor. 0700: End of Shift Note    Bedside shift change report given to Shelley Fontanez RN (oncoming nurse) by Unruly Wiley (offgoing nurse). Report included the following information SBAR, Kardex, Intake/Output and MAR    Shift worked:  Night     Shift summary and any significant changes:     Pt. States he is feeling back to his normal self today. Concerns for physician to address:       Zone phone for oncoming shift:          Activity:  Activity Level: Bed Rest  Number times ambulated in hallways past shift: 0  Number of times OOB to chair past shift: 0    Cardiac:   Cardiac Monitoring: Yes      Cardiac Rhythm: Normal sinus rhythm    Access:   Current line(s): PIV     Genitourinary:   Urinary status: voiding    Respiratory:   O2 Device: Room air  Chronic home O2 use?: NO  Incentive spirometer at bedside: YES     GI:  Last Bowel Movement Date: 01/03/21  Current diet:  DIET REGULAR  DIET NUTRITIONAL SUPPLEMENTS All Meals; Ensure Enlive  Passing flatus: YES  Tolerating current diet: YES       Pain Management:   Patient states pain is manageable on current regimen: YES    Skin:  Robinson Score: 20  Interventions: turn team, speciality bed, float heels, increase time out of bed, PT/OT consult, internal/external urinary devices and nutritional support     Patient Safety:  Fall Score:  Total Score: 1  Interventions: bed/chair alarm, assistive device (walker, cane, etc), gripper socks and pt to call before getting OOB       Length of Stay:  Expected LOS: - - -  Actual LOS: [address]

## 2021-01-05 NOTE — PROCEDURES
EEG REPORT    Patient Name: Licha Lares Sr.  : 1955  Age: 72 y.o. Ordering physician: Dr. Nata Choudhary  Date of EE2021  14:52 - 15:14  Diagnosis: seizure  Interpreting physician: Safia Aguirre D.O. FAAN    Procedure: EEG    CLINICAL INDICATION: The patient is a 72 y.o. male who is being evaluated for baseline electro cerebral activities and to rule out seizure focus. Current Facility-Administered Medications   Medication Dose Route Frequency    potassium bicarb-citric acid (EFFER-K) tablet 40 mEq  40 mEq Oral ONCE    sodium chloride (NS) flush 5-40 mL  5-40 mL IntraVENous Q8H    sodium chloride (NS) flush 5-40 mL  5-40 mL IntraVENous PRN    sodium chloride (NS) flush 5-40 mL  5-40 mL IntraVENous Q8H    sodium chloride (NS) flush 5-40 mL  5-40 mL IntraVENous PRN    acetaminophen (TYLENOL) tablet 650 mg  650 mg Oral Q6H PRN    Or    acetaminophen (TYLENOL) suppository 650 mg  650 mg Rectal Q6H PRN    polyethylene glycol (MIRALAX) packet 17 g  17 g Oral DAILY PRN    ondansetron (ZOFRAN) injection 4 mg  4 mg IntraVENous Q6H PRN    polyethylene glycol (MIRALAX) packet 17 g  17 g Oral DAILY    senna-docusate (PERICOLACE) 8.6-50 mg per tablet 1 Tab  1 Tab Oral BID    LORazepam (ATIVAN) injection 2 mg  2 mg IntraVENous S2K PRN    folic acid (FOLVITE) tablet 1 mg  1 mg Oral DAILY    multivitamin, tx-iron-ca-min (THERA-M w/ IRON) tablet 1 Tab  1 Tab Oral DAILY    enoxaparin (LOVENOX) injection 40 mg  40 mg SubCUTAneous Q24H    gabapentin (NEURONTIN) capsule 300 mg  300 mg Oral TID    [START ON 2021] gabapentin (NEURONTIN) capsule 300 mg  300 mg Oral BID    cloNIDine HCL (CATAPRES) tablet 0.1 mg  0.1 mg Oral Q8H    thiamine mononitrate (B-1) tablet 200 mg  200 mg Oral DAILY           DESCRIPTION OF PROCEDURE:     This is a digitally recorded electroencephalogram  Electrodes were applied in accordance with the international 10-20 system of electrode placement.     18 channels of scalp EEG are recorded  A channel was used for EoG  Another channel was used for ECG   The data is stored digitally and reviewed in reformatted montages for optimal display  EEG  was reviewed in both bipolar and referential montages    Description of Activity: The background of this recording contains a posteriorly-located occipital alpha rhythm of 9-10 hz that attenuates with eye opening. This was seen maximally over the posterior head region and was symmetric. There was a small amount of beta activity seen. Throughout the recording, there were no clear areas of focal slowing nor spike or spike-and-wave discharges seen. Hyperventilation was not performed. Photic stimulation produced no response in the posterior head regions. During drowsiness, there is attenuation of the underlying  frequency and slower theta activity occurs. During the recording, the patient did  not achieve stage II sleep      Clinical Interpretation: This EEG, performed during wakefulness and drowsiness, is normal.  There was no clear focal abnormalities or epileptiform activity. A normal EEG doesn't not rule out seizures. Clinical correlation recommended. OMAR German

## 2021-01-05 NOTE — PROGRESS NOTES
[address] [facility] with Wife once stable    *CM provided Pt with ETOH resources. -Medicare only covers 3-5 detox hospitalization. Does not cover Inpatient Substance Abuse Rehab.    -CM called [facility] [phone] and if Pt needs ETOH withdraw rehab than he can come to emergency room and they will hold him until he they have detox bed available. All Beds are currently filled at this time. - CM provided outpatient resource for [employer] at [facility]. -Provided resources for [employer]  -Provided resources for [employer]. *Wife to transport Pt home     5 pm   CM notifed that Pt will DC home this afternoon. CM called PCP, Dr. Elieser Green office and had to leave a  to schedule a follow up appt with Pt directly. 2:20 PM   Neuro has seen Pt and ordered an EEG. If EEG is normal Pt may DC home this evening or tomorrow? CM will continue to monitor discharge plan.      Anil Armijo, VALERIA   Ext [phone]

## 2021-01-05 NOTE — CONSULTS
Neurology Note    Patient ID:  Kenya Valdes  [id number]  72 y.o.  1955      Date of Consultation:  January 5, 2021    Referring Physician: Dr. Keily Jones    Reason for Consultation:  seizures    Subjective: I had a seizure       History of Present Illness:   Doreen Augustine Sr. is a 72 y.o. male who was admitted on 1/3/2021 after having developed a witnessed generalized seizure. He does have a history of prior seizures which the patient reports to me have been all related to alcohol withdrawal.  The patient states his last seizure was greater than 3 years ago. He was seen at [facility] and did have an EEG performed at that time which was normal.  Patient states that he began drinking approximately 3 months ago and he was a bit elusive in regards to the amount of alcohol that he is drinking. He did stop abruptly as he was trying to change his life and when sitting with his wife he felt a seizure was possibly coming on. He began to tremble more and the next thing he remembers is being in the ambulance on the way to the emergency department. He denies any urinary incontinence or tongue biting. He states that he is back to his normal self. He states he does have a longstanding history of a tremor in his upper extremities. He has had this for many years. His primary care doctor did discuss with him about possibly taking a medicine for this before but he was not interested. He also did take a seizure medicine. He believes it was Keppra but is uncertain but has not taken that for many years as well. He does feel that if he were to stay off alcohol that he would not have any more seizures. Currently, he denies any numbness, tingling, or pain. He feels that he is back to his normal self.     Past Medical History:   Diagnosis Date    Alcoholism ([address]     Hypertension     Ill-defined condition     Hernia     Seizures ([facility]     Withdrawal         Past Surgical History:   Procedure Laterality Date    COLONOSCOPY N/A 8/20/2018    COL[facility]z [facility] ENDOSCOPY    HX HERNIA REPAIR  1960's        Family History   Problem Relation Age of Onset    Hypertension Other     Breast Cancer Other     Cancer Mother         breast    No Known Problems Father     HIV/AIDS Brother     Alcohol abuse Brother         Social History     Tobacco Use    Smoking status: Never Smoker    Smokeless tobacco: Never Used   Substance Use Topics    Alcohol use: Yes     Comment: 1/2-1 pint vodka/day +/- beer; no drinks since 11/2017        Allergies   Allergen Reactions    Lisinopril Angioedema          Current Facility-Administered Medications   Medication Dose Route Frequency    sodium chloride (NS) flush 5-40 mL  5-40 mL IntraVENous Q8H    sodium chloride (NS) flush 5-40 mL  5-40 mL IntraVENous PRN    sodium chloride (NS) flush 5-40 mL  5-40 mL IntraVENous Q8H    sodium chloride (NS) flush 5-40 mL  5-40 mL IntraVENous PRN    acetaminophen (TYLENOL) tablet 650 mg  650 mg Oral Q6H PRN    Or    acetaminophen (TYLENOL) suppository 650 mg  650 mg Rectal Q6H PRN    polyethylene glycol (MIRALAX) packet 17 g  17 g Oral DAILY PRN    ondansetron (ZOFRAN) injection 4 mg  4 mg IntraVENous Q6H PRN    polyethylene glycol (MIRALAX) packet 17 g  17 g Oral DAILY    senna-docusate (PERICOLACE) 8.6-50 mg per tablet 1 Tab  1 Tab Oral BID    LORazepam (ATIVAN) injection 2 mg  2 mg IntraVENous Q0G PRN    folic acid (FOLVITE) tablet 1 mg  1 mg Oral DAILY    multivitamin, tx-iron-ca-min (THERA-M w/ IRON) tablet 1 Tab  1 Tab Oral DAILY    enoxaparin (LOVENOX) injection 40 mg  40 mg SubCUTAneous Q24H    gabapentin (NEURONTIN) capsule 300 mg  300 mg Oral TID    [START ON 1/7/2021] gabapentin (NEURONTIN) capsule 300 mg  300 mg Oral BID    cloNIDine HCL (CATAPRES) tablet 0.1 mg  0.1 mg Oral Q8H    thiamine mononitrate (B-1) tablet 200 mg  200 mg Oral DAILY         Review of Systems:    General, constitutional: Anxious. Tremor. Eyes, vision: negative  Ears, nose, throat: negative  Cardiovascular, heart: negative  Respiratory: negative  Gastrointestinal: negative  Genitourinary: negative  Musculoskeletal: negative  Skin and integumentary: negative  Psychiatric: negative  Endocrine: negative  Neurological: negative, except for HPI  Hematologic/lymphatic: negative  Allergy/immunology: negative      Objective:     Visit Vitals  BP (!) 141/91 (BP 1 Location: Left arm, BP Patient Position: Sitting)   Pulse (!) 101   Temp 98.2 °F (36.8 °C)   Resp 21   Ht 5' 8\" (1.727 m)   Wt 192 lb 10.9 oz (87.4 kg)   SpO2 98%   BMI 29.30 kg/m²       Physical Exam:    General:  appears well nourished in no acute distress  Neck: no carotid bruits  Lungs: clear to auscultation  Heart:  no murmurs, regular rate  Lower extremity: peripheral pulses palpable and no edema  Skin: intact    Neurological exam:    Awake, alert, oriented to person, place and time. Slightly pressured speech  Recent and remote memory were normal.  Provides vague history in regards to his alcohol intake  Attention and concentration were intact  Language was intact. There was no aphasia  Speech: no dysarthria  Fund of knowledge was preserved    Cranial nerves:   II-XII were tested    Perrrla  Fundoscopic examination revealed venous pulsations and no clear abnormalities  Visual fields were full  Eomi, no evidence of nystagmus  Facial sensation:  normal and symmetric  Facial motor: normal and symmetric  Hearing intact  SCM strength intact  Tongue: midline without fasciculations    Motor: Tone normal  pronator drift was absent    No evidence of fasciculations    Strength testing:   deltoid triceps biceps Wrist ext. Wrist flex. intrinsics Hip flex. Hip ext. Knee ext.   Knee flex Dorsi flex Plantar flex   Right 5 5 5 5 5 5 5 5 5 5 5 5   Left 5 5 5 5 5 5 5 5 5 5 5 5         Sensory:  Upper extremity: intact to pp, light touch, and vibration  Lower extremity: intact to pp, light touch, and vibration > 10 seconds at his ankles    Reflexes:    Right Left  Biceps  2 2  Triceps 2 2  Brachiorad. 2 2  Patella  2 2  Achilles 2 2    Plantar response:  flexor bilaterally      Cerebellar testing: He does not have a resting tremor. He does have quite significant bilateral upper extremity action and intention base tremor. He states that this is close to his baseline and does not feel that it is any worse. He feels that it was worse in the distant past.    Gait: slow, steady. Labs:     Lab Results   Component Value Date/Time    Hemoglobin A1c 5.0 12/04/2015 03:52 AM    Sodium 134 (L) 01/05/2021 04:33 AM    Potassium 2.9 (L) 01/05/2021 04:33 AM    Chloride 101 01/05/2021 04:33 AM    Glucose 77 01/05/2021 04:33 AM    BUN 4 (L) 01/05/2021 04:33 AM    Creatinine 0.75 01/05/2021 04:33 AM    Calcium 8.8 01/05/2021 04:33 AM    WBC 3.5 (L) 01/05/2021 04:33 AM    HCT 44.2 01/05/2021 04:33 AM    HGB 14.6 01/05/2021 04:33 AM    PLATELET 030 08/36/4426 04:33 AM       Imaging:    Results from Hospital Encounter encounter on 12/03/15   MRI BRAIN WO CONT    Narrative **Final Report**       ICD Codes / Adm. Diagnosis: 281.8  790.6 / Anemia associated with other s    Other abnormal blood   Examination:  MR BRAIN WO CON  - [id number] - Dec  5 2015  2:29PM  Accession No:  [id number]  Reason:  memory loss      REPORT:  INDICATION:  Anemia, memory loss, seizures, EtOH withdrawal.    EXAM: MRI BRAIN WITHOUT CONTRAST. COMPARISON: None  . CONTRAST: None administered. PULSE SEQUENCES: Sagittal T1-weighted images, axial T1-weighted images,   coronal T1-weighted images , axial FLAIR and T2 star weighted images, axial   diffusion weighted echo planar images, axial ADC mapping. FINDINGS: Image quality is significantly degraded by patient motion artifact   and the patient's inability to tolerate complete imaging sequences.    Generalized prominence of cerebral sulci and ventricles is increased,   slightly greater than expected for age. The brain parenchyma and ventricular system are otherwise unremarkable in   appearance for age. No parenchymal mass or hemorrhage and no shift of   midline structures. No extra-axial fluid or blood collection. No additional   signal abnormality. The craniocervical junction is normal for age, as are   other midline structures. Large  vessels appear patent on spin-echo imaging. There is no acute or subacute ischemia on diffusion weighting. Retention   cyst incidentally noted in the left maxillary sinus. .         IMPRESSION:  1. No acute or subacute ischemia or other acute intracranial abnormality   obvious. .  2. Mild microvascular ischemic and other age-related change. 3. Technically compromised examination. Signing/Reading Doctor: Lesvia Redmond ([id number])    Approved: Lesvia Redmond ([id number])  Dec  4 2015  9:59PM                                Results from Hospital Encounter encounter on 01/03/21   CT HEAD WO CONT    Narrative EXAM:  CT head without contrast    INDICATION: Seizure    COMPARISON: CT 8/29/2017    TECHNIQUE: Axial noncontrast head CT from foramen magnum to vertex. Coronal and  sagittal reformatted images were obtained. CT dose reduction was achieved  through use of a standardized protocol tailored for this examination and  automatic exposure control for dose modulation. FINDINGS:  There is diffuse age-related parenchymal volume loss. The ventricles  and sulci are age-appropriate without hydrocephalus. There is no mass effect or  midline shift. There is no intracranial hemorrhage or extra-axial fluid  collection. Scattered foci of low attenuation in the periventricular white  matter represent stable chronic microvascular ischemic changes. There is a  stable chronic left basal ganglia lacunar infarct. The basal cisterns are  patent. The osseous structures are intact.  The visualized paranasal sinuses and mastoid  air cells are clear. Impression IMPRESSION:   No acute intracranial abnormality. I did independently review the head CT from January 3, 2020. There was mild atrophy with periventricular microischemic changes. There was an old left basal ganglia infarct. Assessment and Plan:    The patient is a pleasant 49-year-old gentleman with a significant history of alcohol use and prior alcohol withdrawal seizures who was admitted after having had abruptly stopped alcohol and developing a generalized tonic-clonic seizure. He did return back to his baseline. His neurological examination is notable today for bilateral upper extremity essential tremor. 1. Seizure: The patient has had seizures before which by report have been related to alcohol withdrawal.  I would like to obtain an EEG today. If there is focality on the EEG, I would recommend starting him on Keppra. If the EEG is normal, I would hold off on starting medication as this is most likely a provoked seizure secondary to alcohol withdrawal.  I did explain this at length to the patient today. The patient should not drive for 6 months due to having a seizure. 2. Alcohol abuse:    I did discuss with him the impact alcohol can have not only on his brain but on other organ systems. He is very much interested in rehabilitation and would like phone numbers to contact. 3. Bilateral upper extremity essential tremor:    He states he has had this for many years. There are medications that could help him including propranolol and primidone. I did review this with him today. At this time, he is not interested in taking a medication for this, but will consider. This can be followed up through his primary care doctor or in the neurology clinic.          Active Problems:    ETOH abuse (1/4/2021)           I spent    70  minutes providing care to this  acutely ill inpatient with > 50% of the time counseling and assisting in the coordination of care of the patient on the patient's hospital floor/unit.          Signed By:  Leslie Vanessa DO FAAN    January 5, 2021

## 2021-01-05 NOTE — PROGRESS NOTES
Bedside and Verbal shift change report given to Dorothy Bustamante  (oncoming nurse) by Romeo Jones (offgoing nurse). Report included the following information SBAR, Kardex, Procedure Summary, Intake/Output, MAR and Recent Results. 0845: Patient sitting up in chair, CIWA at 4. Morning medications given. Patient asking about plans, will follow up with doctor    [phone]: Neurology consult made, Dr. Brittany Seo to see patient today    1210: Dr. Brittany Seo at bedside, EEG ordered    [id number]: EEG being done at bedside    1600: PO potassium given    1745: Discharge instructions reviewed with patient and patient's family, all questions answered.  IV removed and telemetry removed

## 2021-01-05 NOTE — PROGRESS NOTES
Problem: Falls - Risk of  Goal: *Absence of Falls  Description: Document Severo Sol Fall Risk and appropriate interventions in the flowsheet.   Outcome: Progressing Towards Goal  Note: Fall Risk Interventions:  Mobility Interventions: Communicate number of staff needed for ambulation/transfer         Medication Interventions: Patient to call before getting OOB

## 2021-01-05 NOTE — PROGRESS NOTES
Comprehensive Nutrition Assessment    Type and Reason for Visit: Initial, Positive nutrition screen    Nutrition Recommendations/Plan:   Continue current diet and supplement as ordered    Nutrition Assessment:      Patient medically noted for seizure and alcohol abuse. PMH HTN and CVA. Referral received for weight loss and poor appetite PTA. Patient reports a great appetite currently. Not eating very well PTA per patient; thinks he has lost ~13# over the past several months. Reports a UBW of ~185#. Differing weights from admission to current weight. He has ensure enlive ordered - he drinks this at home some times. Will continue for now. Encouraged intake of meals. Wt Readings from Last 10 Encounters:   01/04/21 87.4 kg (192 lb 10.9 oz)   09/25/20 92.5 kg (204 lb)   08/20/18 88 kg (194 lb 1 oz)   10/29/17 77.1 kg (170 lb)   08/29/17 77.2 kg (170 lb 3.1 oz)   07/15/17 83.1 kg (183 lb 3.2 oz)   02/01/17 78.8 kg (173 lb 11.6 oz)   01/24/17 79.2 kg (174 lb 9.7 oz)   11/03/16 73.9 kg (163 lb)   09/23/16 81.3 kg (179 lb 3.7 oz)       Estimated Daily Nutrient Needs:  Energy (kcal): 2118 kcal (BMR 1630 x 1. 3AF); Weight Used for Energy Requirements: Current  Protein (g): 104 (1.2 g/kg bw); Weight Used for Protein Requirements: Current  Fluid (ml/day): 2100 mL; Method Used for Fluid Requirements: 1 ml/kcal    Nutrition Related Findings:       Na 134, K+ 2.9, Phos 2.2  Folic Acid, MVI, Miralax, Pericolace, Thiamine   BM 1/3    Wounds:    None       Current Nutrition Therapies:  DIET REGULAR  DIET NUTRITIONAL SUPPLEMENTS All Meals; Ensure Enlive    Anthropometric Measures:  · Height:  5' 8\" (172.7 cm)  · Current Body Wt:  87.4 kg (192 lb 10.9 oz)   · Admission Body Wt:  182 lb(standing scale)    · Usual Body Wt:  83.9 kg (185 lb)     · Ideal Body Wt:  154 lbs:  125.1 %    · BMI Category: Overweight (BMI 25.0-29. 9)       Nutrition Diagnosis:   · No nutrition diagnosis at this time       Nutrition Interventions:   Food and/or Nutrient Delivery: Continue current diet, Continue oral nutrition supplement  Nutrition Education and Counseling: No recommendations at this time  Coordination of Nutrition Care: No recommendation at this time    Goals:  PO intake >70% of meals/supplement next 5-7 days       Nutrition Monitoring and Evaluation:   Behavioral-Environmental Outcomes: None identified  Food/Nutrient Intake Outcomes: Food and nutrient intake, Supplement intake  Physical Signs/Symptoms Outcomes: Biochemical data, Weight    Discharge Planning:    Continue current diet     Electronically signed by Alicja Fitzpatrick RD on 1/5/2021 at 12:23 PM    Contact: ext [phone]

## 2021-01-08 NOTE — DISCHARGE SUMMARY
Hospitalist Discharge Summary     Patient ID:  Kenn Sheets  [id number]  72 y.o.  1955  1/3/2021    PCP on record: Cindy Barcenas MD    Admit date: 1/3/2021  Discharge date and time: 1/8/2021    DISCHARGE DIAGNOSIS:    Alcohol withdrawal seizure    CONSULTATIONS:  IP CONSULT TO NEUROLOGY    Excerpted HPI from H&P of Denton Lewis MD:    72 y. o. male with past medical history as documented below presents to the ED with c/o of acute onset of witnessed grand mal seizure occurring about 1 hour ago. Jessie Brock reports a history of alcohol withdrawal seizures, last drink was yesterday.  Reports last admission for seizures approximately several months ago. Kenton Hector any previous history of seizures.     Pt denies any other alleviating or exacerbating factors. Additionally, pt specifically denies any recent fever, chills, headache, nausea, vomiting, abdominal pain, CP, SOB, lightheadedness, dizziness, numbness, weakness, lower extremity swelling, heart palpitations, urinary sxs, diarrhea, constipation, melena, hematochezia, cough, or congestion.      ______________________________________________________________________  DISCHARGE SUMMARY/HOSPITAL COURSE:  for full details see H&P, daily progress notes, labs, consult notes. The patient is 72years old man with past medical history significant for alcohol abuse admitted on January 3, 2021 after having witnessed generalized seizure. He was admitted initially to the ICU then transferred to stepdown unit. It was assumed that his generalized seizure was secondary to alcohol withdrawal.  Neurology evaluated the patient during his hospitalization EEG was performed with no acute changes. Discussed with neurology and they were okay with discharging the patient. Patient was advised not to drive for 6 months due to having seizure.   Counseling was appropriate for him for alcohol abuse.        _______________________________________________________________________  Patient seen and examined by me on discharge day. Pertinent Findings:  Gen:    Not in distress  Chest: Clear lungs  CVS:   Regular rhythm. No edema  Abd:  Soft, not distended, not tender  Neuro:  Alert,   _______________________________________________________________________  DISCHARGE MEDICATIONS:   Discharge Medication List as of 1/5/2021  5:29 PM      CONTINUE these medications which have NOT CHANGED    Details   amLODIPine (NORVASC) 10 mg tablet Take 10 mg by mouth daily. , Historical Med      naproxen sodium (ALEVE) 220 mg tablet Take 220 mg by mouth as needed., Historical Med      Omega-3 Fatty Acids (FISH OIL) 500 mg cap Take  by mouth daily. , Historical Med               Patient Follow Up Instructions: Activity: Activity as tolerated  Diet: Resume previous diet  Wound Care: None needed        Follow-up Information     Follow up With Specialties Details Why Contact Marquis Cuevas MD Internal Medicine Schedule an appointment as soon as possible for a visit in 1 week for PCP post hospital follow up appt. [address] [facility]   If you are in detox crisis you can go there for 3-5 day treatment through there ER.  [address]  [facility]    [address] In Assessment available by local CSB Team.  Phone: [phone]  Crisis Line: [address]  [facility] [address] [place] [address], [address] [place] [id number] [phone]  -Th 9 AM to 3 PM     Alcholoics Anonymous   Please reach out to find a local meeting in your area. Phone: [phone]  Website: [employer]    [facility] - for your family   Please reach out to find a meeting in your area for your family to find support.   Phone: [phone] ________________________________________________________________    Risk of deterioration: Low    Condition at Discharge:  Stable  __________________________________________________________________    Disposition  Home with family, no needs    ____________________________________________________________________    Code Status: Full Code  ___________________________________________________________________      Total time in minutes spent coordinating this discharge (includes going over instructions, follow-up, prescriptions, and preparing report for sign off to her PCP) :  >30 minutes    Signed:  Lore Ojeda MD

## 2022-03-19 PROBLEM — I10 HTN (HYPERTENSION): Status: ACTIVE | Noted: 2017-07-09

## 2022-03-19 PROBLEM — F10.931 DELIRIUM TREMENS (HCC): Status: ACTIVE | Noted: 2017-10-30

## 2022-03-19 PROBLEM — Z86.73 HISTORY OF CVA (CEREBROVASCULAR ACCIDENT): Status: ACTIVE | Noted: 2017-07-09

## 2022-03-19 PROBLEM — N17.9 ARF (ACUTE RENAL FAILURE) (HCC): Status: ACTIVE | Noted: 2017-07-09

## 2022-03-19 PROBLEM — R56.9 ALCOHOL WITHDRAWAL SEIZURE (HCC): Status: ACTIVE | Noted: 2017-08-29

## 2022-03-19 PROBLEM — F10.20 CHRONIC ALCOHOLISM (HCC): Status: ACTIVE | Noted: 2017-07-09

## 2022-03-19 PROBLEM — K76.0 HEPATIC STEATOSIS: Status: ACTIVE | Noted: 2017-07-09

## 2022-03-19 PROBLEM — I10 ACCELERATED HYPERTENSION: Status: ACTIVE | Noted: 2017-08-29

## 2022-03-19 PROBLEM — F10.939 ALCOHOL WITHDRAWAL SEIZURE (HCC): Status: ACTIVE | Noted: 2017-08-29

## 2022-03-20 PROBLEM — N39.0 UTI (URINARY TRACT INFECTION): Status: ACTIVE | Noted: 2017-07-09

## 2023-05-10 RX ORDER — NAPROXEN SODIUM 220 MG
TABLET ORAL PRN
COMMUNITY

## 2023-05-10 RX ORDER — AMLODIPINE BESYLATE 10 MG/1
10 TABLET ORAL DAILY
COMMUNITY

## 2024-04-04 NOTE — ED NOTES
Patient resting quietly in bed. Tremors barely visible. Respiratory rate and O2 sats stable at this time. BP decreasing. Yes 0

## (undated) DEVICE — SOLIDIFIER MEDC 1200ML -- CONVERT TO 356117

## (undated) DEVICE — BASIN EMSIS 16OZ GRAPHITE PLAS KID SHP MOLD GRAD FOR ORAL

## (undated) DEVICE — 1200 GUARD II KIT W/5MM TUBE W/O VAC TUBE: Brand: GUARDIAN

## (undated) DEVICE — NEEDLE HYPO 18GA L1.5IN PNK S STL HUB POLYPR SHLD REG BVL

## (undated) DEVICE — KENDALL RADIOLUCENT FOAM MONITORING ELECTRODE RECTANGULAR SHAPE: Brand: KENDALL

## (undated) DEVICE — SYR 3ML LL TIP 1/10ML GRAD --

## (undated) DEVICE — CUFF ADULT 1 PC 1 VINYL DISP --

## (undated) DEVICE — CATH IV AUTOGRD BC PNK 20GA 25 -- INSYTE

## (undated) DEVICE — NEONATAL-ADULT SPO2 SENSOR: Brand: NELLCOR

## (undated) DEVICE — SYR 10ML LUER LOK 1/5ML GRAD --

## (undated) DEVICE — SET ADMIN 16ML TBNG L100IN 2 Y INJ SITE IV PIGGY BK DISP

## (undated) DEVICE — Z DISCONTINUED PER MEDLINE LINE GAS SAMPLING O2/CO2 LNG AD 13 FT NSL W/ TBNG FILTERLINE

## (undated) DEVICE — Device

## (undated) DEVICE — TOWEL 4 PLY TISS 19X30 SUE WHT